# Patient Record
Sex: FEMALE | Race: WHITE | NOT HISPANIC OR LATINO | Employment: OTHER | URBAN - METROPOLITAN AREA
[De-identification: names, ages, dates, MRNs, and addresses within clinical notes are randomized per-mention and may not be internally consistent; named-entity substitution may affect disease eponyms.]

---

## 2017-05-16 ENCOUNTER — ALLSCRIPTS OFFICE VISIT (OUTPATIENT)
Dept: OTHER | Facility: OTHER | Age: 72
End: 2017-05-16

## 2017-05-16 ENCOUNTER — LAB REQUISITION (OUTPATIENT)
Dept: LAB | Facility: HOSPITAL | Age: 72
End: 2017-05-16
Payer: MEDICARE

## 2017-05-16 DIAGNOSIS — M81.0 AGE-RELATED OSTEOPOROSIS WITHOUT CURRENT PATHOLOGICAL FRACTURE: ICD-10-CM

## 2017-05-16 DIAGNOSIS — R30.9 PAINFUL MICTURITION: ICD-10-CM

## 2017-05-16 LAB
BILIRUB UR QL STRIP: NORMAL
CLARITY UR: NORMAL
COLOR UR: YELLOW
GLUCOSE (HISTORICAL): NORMAL
HGB UR QL STRIP.AUTO: NORMAL
KETONES UR STRIP-MCNC: NORMAL MG/DL
LEUKOCYTE ESTERASE UR QL STRIP: NORMAL
NITRITE UR QL STRIP: NORMAL
PH UR STRIP.AUTO: 6.5 [PH]
PROT UR STRIP-MCNC: 30 MG/DL
SP GR UR STRIP.AUTO: 1.02
UROBILINOGEN UR QL STRIP.AUTO: 0.2

## 2017-05-16 PROCEDURE — 87086 URINE CULTURE/COLONY COUNT: CPT | Performed by: PHYSICIAN ASSISTANT

## 2017-05-16 PROCEDURE — 87186 SC STD MICRODIL/AGAR DIL: CPT | Performed by: PHYSICIAN ASSISTANT

## 2017-05-16 PROCEDURE — 87077 CULTURE AEROBIC IDENTIFY: CPT | Performed by: PHYSICIAN ASSISTANT

## 2017-05-19 LAB — BACTERIA UR CULT: NORMAL

## 2017-06-28 ENCOUNTER — GENERIC CONVERSION - ENCOUNTER (OUTPATIENT)
Dept: OTHER | Facility: OTHER | Age: 72
End: 2017-06-28

## 2017-08-28 ENCOUNTER — TRANSCRIBE ORDERS (OUTPATIENT)
Dept: LAB | Facility: CLINIC | Age: 72
End: 2017-08-28

## 2017-08-28 ENCOUNTER — APPOINTMENT (OUTPATIENT)
Dept: LAB | Facility: CLINIC | Age: 72
End: 2017-08-28
Payer: MEDICARE

## 2017-08-28 DIAGNOSIS — I10 ESSENTIAL HYPERTENSION, BENIGN: ICD-10-CM

## 2017-08-28 DIAGNOSIS — M81.0 SENILE OSTEOPOROSIS: ICD-10-CM

## 2017-08-28 DIAGNOSIS — K58.9 IRRITABLE BOWEL SYNDROME, UNSPECIFIED TYPE: ICD-10-CM

## 2017-08-28 DIAGNOSIS — K44.9 HERNIA, HIATAL: ICD-10-CM

## 2017-08-28 DIAGNOSIS — F41.9 ANXIETY HYPERVENTILATION: ICD-10-CM

## 2017-08-28 DIAGNOSIS — F41.9 ANXIETY HYPERVENTILATION: Primary | ICD-10-CM

## 2017-08-28 DIAGNOSIS — F45.8 ANXIETY HYPERVENTILATION: Primary | ICD-10-CM

## 2017-08-28 DIAGNOSIS — F45.8 ANXIETY HYPERVENTILATION: ICD-10-CM

## 2017-08-28 LAB
ALBUMIN SERPL BCP-MCNC: 3.6 G/DL (ref 3.5–5)
ALP SERPL-CCNC: 79 U/L (ref 46–116)
ALT SERPL W P-5'-P-CCNC: 34 U/L (ref 12–78)
ANION GAP SERPL CALCULATED.3IONS-SCNC: 7 MMOL/L (ref 4–13)
AST SERPL W P-5'-P-CCNC: 21 U/L (ref 5–45)
BILIRUB SERPL-MCNC: 0.37 MG/DL (ref 0.2–1)
BUN SERPL-MCNC: 21 MG/DL (ref 5–25)
CALCIUM SERPL-MCNC: 9.1 MG/DL (ref 8.3–10.1)
CHLORIDE SERPL-SCNC: 105 MMOL/L (ref 100–108)
CHOLEST SERPL-MCNC: 199 MG/DL (ref 50–200)
CO2 SERPL-SCNC: 27 MMOL/L (ref 21–32)
CREAT SERPL-MCNC: 0.75 MG/DL (ref 0.6–1.3)
ERYTHROCYTE [DISTWIDTH] IN BLOOD BY AUTOMATED COUNT: 13.9 % (ref 11.6–15.1)
GFR SERPL CREATININE-BSD FRML MDRD: 81 ML/MIN/1.73SQ M
GLUCOSE P FAST SERPL-MCNC: 98 MG/DL (ref 65–99)
HCT VFR BLD AUTO: 41.8 % (ref 34.8–46.1)
HDLC SERPL-MCNC: 65 MG/DL (ref 40–60)
HGB BLD-MCNC: 14.2 G/DL (ref 11.5–15.4)
LDLC SERPL CALC-MCNC: 118 MG/DL (ref 0–100)
MCH RBC QN AUTO: 30 PG (ref 26.8–34.3)
MCHC RBC AUTO-ENTMCNC: 34 G/DL (ref 31.4–37.4)
MCV RBC AUTO: 88 FL (ref 82–98)
PLATELET # BLD AUTO: 278 THOUSANDS/UL (ref 149–390)
PMV BLD AUTO: 9.3 FL (ref 8.9–12.7)
POTASSIUM SERPL-SCNC: 4.5 MMOL/L (ref 3.5–5.3)
PROT SERPL-MCNC: 7.6 G/DL (ref 6.4–8.2)
RBC # BLD AUTO: 4.73 MILLION/UL (ref 3.81–5.12)
SODIUM SERPL-SCNC: 139 MMOL/L (ref 136–145)
TRIGL SERPL-MCNC: 81 MG/DL
TSH SERPL DL<=0.05 MIU/L-ACNC: 2.02 UIU/ML (ref 0.36–3.74)
WBC # BLD AUTO: 6.03 THOUSAND/UL (ref 4.31–10.16)

## 2017-08-28 PROCEDURE — 85027 COMPLETE CBC AUTOMATED: CPT

## 2017-08-28 PROCEDURE — 80061 LIPID PANEL: CPT

## 2017-08-28 PROCEDURE — 36415 COLL VENOUS BLD VENIPUNCTURE: CPT

## 2017-08-28 PROCEDURE — 80053 COMPREHEN METABOLIC PANEL: CPT

## 2017-08-28 PROCEDURE — 84443 ASSAY THYROID STIM HORMONE: CPT

## 2017-09-05 ENCOUNTER — ALLSCRIPTS OFFICE VISIT (OUTPATIENT)
Dept: OTHER | Facility: OTHER | Age: 72
End: 2017-09-05

## 2017-09-05 DIAGNOSIS — M81.0 AGE-RELATED OSTEOPOROSIS WITHOUT CURRENT PATHOLOGICAL FRACTURE: ICD-10-CM

## 2017-10-25 NOTE — PROGRESS NOTES
Assessment  1  Hypertension (401 9) (I10)   2  Osteoporosis (733 00) (M81 0)   3  Anxiety disorder (300 00) (F41 9)   4  GERD without esophagitis (530 81) (K21 9)   5  Family history of osteoporosis (V17 81) (Z82 62) : Sister    Plan  Anxiety disorder, GERD without esophagitis, Hypertension, Osteoporosis    · Follow-up visit in 1 year Evaluation and Treatment  Follow-up  Status: Hold For -  Scheduling  Requested for: 05Sep2017   · (1) CBC/ PLT (NO DIFF); Status:Active; Requested for:17Coi2061;    · (1) COMPREHENSIVE METABOLIC PANEL; Status:Active; Requested for:83Okt9615;    · (1) LIPID PANEL, FASTING; Status:Active; Requested for:05Sep2018;    · (1) TSH; Status:Active; Requested for:05Sep2018;    · (1) VITAMIN D 25-HYDROXY; Status:Active; Requested KCY:[id number]; Health Maintenance    · *VB - Urinary Incontinence Screen (Dx Z13 89 Screen for UI); Status:Complete;   Done:  [id number] 12:11PM  Hypertension    · AmLODIPine Besylate 2 5 MG Oral Tablet; TAKE 1 TABLET DAILY  Malignant melanoma of skin    · ALPRAZolam 0 25 MG Oral Tablet (Xanax); TAKE 1 TABLET DAILY AS NEEDED  Osteoporosis    · * DXA BONE DENSITY SPINE HIP AND PELVIS; Status:Hold For - Scheduling; Requested  HAB:[id number];     Discussion/Summary    #1  Hypertension?stable continue current medication CMP normal  Recheck one year  Osteoporosis? DEXA overdue last 2014  Order given  (Pt  will get DEXA but will never go on meds as both of her two sisters were on meds and had problems with bone wasting needing surgery  )Anxiety?stable  Refills given  Hiatal hernia with GERD? stable  Chief Complaint  Follow up to chronic conditions and review blood work results  Refill of Xanax  History of Present Illness  patient here today to follow-up on chronic conditions including hypertension, osteoporosis anxiety and hiatal hernia with GERD  Blood pressure very control today at 132/68   Blood work shows normal TSH, CBC, CMP and a lipid panel showing an LDL of 118 triglycerides 81  Last DEXA 2014 patient is overdue  She is taking all medications as directed without complaints of side effects  Review of Systems    Constitutional: No fever, no chills, feels well, no tiredness, no recent weight gain or weight loss  Eyes: No complaints of eye pain, no red eyes, no eyesight problems, no discharge, no dry eyes, no itching of eyes  ENT: no complaints of earache, no loss of hearing, no nose bleeds, no nasal discharge, no sore throat, no hoarseness  Cardiovascular: No complaints of slow heart rate, no fast heart rate, no chest pain, no palpitations, no leg claudication, no lower extremity edema  Respiratory: No complaints of shortness of breath, no wheezing, no cough, no SOB on exertion, no orthopnea, no PND  Gastrointestinal: No complaints of abdominal pain, no constipation, no nausea or vomiting, no diarrhea, no bloody stools  Genitourinary: No complaints of dysuria, no incontinence, no pelvic pain, no dysmenorrhea, no vaginal discharge or bleeding  Musculoskeletal: No complaints of arthralgias, no myalgias, no joint swelling or stiffness, no limb pain or swelling  Integumentary: No complaints of skin rash or lesions, no itching, no skin wounds, no breast pain or lump  Neurological: No complaints of headache, no confusion, no convulsions, no numbness, no dizziness or fainting, no tingling, no limb weakness, no difficulty walking  Psychiatric: Not suicidal, no sleep disturbance, no anxiety or depression, no change in personality, no emotional problems  Endocrine: No complaints of proptosis, no hot flashes, no muscle weakness, no deepening of the voice, no feelings of weakness  Hematologic/Lymphatic: No complaints of swollen glands, no swollen glands in the neck, does not bleed easily, does not bruise easily  Preventive Quality 65 and Older: The patient currently has no urinary incontinence symptoms  ROS reviewed  Active Problems  1   Acute gastritis (535 00) (K29 00)   2  Anxiety disorder (300 00) (F41 9)   3  Arthralgia Of Left Shoulder Region (719 41)   4  Asymptomatic postmenopausal status (age-related) (natural) (V49 81) (Z78 0)   5  Cardiac arrhythmia (427 9) (I49 9)   6  Diverticulosis (562 10) (K57 90)   7  GERD without esophagitis (530 81) (K21 9)   8  Hiatal hernia (553 3) (K44 9)   9  Hypercholesterolemia (272 0) (E78 00)   10  Hypertension (401 9) (I10)   11  Irritable bowel syndrome (564 1) (K58 9)   12  Malignant melanoma of skin (172 9) (C43 9)   13  Need for prophylactic vaccination and inoculation against influenza (V04 81) (Z23)   14  Osteoporosis (733 00) (M81 0)   15  Pain in joint of left shoulder (719 41) (M25 512)   16  Painful urination (788 1) (R30 9)   17  Pneumonia (486) (J18 9)   18  Right Deltoid Muscle Strain (840 8)   19  Rotator cuff tendinitis, unspecified laterality (726 10) (M75 80)   20  Rotator Cuff Tendon Tear (726 19)   21  Screening for other and unspecified genitourinary condition (V81 6) (Z13 89)   22  Special screening for other neurological conditions (V80 09) (Z13 89)   23  Splenic artery aneurysm (442 83) (I72 8)   24  Squamous cell carcinoma of skin (173 92) (C44 92)   25  Urinary tract infection (599 0) (N39 0)    Past Medical History  1  History of Encounter for screening colonoscopy (V76 51) (Z12 11)    The active problems and past medical history were reviewed and updated today  Surgical History    The surgical history was reviewed and updated today  Family History  Mother    1  Family history of Cervical Cancer  Father    2  Family history of Prostate Cancer (V16 42)  Sister    3  Family history of osteoporosis (V17 81) (Z82 62)  Family History    4  Family history of Death In The Family Father    The family history was reviewed and updated today         Social History   · Marital History - Currently    · Never a smoker   · Never Drank Alcohol   · No secondhand smoke exposure (V49 89) (Z78 9)   · Retired  The social history was reviewed and updated today  Current Meds   1  ALPRAZolam 0 25 MG Oral Tablet; TAKE 1 TABLET DAILY AS NEEDED; Therapy: 40DGC5761 to (Evaluate:40Exf3546); Last Rx:89Mwd0762 Ordered   2  AmLODIPine Besylate 2 5 MG Oral Tablet; TAKE 1 TABLET DAILY; Therapy: 03EDX6512 to (78 [phone])  Requested for: 06DWG0359; Last   Rx:10Btn3246 Ordered   3  Omeprazole 20 MG Oral Capsule Delayed Release; Therapy: (475 9296) to Recorded   4  Vitamin D 1000 UNIT CAPS; TAKE 2 CAPSULE Daily; Therapy: 80FRG7519 to (Last Rx:62Lux8224) Ordered    The medication list was reviewed and updated today  Allergies  1  Penicillins    Vitals  Vital Signs    Recorded: 05Sep2017 12:05PM   Heart Rate 68   Systolic 080, LUE, Sitting   Diastolic 68, LUE, Sitting   Height 5 ft 4 in   Weight 133 lb 8 oz   BMI Calculated 22 92   BSA Calculated 1 65     Physical Exam    Constitutional   General appearance: No acute distress, well appearing and well nourished  Eyes   Conjunctiva and lids: No swelling, erythema or discharge  Ears, Nose, Mouth, and Throat   External inspection of ears and nose: Normal     Pulmonary   Respiratory effort: No increased work of breathing or signs of respiratory distress  Auscultation of lungs: Clear to auscultation  Cardiovascular   Auscultation of heart: Normal rate and rhythm, normal S1 and S2, without murmurs      Examination of extremities for edema and/or varicosities: Normal     Carotid pulses: Normal     Musculoskeletal   Gait and station: Normal     Psychiatric   Mood and affect: Normal          Results/Data  *VB - Urinary Incontinence Screen (Dx Z13 89 Screen for UI) 29RDA2702 12:11PM Geovanni Mckeon     Test Name Result Flag Reference   Urinary Incontinence Assessment [id number]       Falls Risk Assessment (Dx Z13 89 Screen for Neurologic Disorder) 35GUS8490 12:10PM Abimael s     Test Name Result Flag Reference   Falls Risk      No falls in the past year     PHQ-2 Adult Depression Screening 19Akw3237 12:10PM User, Ahs     Test Name Result Flag Reference   PHQ-2 Adult Depression Score 0     Over the last two weeks, how often have you been bothered by any of the following problems?   Little interest or pleasure in doing things: Not at all - 0  Feeling down, depressed, or hopeless: Not at all - 0   PHQ-2 Adult Depression Screening Negative         Signatures   Electronically signed by : [facility]; Sep  5 2017 12:39PM EST                       (Author)    Electronically signed by : Mingo Gunderson MD; Sep  5 2017 12:43PM EST                       (Author)

## 2018-01-13 VITALS
SYSTOLIC BLOOD PRESSURE: 132 MMHG | HEIGHT: 64 IN | DIASTOLIC BLOOD PRESSURE: 68 MMHG | BODY MASS INDEX: 22.79 KG/M2 | HEART RATE: 68 BPM | WEIGHT: 133.5 LBS

## 2018-01-14 VITALS
BODY MASS INDEX: 22.56 KG/M2 | DIASTOLIC BLOOD PRESSURE: 62 MMHG | HEART RATE: 64 BPM | WEIGHT: 132.13 LBS | SYSTOLIC BLOOD PRESSURE: 134 MMHG | HEIGHT: 64 IN

## 2018-03-29 ENCOUNTER — TELEPHONE (OUTPATIENT)
Dept: FAMILY MEDICINE CLINIC | Facility: CLINIC | Age: 73
End: 2018-03-29

## 2018-03-29 DIAGNOSIS — I10 HYPERTENSION, UNSPECIFIED TYPE: Primary | ICD-10-CM

## 2018-03-29 NOTE — TELEPHONE ENCOUNTER
PATIENT IS ASKING WHY HER AMLODIPINE DOES NOT HAVE ANY REFILLS ON IT - NORMALLY [employer] GIVES HER A 90 DAY SUPPLY WITH 3 REFILLS AND THIS TIME SHE WAS GIVEN ONLY ONE   CAN WE PLEASE REFILL THIS TO [facility] [address] PLEASE  SHE IS NORMALLY SEEN ONCE A YEAR  PLEASE CALL PT ASAP AS SHE ONLY HAS 3 PILLS LEFT  THANK YOU

## 2018-03-30 RX ORDER — AMLODIPINE BESYLATE 2.5 MG/1
1 TABLET ORAL DAILY
COMMUNITY
Start: 2012-11-05 | End: 2018-03-30 | Stop reason: SDUPTHER

## 2018-03-30 RX ORDER — AMLODIPINE BESYLATE 2.5 MG/1
2.5 TABLET ORAL DAILY
Qty: 90 TABLET | Refills: 3 | Status: SHIPPED | OUTPATIENT
Start: 2018-03-30 | End: 2018-09-27 | Stop reason: SDUPTHER

## 2018-09-05 DIAGNOSIS — F41.9 ANXIETY DISORDER: ICD-10-CM

## 2018-09-05 DIAGNOSIS — I10 ESSENTIAL (PRIMARY) HYPERTENSION: ICD-10-CM

## 2018-09-05 DIAGNOSIS — M81.0 AGE-RELATED OSTEOPOROSIS WITHOUT CURRENT PATHOLOGICAL FRACTURE: ICD-10-CM

## 2018-09-05 DIAGNOSIS — K21.9 GASTRO-ESOPHAGEAL REFLUX DISEASE WITHOUT ESOPHAGITIS: ICD-10-CM

## 2018-09-19 ENCOUNTER — APPOINTMENT (OUTPATIENT)
Dept: LAB | Facility: CLINIC | Age: 73
End: 2018-09-19
Payer: MEDICARE

## 2018-09-19 DIAGNOSIS — K21.9 GASTRO-ESOPHAGEAL REFLUX DISEASE WITHOUT ESOPHAGITIS: ICD-10-CM

## 2018-09-19 DIAGNOSIS — M81.0 AGE-RELATED OSTEOPOROSIS WITHOUT CURRENT PATHOLOGICAL FRACTURE: ICD-10-CM

## 2018-09-19 DIAGNOSIS — I10 ESSENTIAL (PRIMARY) HYPERTENSION: ICD-10-CM

## 2018-09-19 DIAGNOSIS — F41.9 ANXIETY DISORDER: ICD-10-CM

## 2018-09-19 LAB
25(OH)D3 SERPL-MCNC: 55.6 NG/ML (ref 30–100)
ALBUMIN SERPL BCP-MCNC: 3.6 G/DL (ref 3.5–5)
ALP SERPL-CCNC: 77 U/L (ref 46–116)
ALT SERPL W P-5'-P-CCNC: 29 U/L (ref 12–78)
ANION GAP SERPL CALCULATED.3IONS-SCNC: 4 MMOL/L (ref 4–13)
AST SERPL W P-5'-P-CCNC: 15 U/L (ref 5–45)
BILIRUB SERPL-MCNC: 0.53 MG/DL (ref 0.2–1)
BUN SERPL-MCNC: 23 MG/DL (ref 5–25)
CALCIUM SERPL-MCNC: 9.2 MG/DL (ref 8.3–10.1)
CHLORIDE SERPL-SCNC: 103 MMOL/L (ref 100–108)
CHOLEST SERPL-MCNC: 181 MG/DL (ref 50–200)
CO2 SERPL-SCNC: 30 MMOL/L (ref 21–32)
CREAT SERPL-MCNC: 0.69 MG/DL (ref 0.6–1.3)
ERYTHROCYTE [DISTWIDTH] IN BLOOD BY AUTOMATED COUNT: 13.4 % (ref 11.6–15.1)
GFR SERPL CREATININE-BSD FRML MDRD: 88 ML/MIN/1.73SQ M
GLUCOSE P FAST SERPL-MCNC: 85 MG/DL (ref 65–99)
HCT VFR BLD AUTO: 42.8 % (ref 34.8–46.1)
HDLC SERPL-MCNC: 57 MG/DL (ref 40–60)
HGB BLD-MCNC: 13.9 G/DL (ref 11.5–15.4)
LDLC SERPL CALC-MCNC: 111 MG/DL (ref 0–100)
MCH RBC QN AUTO: 30.1 PG (ref 26.8–34.3)
MCHC RBC AUTO-ENTMCNC: 32.5 G/DL (ref 31.4–37.4)
MCV RBC AUTO: 93 FL (ref 82–98)
NONHDLC SERPL-MCNC: 124 MG/DL
PLATELET # BLD AUTO: 281 THOUSANDS/UL (ref 149–390)
PMV BLD AUTO: 9.2 FL (ref 8.9–12.7)
POTASSIUM SERPL-SCNC: 4 MMOL/L (ref 3.5–5.3)
PROT SERPL-MCNC: 7.4 G/DL (ref 6.4–8.2)
RBC # BLD AUTO: 4.62 MILLION/UL (ref 3.81–5.12)
SODIUM SERPL-SCNC: 137 MMOL/L (ref 136–145)
TRIGL SERPL-MCNC: 64 MG/DL
TSH SERPL DL<=0.05 MIU/L-ACNC: 3.1 UIU/ML (ref 0.36–3.74)
WBC # BLD AUTO: 6.26 THOUSAND/UL (ref 4.31–10.16)

## 2018-09-19 PROCEDURE — 80061 LIPID PANEL: CPT

## 2018-09-19 PROCEDURE — 80053 COMPREHEN METABOLIC PANEL: CPT

## 2018-09-19 PROCEDURE — 85027 COMPLETE CBC AUTOMATED: CPT

## 2018-09-19 PROCEDURE — 84443 ASSAY THYROID STIM HORMONE: CPT

## 2018-09-19 PROCEDURE — 82306 VITAMIN D 25 HYDROXY: CPT

## 2018-09-19 PROCEDURE — 36415 COLL VENOUS BLD VENIPUNCTURE: CPT

## 2018-09-26 NOTE — ASSESSMENT & PLAN NOTE
Patient is overdue for DEXA, however she does not want to go for it as she is not going to take the medication if needed anyway  Vit D level good at 55

## 2018-09-27 ENCOUNTER — OFFICE VISIT (OUTPATIENT)
Dept: FAMILY MEDICINE CLINIC | Facility: CLINIC | Age: 73
End: 2018-09-27
Payer: MEDICARE

## 2018-09-27 VITALS
DIASTOLIC BLOOD PRESSURE: 72 MMHG | BODY MASS INDEX: 22.8 KG/M2 | HEART RATE: 68 BPM | SYSTOLIC BLOOD PRESSURE: 142 MMHG | WEIGHT: 132.8 LBS

## 2018-09-27 DIAGNOSIS — I10 HYPERTENSION, UNSPECIFIED TYPE: ICD-10-CM

## 2018-09-27 DIAGNOSIS — M81.0 AGE-RELATED OSTEOPOROSIS WITHOUT CURRENT PATHOLOGICAL FRACTURE: ICD-10-CM

## 2018-09-27 DIAGNOSIS — Z23 NEED FOR INFLUENZA VACCINATION: ICD-10-CM

## 2018-09-27 DIAGNOSIS — K21.9 GERD WITHOUT ESOPHAGITIS: ICD-10-CM

## 2018-09-27 DIAGNOSIS — E78.00 HYPERCHOLESTEROLEMIA: Primary | ICD-10-CM

## 2018-09-27 DIAGNOSIS — I10 ESSENTIAL HYPERTENSION: ICD-10-CM

## 2018-09-27 DIAGNOSIS — Z23 NEED FOR PNEUMOCOCCAL VACCINATION: ICD-10-CM

## 2018-09-27 DIAGNOSIS — F41.1 GENERALIZED ANXIETY DISORDER: ICD-10-CM

## 2018-09-27 PROCEDURE — 99214 OFFICE O/P EST MOD 30 MIN: CPT | Performed by: PHYSICIAN ASSISTANT

## 2018-09-27 PROCEDURE — 90670 PCV13 VACCINE IM: CPT

## 2018-09-27 PROCEDURE — G0008 ADMIN INFLUENZA VIRUS VAC: HCPCS

## 2018-09-27 PROCEDURE — G0009 ADMIN PNEUMOCOCCAL VACCINE: HCPCS

## 2018-09-27 PROCEDURE — 90662 IIV NO PRSV INCREASED AG IM: CPT

## 2018-09-27 RX ORDER — AMLODIPINE BESYLATE 2.5 MG/1
2.5 TABLET ORAL DAILY
Qty: 90 TABLET | Refills: 3 | Status: SHIPPED | OUTPATIENT
Start: 2018-09-27 | End: 2019-07-22 | Stop reason: SDUPTHER

## 2018-09-27 RX ORDER — OMEPRAZOLE 20 MG/1
CAPSULE, DELAYED RELEASE ORAL
COMMUNITY
End: 2021-08-03 | Stop reason: ALTCHOICE

## 2018-09-27 RX ORDER — ALPRAZOLAM 0.25 MG/1
1 TABLET ORAL DAILY PRN
COMMUNITY
Start: 2012-11-05 | End: 2022-03-25 | Stop reason: SDUPTHER

## 2018-09-27 RX ORDER — BIOTIN 1 MG
2 TABLET ORAL DAILY
COMMUNITY
Start: 2014-03-12

## 2018-09-27 NOTE — PROGRESS NOTES
Assessment/Plan:    Age-related osteoporosis without current pathological fracture  Patient is overdue for DEXA, however she does not want to go for it as she is not going to take the medication if needed anyway  Vit D level good at 55  Hypercholesterolemia  Stable without medication use with an LDL of 111 triglycerides 64  Essential hypertension  Stable, continue current medications  Diagnoses and all orders for this visit:    Hypercholesterolemia  -     Lipid Panel with Direct LDL reflex; Future  -     Comprehensive metabolic panel; Future  -     CBC and differential; Future    Generalized anxiety disorder    Age-related osteoporosis without current pathological fracture  -     DXA bone density spine hip and pelvis; Future  -     Vitamin D 25 hydroxy; Future    Essential hypertension    GERD without esophagitis    Hypertension, unspecified type  -     amLODIPine (NORVASC) 2 5 mg tablet; Take 1 tablet (2 5 mg total) by mouth daily  -     Comprehensive metabolic panel; Future    Need for influenza vaccination  -     influenza vaccine, 2459-7062, high-dose, PF 0 5 mL, for patients 65 yr+ (FLUZONE HIGH-DOSE)    Other orders  -     Cholecalciferol (VITAMIN D3) 1000 units CAPS; Take 2 capsules by mouth daily  -     omeprazole (PriLOSEC) 20 mg delayed release capsule; Take by mouth  -     ALPRAZolam (XANAX) 0 25 mg tablet; Take 1 tablet by mouth daily as needed          Subjective:   CC: Follow up for chronic conditions and to review blood work  osmel   Patient ID: Jorje Lora is a 72 y o  female  Jorje Lora is here for chronic conditions f/u including the diagnosis of Hypercholesterolemia  (primary encounter diagnosis)  Generalized anxiety disorder  Age-related osteoporosis without current pathological fracture  Essential hypertension  Gerd without esophagitis  Hypertension, unspecified type  Need for influenza vaccination    Pt  states they are taking all medications as directed without complaints or side effects   Pt  had labs done prior to today's visit which included Recent Results (from the past 672 hour(s))  -Vitamin D 25 hydroxy  Collection Time: 09/19/18  7:07 AM       Result                      Value             Ref Range           Vit D, 25-Hydroxy           55 6              30 0 - 100 0*  -Comprehensive metabolic panel  Collection Time: 09/19/18  7:07 AM       Result                      Value             Ref Range           Sodium                      137               136 - 145 mm*       Potassium                   4 0               3 5 - 5 3 mm*       Chloride                    103               100 - 108 mm*       CO2                         30                21 - 32 mmol*       ANION GAP                   4                 4 - 13 mmol/L       BUN                         23                5 - 25 mg/dL        Creatinine                  0 69              0 60 - 1 30 *       Glucose, Fasting            85                65 - 99 mg/dL       Calcium                     9 2               8 3 - 10 1 m*       AST                         15                5 - 45 U/L          ALT                         29                12 - 78 U/L         Alkaline Phosphatase        77                46 - 116 U/L        Total Protein               7 4               6 4 - 8 2 g/*       Albumin                     3 6               3 5 - 5 0 g/*       Total Bilirubin             0 53              0 20 - 1 00 *       eGFR                        88                ml/min/1 73s*  -Lipid panel  Collection Time: 09/19/18  7:07 AM       Result                      Value             Ref Range           Cholesterol                 181               50 - 200 mg/*       Triglycerides               64                <=150 mg/dL         HDL, Direct                 57                40 - 60 mg/dL       LDL Calculated              111 (H)           0 - 100 mg/dL       Non-HDL-Chol (CHOL-HDL)     124               mg/dl -TSH, 3rd generation  Collection Time: 09/19/18  7:07 AM       Result                      Value             Ref Range           TSH 3RD GENERATON           3 100             0 358 - 3 74*  -CBC  Collection Time: 09/19/18  7:07 AM       Result                      Value             Ref Range           WBC                         6 26              4 31 - 10 16*       RBC                         4 62              3 81 - 5 12 *       Hemoglobin                  13 9              11 5 - 15 4 *       Hematocrit                  42 8              34 8 - 46 1 %       MCV                         93                82 - 98 fL          MCH                         30 1              26 8 - 34 3 *       MCHC                        32 5              31 4 - 37 4 *       RDW                         13 4              11 6 - 15 1 %       Platelets                   281               149 - 390 Th*       MPV                         9 2               8 9 - 12 7 fL    Pt has been going to the gym once weekly  Yesterday BP at home was 114/62 in the am  It is always high in the office  The following portions of the patient's history were reviewed and updated as appropriate: allergies, current medications, past family history, past medical history, past social history, past surgical history and problem list     Review of Systems   Constitutional: Negative  HENT: Negative  Eyes: Negative  Respiratory: Negative  Cardiovascular: Negative  Gastrointestinal: Negative  Endocrine: Negative  Genitourinary: Negative  Musculoskeletal: Negative  Skin: Negative  Allergic/Immunologic: Negative  Neurological: Negative  Hematological: Negative  Psychiatric/Behavioral: Negative            Objective:      Vitals:    09/27/18 0900   BP: 142/72   BP Location: Left arm   Patient Position: Sitting   Pulse: 68   Weight: 60 2 kg (132 lb 12 8 oz)            Physical Exam   Constitutional: She is oriented to person, place, and time  She appears well-developed and well-nourished  No distress  HENT:   Head: Normocephalic and atraumatic  Eyes: Conjunctivae are normal  Right eye exhibits no discharge  Left eye exhibits no discharge  Neck: Neck supple  Carotid bruit is not present  Cardiovascular: Normal rate, regular rhythm and normal heart sounds  Exam reveals no gallop and no friction rub  No murmur heard  Pulmonary/Chest: Effort normal and breath sounds normal  No respiratory distress  She has no wheezes  She has no rales  Neurological: She is alert and oriented to person, place, and time  Skin: Skin is warm and dry  She is not diaphoretic  Psychiatric: She has a normal mood and affect  Judgment normal    Nursing note and vitals reviewed

## 2018-09-27 NOTE — PATIENT INSTRUCTIONS
Problem List Items Addressed This Visit        Digestive    GERD without esophagitis    Relevant Medications    omeprazole (PriLOSEC) 20 mg delayed release capsule       Cardiovascular and Mediastinum    Essential hypertension     Stable, continue current medications  Relevant Medications    amLODIPine (NORVASC) 2 5 mg tablet       Musculoskeletal and Integument    Age-related osteoporosis without current pathological fracture     Patient is overdue for DEXA, however she does not want to go for it as she is not going to take the medication if needed anyway  Vit D level good at 55  Relevant Orders    DXA bone density spine hip and pelvis    Vitamin D 25 hydroxy       Other    Generalized anxiety disorder    Relevant Medications    ALPRAZolam (XANAX) 0 25 mg tablet    Hypercholesterolemia - Primary     Stable without medication use with an LDL of 111 triglycerides 64           Relevant Orders    Lipid Panel with Direct LDL reflex    Comprehensive metabolic panel    CBC and differential      Other Visit Diagnoses     Hypertension, unspecified type        Relevant Medications    amLODIPine (NORVASC) 2 5 mg tablet    Other Relevant Orders    Comprehensive metabolic panel    Need for influenza vaccination        Relevant Orders    influenza vaccine, [id number], high-dose, PF 0 5 mL, for patients 65 yr+ (FLUZONE HIGH-DOSE)

## 2018-12-03 ENCOUNTER — TELEPHONE (OUTPATIENT)
Dept: FAMILY MEDICINE CLINIC | Facility: CLINIC | Age: 73
End: 2018-12-03

## 2018-12-03 NOTE — TELEPHONE ENCOUNTER
I do not believe the recall is for norvasc alone but for the meds which are combo with norvasc in it  Pt should call her pharmacy and they should have more information

## 2018-12-03 NOTE — TELEPHONE ENCOUNTER
PT IS QUESTIONING WHAT ELSE SHE CAN TAKE IN PLACE OF AMLODIPINE BESYLATE AS SHE HEARD ON THE NEWS THAT SHE SHOULD STOP TAKING IT  PLEASE ADVISE  THANK YOU

## 2019-03-24 DIAGNOSIS — I10 HYPERTENSION, UNSPECIFIED TYPE: ICD-10-CM

## 2019-03-25 RX ORDER — AMLODIPINE BESYLATE 2.5 MG/1
TABLET ORAL
Qty: 90 TABLET | Refills: 3 | Status: SHIPPED | OUTPATIENT
Start: 2019-03-25 | End: 2019-12-16 | Stop reason: SDUPTHER

## 2019-07-15 ENCOUNTER — TRANSCRIBE ORDERS (OUTPATIENT)
Dept: LAB | Facility: CLINIC | Age: 74
End: 2019-07-15

## 2019-07-15 ENCOUNTER — APPOINTMENT (OUTPATIENT)
Dept: LAB | Facility: CLINIC | Age: 74
End: 2019-07-15
Payer: MEDICARE

## 2019-07-15 DIAGNOSIS — K21.9 GASTROESOPHAGEAL REFLUX DISEASE, ESOPHAGITIS PRESENCE NOT SPECIFIED: Primary | ICD-10-CM

## 2019-07-15 DIAGNOSIS — K58.9 IRRITABLE BOWEL SYNDROME, UNSPECIFIED TYPE: ICD-10-CM

## 2019-07-15 DIAGNOSIS — Z12.11 SPECIAL SCREENING FOR MALIGNANT NEOPLASMS, COLON: ICD-10-CM

## 2019-07-15 LAB — HEMOCCULT STL QL IA: NEGATIVE

## 2019-07-15 PROCEDURE — G0328 FECAL BLOOD SCRN IMMUNOASSAY: HCPCS

## 2019-07-22 ENCOUNTER — OFFICE VISIT (OUTPATIENT)
Dept: FAMILY MEDICINE CLINIC | Facility: CLINIC | Age: 74
End: 2019-07-22
Payer: MEDICARE

## 2019-07-22 VITALS
RESPIRATION RATE: 16 BRPM | HEART RATE: 72 BPM | BODY MASS INDEX: 22.88 KG/M2 | DIASTOLIC BLOOD PRESSURE: 74 MMHG | HEIGHT: 64 IN | WEIGHT: 134 LBS | SYSTOLIC BLOOD PRESSURE: 138 MMHG

## 2019-07-22 DIAGNOSIS — N30.01 ACUTE CYSTITIS WITH HEMATURIA: Primary | ICD-10-CM

## 2019-07-22 DIAGNOSIS — I10 ESSENTIAL HYPERTENSION: ICD-10-CM

## 2019-07-22 DIAGNOSIS — Z78.0 POST-MENOPAUSE: ICD-10-CM

## 2019-07-22 DIAGNOSIS — C43.9 MALIGNANT MELANOMA OF SKIN (HCC): ICD-10-CM

## 2019-07-22 DIAGNOSIS — K21.9 GERD WITHOUT ESOPHAGITIS: ICD-10-CM

## 2019-07-22 DIAGNOSIS — E78.00 HYPERCHOLESTEROLEMIA: ICD-10-CM

## 2019-07-22 DIAGNOSIS — Z00.00 HEALTHCARE MAINTENANCE: ICD-10-CM

## 2019-07-22 DIAGNOSIS — I72.8 SPLENIC ARTERY ANEURYSM (HCC): ICD-10-CM

## 2019-07-22 DIAGNOSIS — R31.9 HEMATURIA, UNSPECIFIED TYPE: ICD-10-CM

## 2019-07-22 LAB
SL AMB  POCT GLUCOSE, UA: ABNORMAL
SL AMB LEUKOCYTE ESTERASE,UA: ABNORMAL
SL AMB POCT BILIRUBIN,UA: ABNORMAL
SL AMB POCT BLOOD,UA: ABNORMAL
SL AMB POCT CLARITY,UA: ABNORMAL
SL AMB POCT COLOR,UA: YELLOW
SL AMB POCT KETONES,UA: ABNORMAL
SL AMB POCT NITRITE,UA: ABNORMAL
SL AMB POCT PH,UA: 7
SL AMB POCT SPECIFIC GRAVITY,UA: 1.01
SL AMB POCT URINE PROTEIN: ABNORMAL
SL AMB POCT UROBILINOGEN: 0.2

## 2019-07-22 PROCEDURE — 87077 CULTURE AEROBIC IDENTIFY: CPT | Performed by: FAMILY MEDICINE

## 2019-07-22 PROCEDURE — 87086 URINE CULTURE/COLONY COUNT: CPT | Performed by: FAMILY MEDICINE

## 2019-07-22 PROCEDURE — 87186 SC STD MICRODIL/AGAR DIL: CPT | Performed by: FAMILY MEDICINE

## 2019-07-22 PROCEDURE — G0439 PPPS, SUBSEQ VISIT: HCPCS | Performed by: FAMILY MEDICINE

## 2019-07-22 PROCEDURE — 99214 OFFICE O/P EST MOD 30 MIN: CPT | Performed by: FAMILY MEDICINE

## 2019-07-22 PROCEDURE — 81002 URINALYSIS NONAUTO W/O SCOPE: CPT | Performed by: FAMILY MEDICINE

## 2019-07-22 RX ORDER — NITROFURANTOIN 25; 75 MG/1; MG/1
100 CAPSULE ORAL 2 TIMES DAILY
Qty: 14 CAPSULE | Refills: 0 | Status: SHIPPED | OUTPATIENT
Start: 2019-07-22 | End: 2019-07-29

## 2019-07-22 NOTE — PROGRESS NOTES
Assessment and Plan:     Problem List Items Addressed This Visit     None      Visit Diagnoses     Urine frequency    -  Primary    Relevant Orders    POCT urine dip (Completed)    Urine culture         History of Present Illness:     Patient presents for Medicare Annual Wellness visit    Patient Care Team:  Marc Cohen PA-C as PCP - General (Family Medicine)  CLEMENT Herzog PA-C Terrance Rowan, PA-C     Problem List:     Patient Active Problem List   Diagnosis    Generalized anxiety disorder    Cardiac arrhythmia    Diverticulosis    GERD without esophagitis    Hiatal hernia    Hypercholesterolemia    Essential hypertension    Irritable bowel syndrome    Malignant melanoma of skin ([address] )    Age-related osteoporosis without current pathological fracture    Squamous cell carcinoma of skin    Splenic artery aneurysm [facility])      Past Medical and Surgical History:     History reviewed  No pertinent past medical history  History reviewed  No pertinent surgical history     Family History:     Family History   Problem Relation Age of Onset    Cervical cancer Mother     Prostate cancer Father     Osteoporosis Sister       Social History:     Social History     Tobacco Use   Smoking Status Never Smoker   Smokeless Tobacco Never Used   Tobacco Comment    No secondhand smoke exposure      Social History     Substance and Sexual Activity   Alcohol Use No     Social History     Substance and Sexual Activity   Drug Use Not on file      Medications and Allergies:     Current Outpatient Medications   Medication Sig Dispense Refill    ALPRAZolam (XANAX) 0 25 mg tablet Take 1 tablet by mouth daily as needed      amLODIPine (NORVASC) 2 5 mg tablet Take 1 tablet (2 5 mg total) by mouth daily 90 tablet 3    amLODIPine (NORVASC) 2 5 mg tablet TAKE ONE TABLET BY MOUTH ONCE DAILY 90 tablet 3    Cholecalciferol (VITAMIN D3) 1000 units CAPS Take 2 capsules by mouth daily      omeprazole (PriLOSEC) 20 mg delayed release capsule Take by mouth       No current facility-administered medications for this visit  Allergies   Allergen Reactions    Penicillins       Immunizations:     Immunization History   Administered Date(s) Administered    Influenza Split High Dose Preservative Free IM 10/06/2014, 10/01/2015, 10/05/2016, 10/12/2017    Influenza TIV (IM) 1945, 11/05/2012, 10/31/2013    Influenza, high dose seasonal 0 5 mL 09/27/2018    Pneumococcal Conjugate 13-Valent 09/27/2018    Zoster 11/23/2014      Medicare Screening Tests and Risk Assessments:     Walt Gama is here for her Subsequent Wellness visit  Health Risk Assessment:  Patient rates overall health as very good  Patient feels that their physical health rating is Same  Eyesight was rated as Same  Hearing was rated as Same  Patient feels that their emotional and mental health rating is Same  Pain experienced by patient in the last 7 days has been None  Patient's pain rating has been 1/10  Patient states that she has experienced no weight loss or gain in last 6 months  Emotional/Mental Health:    PHQ-9 Depression Screening:    Frequency of the following problems over the past two weeks:      1  Little interest or pleasure in doing things: 0 - not at all      2  Feeling down, depressed, or hopeless: 0 - not at all  PHQ-2 Score: 0          Broken Bones/Falls: Fall Risk Assessment:    In the past year, patient has experienced: No history of falling in past year          Bladder/Bowel:  Patient has not leaked urine accidently in the last six months  Patient reports no loss of bowel control  Immunizations:  Patient has had a flu vaccination within the last year  Patient has received a pneumonia shot  Patient has not received a shingles shot  Patient has received tetanus/diphtheria shot  Home Safety:  Patient does not have trouble with stairs inside or outside of their home     Patient currently reports that there are safety hazards present in home , working smoke alarms, working carbon monoxide detectors  Preventative Screenings:   Breast cancer screening performed, colon cancer screen completed, cholesterol screen completed, glaucoma eye exam completed,     Nutrition:  Current diet: Regular, Limited junk food and Low Carb with servings of the following:    Medications:  Patient is currently taking over-the-counter supplements  Patient is able to manage medications  Lifestyle Choices:  Patient reports no tobacco use  Patient has not smoked or used tobacco in the past   Patient reports no alcohol use  Patient drives a vehicle  Patient wears seat belt  Activities of Daily Living:  Can get out of bed by his or her self, able to dress self, able to make own meals, able to do own shopping, able to bathe self, can do own laundry/housekeeping, can manage own money, pay bills and track expenses    Previous Hospitalizations:  No hospitalization or ED visit in past 12 months        Advanced Directives:  Patient has completed advanced directive          Preventative Screening/Counseling:      Cardiovascular:      General: Screening Not Indicated          Diabetes:      General: Screening Current and Risks and Benefits Discussed          Colorectal Cancer:      General: Screening Current and Risks and Benefits Discussed          Breast Cancer:      General: Risks and Benefits Discussed and Screening Current          Cervical Cancer:      General: Risks and Benefits Discussed and Screening Current      Comments: Hysterectomy        Osteoporosis:      General: Risks and Benefits Discussed and Patient Declines      Counseling: Calcium and Vitamin D Intake and Regular Weightbearing Exercise          AAA:      General: Screening Not Indicated          Glaucoma:      General: Risks and Benefits Discussed and Screening Current      Comments: Sees eye care professional         HIV:      General: Patient Declines Hepatitis C:      General: Patient Declines        Advanced Directives:   Patient has living will for healthcare, has durable POA for healthcare, patient has an advanced directive  Information on ACP and/or AD provided  No 5 wishes given

## 2019-07-22 NOTE — PATIENT INSTRUCTIONS
Problem List Items Addressed This Visit     Essential hypertension     Blood pressure today reasonable  No changes  Continue current treatment, Norvasc  GERD without esophagitis     Stable with PPI  Follow with GI as needed  Hypercholesterolemia     Patient is currently off medications for cholesterol  No changes  Check in 6 months or as scheduled  Malignant melanoma of skin (HCC)     Stable at the moment  Following with Dermatology  Splenic artery aneurysm [facility])     Patient did have a CT scan at [address] [place] [address] [address] in 2013 which showed a 9 mm splenic artery aneurysm with calcifications  She reports she was told that she did not have to do anything about it back then, and has not had further follow-up  Reviewed that she could go to vascular, or have a repeat study  The reason behind this would be to see if this has gotten any worse, and see if any treatment would be appropriate  Patient understands this, and chooses to not have further evaluation at this time  She will follow in the future if needed  Other Visit Diagnoses     Acute cystitis with hematuria    -  Primary    Appears to be UTI  I would recommend Macrobid  Follow in 2 weeks if needed  If increased symptoms, queston stone  Check US then  Relevant Medications    nitrofurantoin (MACROBID) 100 mg capsule    Other Relevant Orders    POCT urine dip (Completed)    Urine culture    Hematuria, unspecified type        Hematuria noted  If antibiotics resolve symptoms, no further evaluation    If not, consider repeat urinalysis and kidney and bladder ultrasound to rule out ston    Healthcare maintenance        Post-menopause        Relevant Orders    DXA bone density spine hip and pelvis

## 2019-07-22 NOTE — ASSESSMENT & PLAN NOTE
Patient did have a CT scan at [facility] in 2013 which showed a 9 mm splenic artery aneurysm with calcifications  She reports she was told that she did not have to do anything about it back then, and has not had further follow-up  Reviewed that she could go to vascular, or have a repeat study  The reason behind this would be to see if this has gotten any worse, and see if any treatment would be appropriate  Patient understands this, and chooses to not have further evaluation at this time  She will follow in the future if needed

## 2019-07-22 NOTE — PROGRESS NOTES
Assessment and Plan:    Problem List Items Addressed This Visit     Essential hypertension     Blood pressure today reasonable  No changes  Continue current treatment, Norvasc  GERD without esophagitis     Stable with PPI  Follow with GI as needed  Hypercholesterolemia     Patient is currently off medications for cholesterol  No changes  Check in 6 months or as scheduled  Malignant melanoma of skin (HCC)     Stable at the moment  Following with Dermatology  Splenic artery aneurysm [facility])     Patient did have a CT scan at [address] [place] [address] [address] in 2013 which showed a 9 mm splenic artery aneurysm with calcifications  She reports she was told that she did not have to do anything about it back then, and has not had further follow-up  Reviewed that she could go to vascular, or have a repeat study  The reason behind this would be to see if this has gotten any worse, and see if any treatment would be appropriate  Patient understands this, and chooses to not have further evaluation at this time  She will follow in the future if needed  Other Visit Diagnoses     Acute cystitis with hematuria    -  Primary    Appears to be UTI  I would recommend Macrobid  Follow in 2 weeks if needed  If increased symptoms, queston stone  Check US then  Relevant Medications    nitrofurantoin (MACROBID) 100 mg capsule    Other Relevant Orders    POCT urine dip (Completed)    Urine culture    Hematuria, unspecified type        Hematuria noted  If antibiotics resolve symptoms, no further evaluation  If not, consider repeat urinalysis and kidney and bladder ultrasound to rule out ston    Healthcare maintenance        Post-menopause        Relevant Orders    DXA bone density spine hip and pelvis                 Diagnoses and all orders for this visit:    Acute cystitis with hematuria  Comments:  Appears to be UTI  I would recommend Macrobid  Follow in 2 weeks if needed  If increased symptoms, queston stone  Check US then  Orders:  -     POCT urine dip  -     Urine culture; Future  -     Urine culture  -     nitrofurantoin (MACROBID) 100 mg capsule; Take 1 capsule (100 mg total) by mouth 2 (two) times a day for 7 days    Hematuria, unspecified type  Comments:  Hematuria noted  If antibiotics resolve symptoms, no further evaluation  If not, consider repeat urinalysis and kidney and bladder ultrasound to rule out ston    Essential hypertension    GERD without esophagitis    Hypercholesterolemia    Healthcare maintenance    Post-menopause  -     DXA bone density spine hip and pelvis; Future    Splenic artery aneurysm (HCC)    Malignant melanoma of skin (HCC)              Subjective:      Patient ID: Lorna Schaeffer is a 69 y o  female  CC:    Chief Complaint   Patient presents with    Urinary Frequency     pt here with urine frequency since yesterday    Diarrhea     since last night  LENORA Cason       HPI:    Patient is here because she feels she may have a UTI  Patient was having frequency, as well as some urgency  Every time she would start urinating, would burn and hurt  This is continued  Last night was quite significant  Start on Sunday morning with some frequency, but then progressed as the day went on  She was also having some loose stools at the same time overnight  Does not notice any blood in the urine  Urine did have an odd smell  She had chills yesterday  She did check her temperature, but noted that she did not have a fever  The she did take Aleve yesterday, and that helped a little bit  Today, and over night, and did not make any difference  She also did try Tylenol this morning  Patient does have a history of hypertension  She has been on medications  Has no particular concerns with that at the moment  Patient does occasionally have orthostasis, but rare  Cholesterol: She is not on meds  Stable  Has seen this office for this before  GERD: Has seen GI   Has PPI, and using as needed  I did review DEXA scan with her, as well as the diagnosis of osteoporosis  She is hesitant to have the DEXA scan done as her sisters have both had medications for osteoporosis and both have had side effects  The following portions of the patient's history were reviewed and updated as appropriate: allergies, current medications, past family history, past medical history, past social history, past surgical history and problem list       Review of Systems   Constitutional: Negative  HENT: Negative  Respiratory: Negative  Cardiovascular: Negative  Gastrointestinal:        Loose stools   Genitourinary:        Per HPI  All other systems reviewed and are negative  Data to review:       Objective:    Vitals:    07/22/19 1038   BP: 138/74   BP Location: Left arm   Patient Position: Sitting   Cuff Size: Standard   Pulse: 72   Resp: 16   Weight: 60 8 kg (134 lb)   Height: 5' 4" (1 626 m)        Physical Exam   Constitutional: She appears well-developed and well-nourished  HENT:   Head: Normocephalic and atraumatic  Cardiovascular: Normal rate, regular rhythm and normal heart sounds  Pulses:       Carotid pulses are 2+ on the right side, and 2+ on the left side  Pulmonary/Chest: Effort normal and breath sounds normal  She has no wheezes  She has no rales  She exhibits no tenderness  Abdominal: Soft  Bowel sounds are normal  She exhibits no distension and no mass  There is no tenderness  There is no rebound and no guarding  Nursing note and vitals reviewed

## 2019-07-22 NOTE — ASSESSMENT & PLAN NOTE
Patient is currently off medications for cholesterol  No changes  Check in 6 months or as scheduled

## 2019-07-24 LAB — BACTERIA UR CULT: ABNORMAL

## 2019-09-23 ENCOUNTER — APPOINTMENT (OUTPATIENT)
Dept: RADIOLOGY | Facility: CLINIC | Age: 74
End: 2019-09-23
Payer: MEDICARE

## 2019-09-23 ENCOUNTER — OFFICE VISIT (OUTPATIENT)
Dept: URGENT CARE | Facility: CLINIC | Age: 74
End: 2019-09-23
Payer: MEDICARE

## 2019-09-23 VITALS
RESPIRATION RATE: 16 BRPM | WEIGHT: 133 LBS | OXYGEN SATURATION: 99 % | TEMPERATURE: 99.4 F | HEIGHT: 64 IN | BODY MASS INDEX: 22.71 KG/M2 | SYSTOLIC BLOOD PRESSURE: 172 MMHG | DIASTOLIC BLOOD PRESSURE: 76 MMHG

## 2019-09-23 DIAGNOSIS — M25.572 ACUTE LEFT ANKLE PAIN: ICD-10-CM

## 2019-09-23 DIAGNOSIS — M25.572 ACUTE LEFT ANKLE PAIN: Primary | ICD-10-CM

## 2019-09-23 PROCEDURE — 99213 OFFICE O/P EST LOW 20 MIN: CPT | Performed by: NURSE PRACTITIONER

## 2019-09-23 PROCEDURE — G0463 HOSPITAL OUTPT CLINIC VISIT: HCPCS | Performed by: NURSE PRACTITIONER

## 2019-09-23 PROCEDURE — 73610 X-RAY EXAM OF ANKLE: CPT

## 2019-09-23 NOTE — PROGRESS NOTES
[address]        NAME: Lyly June is a 69 y o  female  : 1945    MRN: [id number]  DATE: 2019  TIME: 3:46 PM    Assessment and Plan   Acute left ankle pain [M25 572]  1  Acute left ankle pain  XR ankle 3+ vw left     Xray done in office - results negative for fracture  Large amount of swelling present  Diagnosis of left ankle sprain  Air cast applied  Exercises given  If no improvement rec f/u with pcp and pt   Pt in agreement with plan     Patient Instructions     Follow up with PCP in 3-5 days  Proceed to  ER if symptoms worsen  Chief Complaint     Chief Complaint   Patient presents with   [employer] Fall     pt states missed the last stair on steps and landed on left ankle         History of Present Illness   Lyly June presents to the clinic c/o    Pt presents to the office after a fall injury occurring approx 1 hour ago in her home  Was carrying her vacuum  down the steps  Thought she was at the bottom but had another step to go  Went down and rolled her left ankle  Pain started immediately  Did put ice on it  Hurts to walk on it or to go up or down the steps  Review of Systems   Review of Systems   All other systems reviewed and are negative          Current Medications     Long-Term Medications   Medication Sig Dispense Refill    ALPRAZolam (XANAX) 0 25 mg tablet Take 1 tablet by mouth daily as needed      amLODIPine (NORVASC) 2 5 mg tablet TAKE ONE TABLET BY MOUTH ONCE DAILY 90 tablet 3    Cholecalciferol (VITAMIN D3) 1000 units CAPS Take 2 capsules by mouth daily      omeprazole (PriLOSEC) 20 mg delayed release capsule Take by mouth         Current Allergies     Allergies as of 2019 - Reviewed 2019   Allergen Reaction Noted    Penicillins  10/03/2012            The following portions of the patient's history were reviewed and updated as appropriate: allergies, current medications, past family history, past medical history, past social history, past surgical history and problem list     Objective   BP (!) 172/76   Temp 99 4 °F (37 4 °C) (Tympanic)   Resp 16   Ht 5' 4" (1 626 m)   Wt 60 3 kg (133 lb)   SpO2 99%   BMI 22 83 kg/m²        Physical Exam     Physical Exam   Constitutional: She is oriented to person, place, and time  Vital signs are normal  She appears well-developed and well-nourished  She is active and cooperative  HENT:   Head: Normocephalic and atraumatic  Eyes: Conjunctivae and lids are normal    Cardiovascular: Normal rate, regular rhythm, S1 normal, S2 normal and normal heart sounds  Pulmonary/Chest: Effort normal and breath sounds normal    Musculoskeletal:        Left ankle: She exhibits decreased range of motion and swelling  She exhibits no ecchymosis, no deformity, no laceration and normal pulse  Tenderness  Lateral malleolus and AITFL tenderness found  No medial malleolus, no CF ligament, no posterior TFL, no head of 5th metatarsal and no proximal fibula tenderness found  Achilles tendon exhibits no pain, no defect and normal Johnson's test results  Neurological: She is alert and oriented to person, place, and time  Skin: Skin is warm and dry  Psychiatric: She has a normal mood and affect  Her speech is normal and behavior is normal  Judgment and thought content normal  Cognition and memory are normal    Nursing note and vitals reviewed              Splint application  Date/Time: 9/23/2019 7:18 PM  Performed by: JOSE EDUARDO Sanders  Authorized by: JOSE EDUARDO Sanders     Consent:     Consent obtained:  Verbal    Consent given by:  Patient    Risks discussed:  Discoloration, numbness, pain and swelling    Alternatives discussed:  No treatment, delayed treatment, alternative treatment, observation and referral  Pre-procedure details:     Sensation:  Normal  Procedure details:     Laterality:  Left    Location:  Ankle    Ankle:  L ankle    Strapping: no      Splint type: air cast         Air cast applied by myself

## 2019-09-23 NOTE — PATIENT INSTRUCTIONS
Ankle Exercises   AMBULATORY CARE:   What you need to know about ankle exercises: Ankle exercises help strengthen your ankle and improve its function after injury  These are beginning exercises  Ask your healthcare provider if you need to see a physical therapist for more advanced exercises  · Do these exercises 3 to 5 days a week , or as directed by your healthcare provider  Ask if you should perform the exercises on each ankle  · Do the exercises in the order that your healthcare provider recommends  This will help prevent swelling, chronic pain, and reinjury  Start with range of motion exercises  Then progress to strengthening exercises, and finally to balancing exercises  · Warm up before you do ankle exercises  Walk or ride a stationary bike for 5 to 10 minutes to prepare your ankle for movement  · Stop if you feel pain  It is normal to feel some discomfort at first  Regular exercise will help decrease your discomfort over time  How to perform range of motion exercises safely:  Begin with range of motion exercises to improve flexibility  Ask your healthcare provider when you can progress to strengthening exercises  · Ankle alphabet:  Sit on a chair so that your feet do not touch the floor  Use your big toe to write each letter of the alphabet  Use only your foot and ankle, and keep your movements small  Do 2 sets  · Calf stretches:      ¨ Sitting calf stretches with a towel:  Sit on the floor with both legs out straight in front of you  Loop a towel around the ball of your injured foot  Grasp the ends of the towel and pull it toward you  Keep your leg and back straight  Do not lean forward as you pull the towel  Hold for 30 seconds  Then relax for 30 seconds  Do 2 sets of 10  ¨ Standing calf stretches:  Stand facing a wall with the foot that is not injured forward and your knee slightly bent   Keep the leg with the injured foot straight and behind you with your toes pointed in slightly  With both heels flat on the floor, press your hips forward  Do not arch your back  Hold for 30 seconds, and then relax for 30 seconds  Do 2 sets of 10  Repeat with your leg bent  Do 2 sets of 10  How to perform strengthening exercises safely:  After you can perform range of motion exercises without pain, you may begin strengthening exercises  Ask your healthcare provider when you can progress to balancing exercises  · Ankle movement in 4 directions:  Sit on the floor with your legs straight in front of you  Keep your heels on the floor for support  ¨ Dorsiflexion:  Begin with your toes pointing straight up  Pull your toes toward your body  Slowly return to the starting position  Do 3 sets of 5      ¨ Plantar flexion:  Begin with your toes pointing straight up  Push your toes away from your body  Slowly return to the starting position  Do 3 sets of 5            ¨ Inversion:  Begin with your toes pointing straight up  Push your toes inward, toward each other  Slowly return to the starting position  Do 3 sets of 5      ¨ Eversion:  Begin with your toes pointing straight up  Push your toes outward, away from each other  Slowly return to the starting position  Do 3 sets of 5          · Toe curls with a towel:  Sit on a chair so that both of your feet are flat on the floor  Place a small towel on the floor in front of your injured foot  Grab the center of the towel with your toes and curl the towel toward you  Relax and repeat  Do 1 set of 5            · Desha pick-ups:  Sit on a chair so that both of your feet are flat on the floor  Place 20 marbles on the floor in front of your injured foot  Use your toes to  one marble at a time and place it into a bowl  Repeat until you have picked up all the marbles  Do 1 set  · Heel raises:      ¨ Single leg heel raises:  Stand with your weight evenly on both feet  Hold on to a chair or a wall for balance   Lift the foot that is not injured off the floor so all your weight is placed on your injured foot  Raise the heel of your injured foot as high as you can  Slowly lower your heel to the floor  Do 1 set of 10  ¨ Double leg heel raises:  Stand with your weight evenly on both feet  Hold on to a chair or a wall for balance  Raise both of your heels as high as you can  Slowly lower your heels to the floor  Do 1 set of 10  · Heel and toe walks:      ¨ Heel walks:  Begin in a standing position  Lift your toes off the floor and walk on your heels  Keep your toes lifted as high as possible  Do 2 sets of 10  ¨ Toe walks:  Begin in a standing position  Lift your heels off the floor and walk on the balls and toes of your feet  Keep your heels lifted as high as possible  Do 2 sets of 10  How to perform a balance exercise safely:  After you can perform strengthening exercises without pain, you may do this beginning balancing exercise  Ask your healthcare provider for more advanced balance exercises  · Single leg stance:  Stand with your weight evenly on both feet, or hold on to a chair or a wall  Do not lean to the side  Lift the foot that is not injured off the floor so all your weight is placed on your injured foot  Balance on your injured foot  Ask your healthcare provider how long to hold this position  Contact your healthcare provider if:   · Your pain becomes worse  · You have new pain  · You have questions or concerns about your condition, care, or exercise program   © 2017 [address] Information is for End User's use only and may not be sold, redistributed or otherwise used for commercial purposes  All illustrations and images included in CareNotes® are the copyrighted property of [employer]  or Melvin Martinez  The above information is an  only  It is not intended as medical advice for individual conditions or treatments   Talk to your doctor, nurse or pharmacist before following any medical regimen to see if it is safe and effective for you  Ankle Sprain   WHAT YOU NEED TO KNOW:   An ankle sprain happens when 1 or more ligaments in your ankle joint stretch or tear  Ligaments are tough tissues that connect bones  Ligaments support your joints and keep your bones in place  DISCHARGE INSTRUCTIONS:   Return to the emergency department if:   · You have severe pain in your ankle  · Your foot or toes are cold or numb  · Your ankle becomes more weak or unstable (wobbly)  · You are unable to put any weight on your ankle or foot  · Your swelling has increased or returned  Contact your healthcare provider if:   · Your pain does not go away, even after treatment  · You have questions or concerns about your condition or care  Medicines: You may need any of the following:  · NSAIDs , such as ibuprofen, help decrease swelling, pain, and fever  This medicine is available with or without a doctor's order  NSAIDs can cause stomach bleeding or kidney problems in certain people  If you take blood thinner medicine, always ask your healthcare provider if NSAIDs are safe for you  Always read the medicine label and follow directions  · Acetaminophen  decreases pain  It is available without a doctor's order  Ask how much to take and how often to take it  Follow directions  Acetaminophen can cause liver damage if not taken correctly  · Prescription pain medicine  may be given  Ask how to take this medicine safely  · Take your medicine as directed  Contact your healthcare provider if you think your medicine is not helping or if you have side effects  Tell him or her if you are allergic to any medicine  Keep a list of the medicines, vitamins, and herbs you take  Include the amounts, and when and why you take them  Bring the list or the pill bottles to follow-up visits  Carry your medicine list with you in case of an emergency    Self care:   · Use support devices,  such as a brace, cast, or splint, may be needed to limit your movement and protect your joint  You may need to use crutches to decrease your pain as you move around  · Go to physical therapy as directed  A physical therapist teaches you exercises to help improve movement and strength, and to decrease pain  · Rest  your ankle so that it can heal  Return to normal activities as directed  · Apply ice on your ankle for 15 to 20 minutes every hour or as directed  Use an ice pack, or put crushed ice in a plastic bag  Cover it with a towel  Ice helps prevent tissue damage and decreases swelling and pain  · Compress  your ankle  Ask if you should wrap an elastic bandage around your injured ligament  An elastic bandage provides support and helps decrease swelling and movement so your joint can heal  Wear as long as directed  · Elevate  your ankle above the level of your heart as often as you can  This will help decrease swelling and pain  Prop your ankle on pillows or blankets to keep it elevated comfortably  Prevent another ankle sprain:   · Let your ankle heal   Find out how long your ligament needs to heal  Do not do any physical activity until your healthcare provider says it is okay  If you start activity too soon, you may develop a more serious injury  · Always warm up and stretch  before you exercise or play sports  · Use the right equipment  Always wear shoes that fit well and are made for the activity that you are doing  You may also need ankle supports, elbow and knee pads, or braces  Follow up with your healthcare provider as directed:  Write down your questions so you remember to ask them during your visits  © 2017 [address] Information is for End User's use only and may not be sold, redistributed or otherwise used for commercial purposes  All illustrations and images included in CareNotes® are the copyrighted property of [employer]  or Melvin Martinez    The above information is an  only  It is not intended as medical advice for individual conditions or treatments  Talk to your doctor, nurse or pharmacist before following any medical regimen to see if it is safe and effective for you

## 2019-09-26 ENCOUNTER — TELEPHONE (OUTPATIENT)
Dept: URGENT CARE | Facility: CLINIC | Age: 74
End: 2019-09-26

## 2019-09-26 ENCOUNTER — OFFICE VISIT (OUTPATIENT)
Dept: OBGYN CLINIC | Facility: MEDICAL CENTER | Age: 74
End: 2019-09-26
Payer: MEDICARE

## 2019-09-26 VITALS
WEIGHT: 133 LBS | DIASTOLIC BLOOD PRESSURE: 70 MMHG | BODY MASS INDEX: 22.71 KG/M2 | HEIGHT: 64 IN | SYSTOLIC BLOOD PRESSURE: 139 MMHG

## 2019-09-26 DIAGNOSIS — S92.155A CLOSED NONDISPLACED AVULSION FRACTURE OF LEFT TALUS, INITIAL ENCOUNTER: Primary | ICD-10-CM

## 2019-09-26 PROCEDURE — 99203 OFFICE O/P NEW LOW 30 MIN: CPT | Performed by: ORTHOPAEDIC SURGERY

## 2019-09-26 NOTE — TELEPHONE ENCOUNTER
Initial radiology report read as nl  Today an additional read came through as an avulsion fracture of the dorsal talus  Called pt and informed of new results  Referral entered for Orthopedic   Pt to be scheduled  Will call her with appt place, date, and time    Pt in agreement with plan

## 2019-09-26 NOTE — PROGRESS NOTES
Ortho Sports Medicine New Patient Ankle Visit    Assesment:  69year old Female avulsion fracture of dorsal talus    Plan:    1  WBAT to the LLE in the CAM boot, CAM boot placed in the office today, may remove the boot for sleeping and showering   2  PT was written  3  Ice, Analgesics, and elevation    Follow up: Return in about 4 weeks (around 10/24/2019) for with new xrays  No chief complaint on file  History of Present Illness: The patient is a 69 y o  female whose occupation is retired referred to me by urgent care, seen in clinic for consultation of left ankle pain  Pain is located anterior  and lateral   The patient rates the pain as a 7/10  The pain has been present for 3 days  The patient sustained an injury on 9/23/19  The mechanism of injury was a fall  She was coming down the stairs with the vacuum and missed the last stair and fell  Following the fall, she went to the urgent care and obtained an xray that demonstrated a fracture  She was placed in a stir up splint  The pain is characterized as sharp, stabbing, dull, achy  The pain is present at all times  Pain is improved by rest   Pain is aggravated by weight bearing  Symptoms include swelling  The patient has tried rest, ice, NSAIDS and bracing  Ankle Surgical History:  None      Past Medical, Social and Family History:  History reviewed  No pertinent past medical history  History reviewed  No pertinent surgical history    Allergies   Allergen Reactions    Penicillins      Childhood     Current Outpatient Medications on File Prior to Visit   Medication Sig Dispense Refill    ALPRAZolam (XANAX) 0 25 mg tablet Take 1 tablet by mouth daily as needed      amLODIPine (NORVASC) 2 5 mg tablet TAKE ONE TABLET BY MOUTH ONCE DAILY 90 tablet 3    Cholecalciferol (VITAMIN D3) 1000 units CAPS Take 2 capsules by mouth daily      omeprazole (PriLOSEC) 20 mg delayed release capsule Take by mouth       No current facility-administered medications on file prior to visit  Social History     Socioeconomic History    Marital status: /Civil Union     Spouse name: Not on file    Number of children: Not on file    Years of education: Not on file    Highest education level: Not on file   Occupational History    Occupation: retired    Social Needs    Financial resource strain: Not on file    Food insecurity:     Worry: Not on file     Inability: Not on file   Noom needs:     Medical: Not on file     Non-medical: Not on file   Tobacco Use    Smoking status: Never Smoker    Smokeless tobacco: Never Used    Tobacco comment: No secondhand smoke exposure    Substance and Sexual Activity    Alcohol use: No    Drug use: Not on file    Sexual activity: Not on file   Lifestyle    Physical activity:     Days per week: Not on file     Minutes per session: Not on file    Stress: Not on file   Relationships    Social connections:     Talks on phone: Not on file     Gets together: Not on file     Attends Church service: Not on file     Active member of club or organization: Not on file     Attends meetings of clubs or organizations: Not on file     Relationship status: Not on file    Intimate partner violence:     Fear of current or ex partner: Not on file     Emotionally abused: Not on file     Physically abused: Not on file     Forced sexual activity: Not on file   Other Topics Concern    Not on file   Social History Narrative    Not on file         I have reviewed the past medical, surgical, social and family history, medications and allergies as documented in the EMR  Review of systems: ROS is negative other than that noted in the HPI  Constitutional: Negative for fatigue and fever  HENT: Negative for sore throat  Respiratory: Negative for shortness of breath  Cardiovascular: Negative for chest pain  Gastrointestinal: Negative for abdominal pain     Endocrine: Negative for cold intolerance and heat intolerance  Genitourinary: Negative for flank pain  Musculoskeletal: Negative for back pain  Skin: Negative for rash  Allergic/Immunologic: Negative for immunocompromised state  Neurological: Negative for dizziness  Psychiatric/Behavioral: Negative for agitation  Physical Exam:    Blood pressure 139/70, height 5' 4" (1 626 m), weight 60 3 kg (133 lb)  General/Constitutional: NAD, well developed, well nourished  HENT: Normocephalic, atraumatic  CV: Intact distal pulses, regular rate  Resp: No respiratory distress or labored breathing  Lymphatic: No lymphadenopathy palpated  Neuro: Alert and Oriented x 3, no focal deficits  Psych: Normal mood, normal affect, normal judgement, normal behavior  Skin: Warm, dry, no rashes, no erythema       Ankle Examination (focused): RIGHT LEFT   ROM:  Full Full   Palpation:  Non-ttp Talus, lateral ligaments    Anterior Drawer negative negative   Calcaneal inversion negative negative   Squeeze Test negative negative       No subluxation of the peroneal tendons or tenderness to palpation along the peroneal tendons     No pain with palpation or range of motion of midfoot and forefoot bilaterally    No limp upon gait exam    No calf tenderness to palpation bilaterally    LE NV Exam: +2 DP/PT pulses bilaterally  Sensation intact to light touch L2-S1 bilaterally        Ankle Imaging:    X-rays of the left foot/ankle were reviewed, which demonstrates avulsion fracture dorsal talus  I have reviewed the radiology report and agree with their impression

## 2019-09-30 ENCOUNTER — TELEPHONE (OUTPATIENT)
Dept: OBGYN CLINIC | Facility: HOSPITAL | Age: 74
End: 2019-09-30

## 2019-09-30 NOTE — TELEPHONE ENCOUNTER
Patient left voicemail to please call her  She has questions regarding what she is doing and about her brace     [phone]

## 2019-10-01 NOTE — TELEPHONE ENCOUNTER
Patient is calling upset that she has not heard from anyone at this time  Patient is stating that she was not able to sleep last night  She doesn't know if she has the brace on correctly & she has a lot of black & blue going on around her ankle    cb [phone]

## 2019-10-01 NOTE — TELEPHONE ENCOUNTER
Spoke to patient  She stated that she is experiencing increased spreading of the bruise and swelling  Advised her that this is normal as swelling and bruising go with gravity  She stated she is taking aleve but no tylenol  Advised tylenol 1000 mg Q8h no more than 3000 mg a day  Patient verbalized understanding  She stated the brace feels loose, advised her to adjust straps to where the boot feels snug but not causing increased pain  Patient verbalized understanding  Advised icing and elevating as often as possible, getting up hourly to prevent blood clots  Patient verbalized understanding and encouraged to call back with further questions or concerns

## 2019-10-31 ENCOUNTER — OFFICE VISIT (OUTPATIENT)
Dept: OBGYN CLINIC | Facility: MEDICAL CENTER | Age: 74
End: 2019-10-31
Payer: MEDICARE

## 2019-10-31 ENCOUNTER — APPOINTMENT (OUTPATIENT)
Dept: RADIOLOGY | Facility: MEDICAL CENTER | Age: 74
End: 2019-10-31
Payer: MEDICARE

## 2019-10-31 VITALS
BODY MASS INDEX: 22.71 KG/M2 | WEIGHT: 133 LBS | HEART RATE: 78 BPM | HEIGHT: 64 IN | DIASTOLIC BLOOD PRESSURE: 78 MMHG | SYSTOLIC BLOOD PRESSURE: 139 MMHG

## 2019-10-31 DIAGNOSIS — S92.155A CLOSED NONDISPLACED AVULSION FRACTURE OF LEFT TALUS, INITIAL ENCOUNTER: ICD-10-CM

## 2019-10-31 DIAGNOSIS — S92.155A CLOSED NONDISPLACED AVULSION FRACTURE OF LEFT TALUS, INITIAL ENCOUNTER: Primary | ICD-10-CM

## 2019-10-31 PROCEDURE — 73610 X-RAY EXAM OF ANKLE: CPT

## 2019-10-31 PROCEDURE — 99213 OFFICE O/P EST LOW 20 MIN: CPT | Performed by: ORTHOPAEDIC SURGERY

## 2019-10-31 NOTE — PROGRESS NOTES
Ortho Sports Medicine Follow-Up Ankle Visit    Assesment:  69year old Female avulsion fracture of dorsal talus doing well at this time    Plan:    1  PT ordered   2  Can transition out of boot with help of PT to hard sole shoe  3  WBAT to LLE      Follow up: No follow-ups on file  Chief Complaint   Patient presents with    Left Ankle - Fracture, Follow-up     History of Present Illness: The patient is returns for follow up of left ankle  Since the prior visit, She reports someimprovement  At Today's visit, the pain is located anterior  and lateral  The pain is characterized as sharp, stabbing, burning  The pain is present at all times  Pain is improved by rest and bracing  Pain is aggravated by weight bearing and walking  Symptoms include swelling  The patient has tried rest, ice and bracing  The patient has weakness  The patient denies numbness and tingling  I have reviewed the past medical, surgical, social and family history, medications and allergies as documented in the EMR  Ankle Surgical History:  None    Past Medical, Social and Family History:  History reviewed  No pertinent past medical history  History reviewed  No pertinent surgical history  Allergies   Allergen Reactions    Penicillins      Childhood     Current Outpatient Medications on File Prior to Visit   Medication Sig Dispense Refill    ALPRAZolam (XANAX) 0 25 mg tablet Take 1 tablet by mouth daily as needed      amLODIPine (NORVASC) 2 5 mg tablet TAKE ONE TABLET BY MOUTH ONCE DAILY 90 tablet 3    Cholecalciferol (VITAMIN D3) 1000 units CAPS Take 2 capsules by mouth daily      omeprazole (PriLOSEC) 20 mg delayed release capsule Take by mouth       No current facility-administered medications on file prior to visit        Social History     Socioeconomic History    Marital status: /Civil Union     Spouse name: Not on file    Number of children: Not on file    Years of education: Not on file    Highest education level: Not on file   Occupational History    Occupation: retired    Social Needs    Financial resource strain: Not on file    Food insecurity:     Worry: Not on file     Inability: Not on file   Breeze needs:     Medical: Not on file     Non-medical: Not on file   Tobacco Use    Smoking status: Never Smoker    Smokeless tobacco: Never Used    Tobacco comment: No secondhand smoke exposure    Substance and Sexual Activity    Alcohol use: No    Drug use: Not on file    Sexual activity: Not on file   Lifestyle    Physical activity:     Days per week: Not on file     Minutes per session: Not on file    Stress: Not on file   Relationships    Social connections:     Talks on phone: Not on file     Gets together: Not on file     Attends Baptism service: Not on file     Active member of club or organization: Not on file     Attends meetings of clubs or organizations: Not on file     Relationship status: Not on file    Intimate partner violence:     Fear of current or ex partner: Not on file     Emotionally abused: Not on file     Physically abused: Not on file     Forced sexual activity: Not on file   Other Topics Concern    Not on file   Social History Narrative    Not on file     I have reviewed the past medical, surgical, social and family history, medications and allergies as documented in the EMR  Review of systems: ROS is negative other than that noted in the HPI  Constitutional: Negative for fatigue and fever  HENT: Negative for sore throat  Respiratory: Negative for shortness of breath  Cardiovascular: Negative for chest pain  Gastrointestinal: Negative for abdominal pain  Endocrine: Negative for cold intolerance and heat intolerance  Genitourinary: Negative for flank pain  Musculoskeletal: Negative for back pain  Skin: Negative for rash  Allergic/Immunologic: Negative for immunocompromised state  Neurological: Negative for dizziness  Psychiatric/Behavioral: Negative for agitation  Physical Exam:    Blood pressure 139/78, pulse 78, height 5' 4" (1 626 m), weight 60 3 kg (133 lb)  General/Constitutional: NAD, well developed, well nourished  HENT: Normocephalic, atraumatic  CV: Intact distal pulses, regular rate  Resp: No respiratory distress or labored breathing  Lymphatic: No lymphadenopathy palpated  Neuro: Alert and Oriented x 3, no focal deficits  Psych: Normal mood, normal affect, normal judgement, normal behavior  Skin: Warm, dry, no rashes, no erythema     Ankle Examination (focused): RIGHT LEFT   ROM:  Full Dorsiflexion 10 deg plantarflexion 20 deg   Palpation:  No ttp Talus, lateral ligaments   Anterior Drawer negative negative   Calcaneal inversion negative negative   Squeeze Test negative negative     No subluxation of the peroneal tendons or tenderness to palpation along the peroneal tendons  No limp upon gait exam     No calf tenderness to palpation bilaterally    LE NV Exam: +2 DP/PT pulses bilaterally  Sensation intact to light touch L2-S1 bilaterally    Ankle Imaging:    X-rays of the left foot/ankle were reviewed, which demonstrate appropriate but minimal healing of an avulsion fracture on dorsal talus  I have reviewed the radiology report and do not currently have a radiology reading from [facility], but will check the result once the reading is performed

## 2019-11-04 ENCOUNTER — TELEPHONE (OUTPATIENT)
Dept: OBGYN CLINIC | Facility: HOSPITAL | Age: 74
End: 2019-11-04

## 2019-11-04 NOTE — TELEPHONE ENCOUNTER
Spoke with patient  She stated she is having a lot of concerns  She stated she is continuing to have pain with activity  Advised that this is normal to have pain with increased and swelling as activity continues  Advised icing and elevating as often as possible when she feels the pain  She would like a call from Dr Adrienne Simeon himself for reassurance, as she has high anxiety about this  Please advise

## 2019-11-04 NOTE — TELEPHONE ENCOUNTER
I spoke to the patient  She is having an expected amount of pain    She will see me at her expected next visit

## 2019-11-04 NOTE — TELEPHONE ENCOUNTER
Patient is calling with questions    [phone]    Patient is stating that she is very anxious because when she was there she was reassured by Dr Mare Felix that her lt ankle would heal in 6 weeks but when she went back that was not what he told her  Patient is not sure if it is healed or not  Patient is afraid to do physical therapy because she's afraid that it is going to break again  Patient is stating that she has anxiety & is she states that she is sitting there shaking because she doesn't know  Patient has other questions about wearing socks to bed & pain from the boot  She is also asking what level of pain she should be at at this time

## 2019-12-12 ENCOUNTER — APPOINTMENT (OUTPATIENT)
Dept: RADIOLOGY | Facility: MEDICAL CENTER | Age: 74
End: 2019-12-12
Payer: MEDICARE

## 2019-12-12 ENCOUNTER — OFFICE VISIT (OUTPATIENT)
Dept: OBGYN CLINIC | Facility: MEDICAL CENTER | Age: 74
End: 2019-12-12
Payer: MEDICARE

## 2019-12-12 VITALS
SYSTOLIC BLOOD PRESSURE: 140 MMHG | HEIGHT: 64 IN | WEIGHT: 133 LBS | DIASTOLIC BLOOD PRESSURE: 79 MMHG | HEART RATE: 78 BPM | BODY MASS INDEX: 22.71 KG/M2

## 2019-12-12 DIAGNOSIS — S92.155A CLOSED NONDISPLACED AVULSION FRACTURE OF LEFT TALUS, INITIAL ENCOUNTER: ICD-10-CM

## 2019-12-12 DIAGNOSIS — S92.155A CLOSED NONDISPLACED AVULSION FRACTURE OF LEFT TALUS, INITIAL ENCOUNTER: Primary | ICD-10-CM

## 2019-12-12 PROCEDURE — 99213 OFFICE O/P EST LOW 20 MIN: CPT | Performed by: ORTHOPAEDIC SURGERY

## 2019-12-12 PROCEDURE — 73610 X-RAY EXAM OF ANKLE: CPT

## 2019-12-12 NOTE — PROGRESS NOTES
Ortho Sports Medicine Follow-Up Ankle Visit    Assesment:  69 y o  female left ankle healed avulsion fracture of talar dome     Plan:    1  May transition out of the hard sole shoe nto any shoe wear she would like  2  Continue with PT under the guidance of the therapist   3  Ice and analgesics as needed    Follow up: Return if symptoms worsen or fail to improve  Chief Complaint   Patient presents with    Left Ankle - Follow-up, Fracture       History of Present Illness: The patient is returns for follow up of Left ankle fracture  Since the prior visit, She reports significant improvement  She has transitioned out of the Cam boot into a hard-soled shoe  She has continued with physical therapy  She states that her pain has greatly improved and she has noticed a reduce her swelling of her ankle  She states she will experience pain after physical therapy that dissipates later that day  Pain is improved by rest, ice, NSAIDS, physical therapy and bracing  Pain is aggravated by weight bearing  Symptoms include swelling  The patient has tried rest, ice, NSAIDS, physical therapy and bracing  The patient denies weakness  The patient denies numbness and tingling  I have reviewed the past medical, surgical, social and family history, medications and allergies as documented in the EMR  Past Medical, Social and Family History:  History reviewed  No pertinent past medical history  History reviewed  No pertinent surgical history    Allergies   Allergen Reactions    Penicillins      Childhood     Current Outpatient Medications on File Prior to Visit   Medication Sig Dispense Refill    ALPRAZolam (XANAX) 0 25 mg tablet Take 1 tablet by mouth daily as needed      amLODIPine (NORVASC) 2 5 mg tablet TAKE ONE TABLET BY MOUTH ONCE DAILY 90 tablet 3    Cholecalciferol (VITAMIN D3) 1000 units CAPS Take 2 capsules by mouth daily      omeprazole (PriLOSEC) 20 mg delayed release capsule Take by mouth       No current facility-administered medications on file prior to visit  Social History     Socioeconomic History    Marital status: /Civil Union     Spouse name: Not on file    Number of children: Not on file    Years of education: Not on file    Highest education level: Not on file   Occupational History    Occupation: retired    Social Needs    Financial resource strain: Not on file    Food insecurity:     Worry: Not on file     Inability: Not on file   Acucela needs:     Medical: Not on file     Non-medical: Not on file   Tobacco Use    Smoking status: Never Smoker    Smokeless tobacco: Never Used    Tobacco comment: No secondhand smoke exposure    Substance and Sexual Activity    Alcohol use: No    Drug use: Not on file    Sexual activity: Not on file   Lifestyle    Physical activity:     Days per week: Not on file     Minutes per session: Not on file    Stress: Not on file   Relationships    Social connections:     Talks on phone: Not on file     Gets together: Not on file     Attends Muslim service: Not on file     Active member of club or organization: Not on file     Attends meetings of clubs or organizations: Not on file     Relationship status: Not on file    Intimate partner violence:     Fear of current or ex partner: Not on file     Emotionally abused: Not on file     Physically abused: Not on file     Forced sexual activity: Not on file   Other Topics Concern    Not on file   Social History Narrative    Not on file         I have reviewed the past medical, surgical, social and family history, medications and allergies as documented in the EMR  Review of systems: ROS is negative other than that noted in the HPI  Constitutional: Negative for fatigue and fever  HENT: Negative for sore throat  Respiratory: Negative for shortness of breath  Cardiovascular: Negative for chest pain  Gastrointestinal: Negative for abdominal pain     Endocrine: Negative for cold intolerance and heat intolerance  Genitourinary: Negative for flank pain  Musculoskeletal: Negative for back pain  Skin: Negative for rash  Allergic/Immunologic: Negative for immunocompromised state  Neurological: Negative for dizziness  Psychiatric/Behavioral: Negative for agitation  Physical Exam:    Blood pressure 140/79, pulse 78, height 5' 4" (1 626 m), weight 60 3 kg (133 lb)  General/Constitutional: NAD, well developed, well nourished  HENT: Normocephalic, atraumatic  CV: Intact distal pulses, regular rate  Resp: No respiratory distress or labored breathing  Lymphatic: No lymphadenopathy palpated  Neuro: Alert and Oriented x 3, no focal deficits  Psych: Normal mood, normal affect, normal judgement, normal behavior  Skin: Warm, dry, no rashes, no erythema       Ankle Examination (focused): RIGHT LEFT   ROM:  Full Full   Palpation:  Non-ttp Non-ttp   Anterior Drawer negative negative   Calcaneal inversion negative negative   Squeeze Test negative negative       No subluxation of the peroneal tendons or tenderness to palpation along the peroneal tendons     No pain with palpation or range of motion of midfoot and forefoot bilaterally    No limp upon gait exam    No calf tenderness to palpation bilaterally    LE NV Exam: +2 DP/PT pulses bilaterally  Sensation intact to light touch L2-S1 bilaterally        Ankle Imaging:    X-rays of the left foot/ankle were reviewed, which demonstrate healed talar dome fracture with bony callus  I have reviewed the radiology report and do not currently have a radiology reading from [facility], but will check the result once the reading is performed

## 2019-12-16 DIAGNOSIS — I10 HYPERTENSION, UNSPECIFIED TYPE: ICD-10-CM

## 2019-12-17 RX ORDER — AMLODIPINE BESYLATE 2.5 MG/1
TABLET ORAL
Qty: 90 TABLET | Refills: 0 | Status: SHIPPED | OUTPATIENT
Start: 2019-12-17 | End: 2020-03-04 | Stop reason: SDUPTHER

## 2019-12-17 NOTE — TELEPHONE ENCOUNTER
Please call pt and have her make an OV with me for chronic conditions as it has been over one year  No more refills  Thank you

## 2019-12-18 NOTE — TELEPHONE ENCOUNTER
Pt said she fell and broke her leg was out of commission  She is on Tylenol and Aleve for physical therapy and does not want it to affect her blood work   She has about another month of PT

## 2020-03-03 ENCOUNTER — APPOINTMENT (OUTPATIENT)
Dept: LAB | Facility: CLINIC | Age: 75
End: 2020-03-03
Payer: MEDICARE

## 2020-03-03 DIAGNOSIS — E78.00 HYPERCHOLESTEROLEMIA: ICD-10-CM

## 2020-03-03 DIAGNOSIS — I10 HYPERTENSION, UNSPECIFIED TYPE: ICD-10-CM

## 2020-03-03 DIAGNOSIS — M81.0 AGE-RELATED OSTEOPOROSIS WITHOUT CURRENT PATHOLOGICAL FRACTURE: ICD-10-CM

## 2020-03-03 LAB
25(OH)D3 SERPL-MCNC: 63.5 NG/ML (ref 30–100)
ALBUMIN SERPL BCP-MCNC: 3.7 G/DL (ref 3.5–5)
ALP SERPL-CCNC: 73 U/L (ref 46–116)
ALT SERPL W P-5'-P-CCNC: 24 U/L (ref 12–78)
ANION GAP SERPL CALCULATED.3IONS-SCNC: 4 MMOL/L (ref 4–13)
AST SERPL W P-5'-P-CCNC: 13 U/L (ref 5–45)
BASOPHILS # BLD AUTO: 0.04 THOUSANDS/ΜL (ref 0–0.1)
BASOPHILS NFR BLD AUTO: 1 % (ref 0–1)
BILIRUB SERPL-MCNC: 0.43 MG/DL (ref 0.2–1)
BUN SERPL-MCNC: 26 MG/DL (ref 5–25)
CALCIUM SERPL-MCNC: 9.2 MG/DL (ref 8.3–10.1)
CHLORIDE SERPL-SCNC: 108 MMOL/L (ref 100–108)
CHOLEST SERPL-MCNC: 177 MG/DL (ref 50–200)
CO2 SERPL-SCNC: 28 MMOL/L (ref 21–32)
CREAT SERPL-MCNC: 0.76 MG/DL (ref 0.6–1.3)
EOSINOPHIL # BLD AUTO: 0.14 THOUSAND/ΜL (ref 0–0.61)
EOSINOPHIL NFR BLD AUTO: 2 % (ref 0–6)
ERYTHROCYTE [DISTWIDTH] IN BLOOD BY AUTOMATED COUNT: 13.2 % (ref 11.6–15.1)
GFR SERPL CREATININE-BSD FRML MDRD: 78 ML/MIN/1.73SQ M
GLUCOSE P FAST SERPL-MCNC: 90 MG/DL (ref 65–99)
HCT VFR BLD AUTO: 42 % (ref 34.8–46.1)
HDLC SERPL-MCNC: 55 MG/DL
HGB BLD-MCNC: 13.5 G/DL (ref 11.5–15.4)
IMM GRANULOCYTES # BLD AUTO: 0.01 THOUSAND/UL (ref 0–0.2)
IMM GRANULOCYTES NFR BLD AUTO: 0 % (ref 0–2)
LDLC SERPL CALC-MCNC: 109 MG/DL (ref 0–100)
LYMPHOCYTES # BLD AUTO: 2.41 THOUSANDS/ΜL (ref 0.6–4.47)
LYMPHOCYTES NFR BLD AUTO: 40 % (ref 14–44)
MCH RBC QN AUTO: 29.9 PG (ref 26.8–34.3)
MCHC RBC AUTO-ENTMCNC: 32.1 G/DL (ref 31.4–37.4)
MCV RBC AUTO: 93 FL (ref 82–98)
MONOCYTES # BLD AUTO: 0.48 THOUSAND/ΜL (ref 0.17–1.22)
MONOCYTES NFR BLD AUTO: 8 % (ref 4–12)
NEUTROPHILS # BLD AUTO: 2.94 THOUSANDS/ΜL (ref 1.85–7.62)
NEUTS SEG NFR BLD AUTO: 49 % (ref 43–75)
NRBC BLD AUTO-RTO: 0 /100 WBCS
PLATELET # BLD AUTO: 256 THOUSANDS/UL (ref 149–390)
PMV BLD AUTO: 9.9 FL (ref 8.9–12.7)
POTASSIUM SERPL-SCNC: 4.3 MMOL/L (ref 3.5–5.3)
PROT SERPL-MCNC: 7.3 G/DL (ref 6.4–8.2)
RBC # BLD AUTO: 4.51 MILLION/UL (ref 3.81–5.12)
SODIUM SERPL-SCNC: 140 MMOL/L (ref 136–145)
TRIGL SERPL-MCNC: 64 MG/DL
WBC # BLD AUTO: 6.02 THOUSAND/UL (ref 4.31–10.16)

## 2020-03-03 PROCEDURE — 85025 COMPLETE CBC W/AUTO DIFF WBC: CPT

## 2020-03-03 PROCEDURE — 36415 COLL VENOUS BLD VENIPUNCTURE: CPT

## 2020-03-03 PROCEDURE — 80053 COMPREHEN METABOLIC PANEL: CPT

## 2020-03-03 PROCEDURE — 80061 LIPID PANEL: CPT

## 2020-03-03 PROCEDURE — 82306 VITAMIN D 25 HYDROXY: CPT

## 2020-03-03 NOTE — ASSESSMENT & PLAN NOTE
Patient interested in DEXA  Order placed  She will continue weight-bearing exercises calcium twice daily vitamin-D and avoidance of excessive alcohol or caffeine

## 2020-03-04 ENCOUNTER — OFFICE VISIT (OUTPATIENT)
Dept: FAMILY MEDICINE CLINIC | Facility: CLINIC | Age: 75
End: 2020-03-04
Payer: MEDICARE

## 2020-03-04 VITALS
WEIGHT: 133 LBS | HEIGHT: 64 IN | HEART RATE: 88 BPM | DIASTOLIC BLOOD PRESSURE: 60 MMHG | RESPIRATION RATE: 16 BRPM | TEMPERATURE: 98.4 F | BODY MASS INDEX: 22.71 KG/M2 | SYSTOLIC BLOOD PRESSURE: 140 MMHG

## 2020-03-04 DIAGNOSIS — C43.9 MALIGNANT MELANOMA OF SKIN (HCC): ICD-10-CM

## 2020-03-04 DIAGNOSIS — F41.1 GENERALIZED ANXIETY DISORDER: ICD-10-CM

## 2020-03-04 DIAGNOSIS — I10 HYPERTENSION, UNSPECIFIED TYPE: ICD-10-CM

## 2020-03-04 DIAGNOSIS — Z23 ENCOUNTER FOR IMMUNIZATION: ICD-10-CM

## 2020-03-04 DIAGNOSIS — Z00.00 HEALTHCARE MAINTENANCE: ICD-10-CM

## 2020-03-04 DIAGNOSIS — K21.9 GERD WITHOUT ESOPHAGITIS: ICD-10-CM

## 2020-03-04 DIAGNOSIS — M81.0 AGE-RELATED OSTEOPOROSIS WITHOUT CURRENT PATHOLOGICAL FRACTURE: ICD-10-CM

## 2020-03-04 DIAGNOSIS — I72.8 SPLENIC ARTERY ANEURYSM (HCC): ICD-10-CM

## 2020-03-04 DIAGNOSIS — I10 ESSENTIAL HYPERTENSION: Primary | ICD-10-CM

## 2020-03-04 DIAGNOSIS — E78.00 HYPERCHOLESTEROLEMIA: ICD-10-CM

## 2020-03-04 PROBLEM — M75.80 OTHER SHOULDER LESIONS, UNSPECIFIED SHOULDER: Status: ACTIVE | Noted: 2020-03-04

## 2020-03-04 PROBLEM — IMO0002 OTHER SPECIFIED DISORDERS OF ROTATOR CUFF SYNDROME OF SHOULDER AND ALLIED DISORDERS: Status: ACTIVE | Noted: 2020-03-04

## 2020-03-04 PROCEDURE — 99214 OFFICE O/P EST MOD 30 MIN: CPT | Performed by: PHYSICIAN ASSISTANT

## 2020-03-04 PROCEDURE — G0009 ADMIN PNEUMOCOCCAL VACCINE: HCPCS

## 2020-03-04 PROCEDURE — 90732 PPSV23 VACC 2 YRS+ SUBQ/IM: CPT

## 2020-03-04 PROCEDURE — 3008F BODY MASS INDEX DOCD: CPT | Performed by: PHYSICIAN ASSISTANT

## 2020-03-04 PROCEDURE — 3077F SYST BP >= 140 MM HG: CPT | Performed by: PHYSICIAN ASSISTANT

## 2020-03-04 PROCEDURE — 3078F DIAST BP <80 MM HG: CPT | Performed by: PHYSICIAN ASSISTANT

## 2020-03-04 PROCEDURE — 1160F RVW MEDS BY RX/DR IN RCRD: CPT | Performed by: PHYSICIAN ASSISTANT

## 2020-03-04 PROCEDURE — 4040F PNEUMOC VAC/ADMIN/RCVD: CPT | Performed by: PHYSICIAN ASSISTANT

## 2020-03-04 PROCEDURE — 1036F TOBACCO NON-USER: CPT | Performed by: PHYSICIAN ASSISTANT

## 2020-03-04 RX ORDER — AMLODIPINE BESYLATE 2.5 MG/1
2.5 TABLET ORAL DAILY
Qty: 90 TABLET | Refills: 2 | Status: SHIPPED | OUTPATIENT
Start: 2020-03-04 | End: 2020-12-04 | Stop reason: SDUPTHER

## 2020-03-04 NOTE — PATIENT INSTRUCTIONS
Problem List Items Addressed This Visit        Digestive    GERD without esophagitis       Cardiovascular and Mediastinum    Essential hypertension - Primary    Relevant Orders    Comprehensive metabolic panel    CBC and differential    Splenic artery aneurysm (HCC)     Pt  does not want to obtain a CT  Musculoskeletal and Integument    Malignant melanoma of skin [facility])     Continue dermatology  Age-related osteoporosis without current pathological fracture     Patient interested in DEXA  Order placed  She will continue weight-bearing exercises calcium twice daily vitamin-D and avoidance of excessive alcohol or caffeine  Relevant Orders    DXA bone density spine hip and pelvis       Other    Generalized anxiety disorder    Hypercholesterolemia     LDL was stable w/o meds with LDL of 109  Relevant Orders    Lipid panel    Healthcare maintenance     Pneumonia 23 given today  Discussed new shingles vaccine to get at pharmacy

## 2020-03-04 NOTE — PROGRESS NOTES
Assessment and Plan:    Problem List Items Addressed This Visit        Digestive    GERD without esophagitis       Cardiovascular and Mediastinum    Essential hypertension - Primary    Relevant Orders    Comprehensive metabolic panel    CBC and differential    Splenic artery aneurysm (HCC)     Pt  does not want to obtain a CT  Musculoskeletal and Integument    Malignant melanoma of skin [facility])     Continue dermatology  Age-related osteoporosis without current pathological fracture     Patient interested in DEXA  Order placed  She will continue weight-bearing exercises calcium twice daily vitamin-D and avoidance of excessive alcohol or caffeine  Relevant Orders    DXA bone density spine hip and pelvis       Other    Generalized anxiety disorder    Hypercholesterolemia     LDL was stable w/o meds with LDL of 109  Relevant Orders    Lipid panel    Healthcare maintenance     Pneumonia 23 given today  Discussed new shingles vaccine to get at pharmacy  Diagnoses and all orders for this visit:    Essential hypertension  -     Comprehensive metabolic panel; Future  -     CBC and differential; Future    GERD without esophagitis    Age-related osteoporosis without current pathological fracture  -     DXA bone density spine hip and pelvis; Future    Malignant melanoma of skin (HCC)    Generalized anxiety disorder    Healthcare maintenance    Splenic artery aneurysm (HCC)    Hypercholesterolemia  -     Lipid panel; Future              Subjective:      Patient ID: Jeannie David is a 70 y o  female  CC:    Chief Complaint   Patient presents with    Follow-up     Patient present today for a follow up on her medications  HPI:      Jeannie David is here for chronic conditions f/u including the diagnosis of Essential hypertension  (primary encounter diagnosis)  Gerd without esophagitis  Age-related osteoporosis without current pathological fracture    Pt  states they are taking all medications as directed without complaints or side effects   Pt  had labs done prior to today's visit which included Recent Results (from the past 672 hour(s))  -Lipid Panel with Direct LDL reflex  Collection Time: 03/03/20  7:36 AM       Result                      Value             Ref Range           Cholesterol                 177               50 - 200 mg/*       Triglycerides               64                <=150 mg/dL         HDL, Direct                 55                >=40 mg/dL          LDL Calculated              109 (H)           0 - 100 mg/dL  -Comprehensive metabolic panel  Collection Time: 03/03/20  7:36 AM       Result                      Value             Ref Range           Sodium                      140               136 - 145 mm*       Potassium                   4 3               3 5 - 5 3 mm*       Chloride                    108               100 - 108 mm*       CO2                         28                21 - 32 mmol*       ANION GAP                   4                 4 - 13 mmol/L       BUN                         26 (H)            5 - 25 mg/dL        Creatinine                  0 76              0 60 - 1 30 *       Glucose, Fasting            90                65 - 99 mg/dL       Calcium                     9 2               8 3 - 10 1 m*       AST                         13                5 - 45 U/L          ALT                         24                12 - 78 U/L         Alkaline Phosphatase        73                46 - 116 U/L        Total Protein               7 3               6 4 - 8 2 g/*       Albumin                     3 7               3 5 - 5 0 g/*       Total Bilirubin             0 43              0 20 - 1 00 *       eGFR                        78                ml/min/1 73s*  -CBC and differential  Collection Time: 03/03/20  7:36 AM       Result                      Value             Ref Range           WBC                         6 02 4 31 - 10 16*       RBC                         4 51              3 81 - 5 12 *       Hemoglobin                  13 5              11 5 - 15 4 *       Hematocrit                  42 0              34 8 - 46 1 %       MCV                         93                82 - 98 fL          MCH                         29 9              26 8 - 34 3 *       MCHC                        32 1              31 4 - 37 4 *       RDW                         13 2              11 6 - 15 1 %       MPV                         9 9               8 9 - 12 7 fL       Platelets                   256               149 - 390 Th*       nRBC                        0                 /100 WBCs           Neutrophils Relative        49                43 - 75 %           Immat GRANS %               0                 0 - 2 %             Lymphocytes Relative        40                14 - 44 %           Monocytes Relative          8                 4 - 12 %            Eosinophils Relative        2                 0 - 6 %             Basophils Relative          1                 0 - 1 %             Neutrophils Absolute        2 94              1 85 - 7 62 *       Immature Grans Absolute     0 01              0 00 - 0 20 *       Lymphocytes Absolute        2 41              0 60 - 4 47 *       Monocytes Absolute          0 48              0 17 - 1 22 *       Eosinophils Absolute        0 14              0 00 - 0 61 *       Basophils Absolute          0 04              0 00 - 0 10 *  -Vitamin D 25 hydroxy  Collection Time: 03/03/20  7:36 AM       Result                      Value             Ref Range           Vit D, 25-Hydroxy           63 5              30 0 - 100 0*      Pt  did not come into office at her last apt as she injured her foot and had a fracture         The following portions of the patient's history were reviewed and updated as appropriate: allergies, current medications, past family history, past medical history, past social history, past surgical history and problem list       Review of Systems   Constitutional: Negative  HENT: Negative  Eyes: Negative  Respiratory: Negative  Cardiovascular: Negative  Gastrointestinal: Negative  Endocrine: Negative  Genitourinary: Negative  Musculoskeletal: Negative  Skin: Negative  Allergic/Immunologic: Negative  Neurological: Negative  Hematological: Negative  Psychiatric/Behavioral: Negative  Data to review:       Objective:    Vitals:    03/04/20 1059 03/04/20 1137   BP: 158/70 140/60   BP Location: Left arm    Patient Position: Sitting    Cuff Size: Standard    Pulse: 88    Resp: 16    Temp: 98 4 °F (36 9 °C)    TempSrc: Temporal    Weight: 60 3 kg (133 lb)    Height: 5' 4" (1 626 m)         Physical Exam   Constitutional: She is oriented to person, place, and time  She appears well-developed and well-nourished  No distress  HENT:   Head: Normocephalic and atraumatic  Eyes: Conjunctivae are normal  Right eye exhibits no discharge  Left eye exhibits no discharge  Neck: Neck supple  Carotid bruit is not present  Cardiovascular: Normal rate, regular rhythm and normal heart sounds  Exam reveals no gallop and no friction rub  No murmur heard  Pulmonary/Chest: Effort normal and breath sounds normal  No respiratory distress  She has no wheezes  She has no rales  Neurological: She is alert and oriented to person, place, and time  Skin: Skin is warm and dry  She is not diaphoretic  Psychiatric: She has a normal mood and affect  Judgment normal    Nursing note and vitals reviewed

## 2020-10-08 ENCOUNTER — IMMUNIZATIONS (OUTPATIENT)
Dept: FAMILY MEDICINE CLINIC | Facility: CLINIC | Age: 75
End: 2020-10-08
Payer: MEDICARE

## 2020-10-08 DIAGNOSIS — Z23 ENCOUNTER FOR IMMUNIZATION: ICD-10-CM

## 2020-10-08 PROCEDURE — G0008 ADMIN INFLUENZA VIRUS VAC: HCPCS

## 2020-10-08 PROCEDURE — 90662 IIV NO PRSV INCREASED AG IM: CPT

## 2020-12-04 DIAGNOSIS — I10 HYPERTENSION, UNSPECIFIED TYPE: ICD-10-CM

## 2020-12-04 RX ORDER — AMLODIPINE BESYLATE 2.5 MG/1
2.5 TABLET ORAL DAILY
Qty: 90 TABLET | Refills: 0 | Status: SHIPPED | OUTPATIENT
Start: 2020-12-04 | End: 2021-03-03

## 2021-01-09 ENCOUNTER — OFFICE VISIT (OUTPATIENT)
Dept: FAMILY MEDICINE CLINIC | Facility: CLINIC | Age: 76
End: 2021-01-09
Payer: MEDICARE

## 2021-01-09 VITALS
RESPIRATION RATE: 16 BRPM | TEMPERATURE: 97.6 F | HEART RATE: 88 BPM | DIASTOLIC BLOOD PRESSURE: 64 MMHG | WEIGHT: 132 LBS | BODY MASS INDEX: 22.53 KG/M2 | HEIGHT: 64 IN | SYSTOLIC BLOOD PRESSURE: 148 MMHG

## 2021-01-09 DIAGNOSIS — I10 ESSENTIAL HYPERTENSION: ICD-10-CM

## 2021-01-09 DIAGNOSIS — R01.1 MURMUR, HEART: ICD-10-CM

## 2021-01-09 DIAGNOSIS — N30.00 ACUTE CYSTITIS WITHOUT HEMATURIA: Primary | ICD-10-CM

## 2021-01-09 PROBLEM — R30.0 DYSURIA: Status: ACTIVE | Noted: 2021-01-09

## 2021-01-09 LAB
SL AMB  POCT GLUCOSE, UA: ABNORMAL
SL AMB LEUKOCYTE ESTERASE,UA: ABNORMAL
SL AMB POCT BILIRUBIN,UA: ABNORMAL
SL AMB POCT BLOOD,UA: ABNORMAL
SL AMB POCT CLARITY,UA: ABNORMAL
SL AMB POCT COLOR,UA: ABNORMAL
SL AMB POCT KETONES,UA: ABNORMAL
SL AMB POCT NITRITE,UA: ABNORMAL
SL AMB POCT PH,UA: 6
SL AMB POCT SPECIFIC GRAVITY,UA: >=1.03
SL AMB POCT URINE PROTEIN: ABNORMAL
SL AMB POCT UROBILINOGEN: ABNORMAL

## 2021-01-09 PROCEDURE — 87086 URINE CULTURE/COLONY COUNT: CPT | Performed by: NURSE PRACTITIONER

## 2021-01-09 PROCEDURE — 99214 OFFICE O/P EST MOD 30 MIN: CPT | Performed by: NURSE PRACTITIONER

## 2021-01-09 PROCEDURE — 87077 CULTURE AEROBIC IDENTIFY: CPT | Performed by: NURSE PRACTITIONER

## 2021-01-09 PROCEDURE — 87186 SC STD MICRODIL/AGAR DIL: CPT | Performed by: NURSE PRACTITIONER

## 2021-01-09 PROCEDURE — 81002 URINALYSIS NONAUTO W/O SCOPE: CPT | Performed by: NURSE PRACTITIONER

## 2021-01-09 RX ORDER — VITAMIN B COMPLEX
1 CAPSULE ORAL DAILY
COMMUNITY

## 2021-01-09 RX ORDER — NITROFURANTOIN 25; 75 MG/1; MG/1
100 CAPSULE ORAL 2 TIMES DAILY
Qty: 14 CAPSULE | Refills: 0 | Status: SHIPPED | OUTPATIENT
Start: 2021-01-09 | End: 2021-01-16

## 2021-01-09 NOTE — ASSESSMENT & PLAN NOTE
Most likely physiologic murmur  BP slightly elevated possible this is cause  Follow up if still present consider echo

## 2021-01-09 NOTE — PROGRESS NOTES
Assessment and Plan:    Problem List Items Addressed This Visit        Cardiovascular and Mediastinum    Essential hypertension     Patient is on norvasc 2  5 mg her bp is slightly elevated today  Other    Murmur, heart     Most likely physiologic murmur  BP slightly elevated possible this is cause  Follow up if still present consider echo  Dysuria - Primary    Relevant Medications    nitrofurantoin (MACROBID) 100 mg capsule                 Diagnoses and all orders for this visit:    Acute cystitis without hematuria  -     POCT urine dip  -     Urine culture  -     nitrofurantoin (MACROBID) 100 mg capsule; Take 1 capsule (100 mg total) by mouth 2 (two) times a day for 7 days    Essential hypertension    Murmur, heart    Other orders  -     b complex vitamins capsule; Take 1 capsule by mouth daily              Subjective:      Patient ID: Yudy Webber is a 70 y o  female  CC:    Chief Complaint   Patient presents with    Possible UTI     urine frequency, painful urination, burning        HPI:    Urinary Tract Infection   This is a new problem  Episode onset: 3 day  The problem occurs every urination  The problem has been gradually worsening  The quality of the pain is described as burning  There has been no fever  Associated symptoms include frequency  Pertinent negatives include no chills, discharge, flank pain, hematuria, hesitancy, nausea, possible pregnancy, sweats, urgency or vomiting  She has tried increased fluids for the symptoms  Her past medical history is significant for recurrent UTIs  The following portions of the patient's history were reviewed and updated as appropriate: allergies, current medications, past family history, past medical history, past social history, past surgical history and problem list       Review of Systems   Constitutional: Negative for activity change and chills  Respiratory: Negative  Negative for chest tightness and shortness of breath  Cardiovascular: Negative  Negative for chest pain, palpitations and leg swelling  Gastrointestinal: Positive for diarrhea  Negative for nausea and vomiting  Genitourinary: Positive for dysuria and frequency  Negative for flank pain, hematuria, hesitancy and urgency  Musculoskeletal: Positive for back pain  Neurological: Negative for dizziness, light-headedness and headaches  Data to review:       Objective:    Vitals:    01/09/21 1032   BP: 148/64   BP Location: Left arm   Patient Position: Sitting   Cuff Size: Adult   Pulse: 88   Resp: 16   Temp: 97 6 °F (36 4 °C)   TempSrc: Tympanic   Weight: 59 9 kg (132 lb)   Height: 5' 4" (1 626 m)        Physical Exam  Vitals signs and nursing note reviewed  Constitutional:       General: She is not in acute distress  Appearance: Normal appearance  She is not ill-appearing or diaphoretic  HENT:      Head: Normocephalic and atraumatic  Right Ear: External ear normal       Left Ear: External ear normal    Eyes:      Extraocular Movements: Extraocular movements intact  Conjunctiva/sclera: Conjunctivae normal    Cardiovascular:      Rate and Rhythm: Normal rate and regular rhythm  Heart sounds: S1 normal and S2 normal  Murmur present  Pulmonary:      Effort: Pulmonary effort is normal       Breath sounds: Normal breath sounds  Abdominal:      General: Abdomen is flat  Bowel sounds are normal       Tenderness: There is no abdominal tenderness  Musculoskeletal:      Right lower leg: No edema  Left lower leg: No edema  Skin:     Coloration: Skin is not pale  Neurological:      Mental Status: She is alert and oriented to person, place, and time  Psychiatric:         Mood and Affect: Mood normal          Behavior: Behavior normal          Thought Content:  Thought content normal          Judgment: Judgment normal

## 2021-01-11 LAB — BACTERIA UR CULT: ABNORMAL

## 2021-03-03 DIAGNOSIS — I10 HYPERTENSION, UNSPECIFIED TYPE: ICD-10-CM

## 2021-03-03 RX ORDER — AMLODIPINE BESYLATE 2.5 MG/1
TABLET ORAL
Qty: 90 TABLET | Refills: 0 | Status: SHIPPED | OUTPATIENT
Start: 2021-03-03 | End: 2021-03-17 | Stop reason: SDUPTHER

## 2021-03-10 ENCOUNTER — LAB (OUTPATIENT)
Dept: LAB | Facility: CLINIC | Age: 76
End: 2021-03-10
Payer: MEDICARE

## 2021-03-10 DIAGNOSIS — I10 ESSENTIAL HYPERTENSION: ICD-10-CM

## 2021-03-10 DIAGNOSIS — E78.00 HYPERCHOLESTEROLEMIA: ICD-10-CM

## 2021-03-10 LAB
ALBUMIN SERPL BCP-MCNC: 3.7 G/DL (ref 3.5–5)
ALP SERPL-CCNC: 87 U/L (ref 46–116)
ALT SERPL W P-5'-P-CCNC: 35 U/L (ref 12–78)
ANION GAP SERPL CALCULATED.3IONS-SCNC: 4 MMOL/L (ref 4–13)
AST SERPL W P-5'-P-CCNC: 14 U/L (ref 5–45)
BASOPHILS # BLD AUTO: 0.05 THOUSANDS/ΜL (ref 0–0.1)
BASOPHILS NFR BLD AUTO: 1 % (ref 0–1)
BILIRUB SERPL-MCNC: 0.51 MG/DL (ref 0.2–1)
BUN SERPL-MCNC: 21 MG/DL (ref 5–25)
CALCIUM SERPL-MCNC: 9.4 MG/DL (ref 8.3–10.1)
CHLORIDE SERPL-SCNC: 107 MMOL/L (ref 100–108)
CHOLEST SERPL-MCNC: 198 MG/DL (ref 50–200)
CO2 SERPL-SCNC: 28 MMOL/L (ref 21–32)
CREAT SERPL-MCNC: 0.79 MG/DL (ref 0.6–1.3)
EOSINOPHIL # BLD AUTO: 0.12 THOUSAND/ΜL (ref 0–0.61)
EOSINOPHIL NFR BLD AUTO: 2 % (ref 0–6)
ERYTHROCYTE [DISTWIDTH] IN BLOOD BY AUTOMATED COUNT: 13.5 % (ref 11.6–15.1)
GFR SERPL CREATININE-BSD FRML MDRD: 74 ML/MIN/1.73SQ M
GLUCOSE P FAST SERPL-MCNC: 88 MG/DL (ref 65–99)
HCT VFR BLD AUTO: 43.2 % (ref 34.8–46.1)
HDLC SERPL-MCNC: 51 MG/DL
HGB BLD-MCNC: 14.1 G/DL (ref 11.5–15.4)
IMM GRANULOCYTES # BLD AUTO: 0.01 THOUSAND/UL (ref 0–0.2)
IMM GRANULOCYTES NFR BLD AUTO: 0 % (ref 0–2)
LDLC SERPL CALC-MCNC: 134 MG/DL (ref 0–100)
LYMPHOCYTES # BLD AUTO: 2.88 THOUSANDS/ΜL (ref 0.6–4.47)
LYMPHOCYTES NFR BLD AUTO: 45 % (ref 14–44)
MCH RBC QN AUTO: 30.1 PG (ref 26.8–34.3)
MCHC RBC AUTO-ENTMCNC: 32.6 G/DL (ref 31.4–37.4)
MCV RBC AUTO: 92 FL (ref 82–98)
MONOCYTES # BLD AUTO: 0.48 THOUSAND/ΜL (ref 0.17–1.22)
MONOCYTES NFR BLD AUTO: 8 % (ref 4–12)
NEUTROPHILS # BLD AUTO: 2.9 THOUSANDS/ΜL (ref 1.85–7.62)
NEUTS SEG NFR BLD AUTO: 44 % (ref 43–75)
NONHDLC SERPL-MCNC: 147 MG/DL
NRBC BLD AUTO-RTO: 0 /100 WBCS
PLATELET # BLD AUTO: 267 THOUSANDS/UL (ref 149–390)
PMV BLD AUTO: 9.6 FL (ref 8.9–12.7)
POTASSIUM SERPL-SCNC: 4.3 MMOL/L (ref 3.5–5.3)
PROT SERPL-MCNC: 7.4 G/DL (ref 6.4–8.2)
RBC # BLD AUTO: 4.68 MILLION/UL (ref 3.81–5.12)
SODIUM SERPL-SCNC: 139 MMOL/L (ref 136–145)
TRIGL SERPL-MCNC: 65 MG/DL
WBC # BLD AUTO: 6.44 THOUSAND/UL (ref 4.31–10.16)

## 2021-03-10 PROCEDURE — 80061 LIPID PANEL: CPT

## 2021-03-10 PROCEDURE — 36415 COLL VENOUS BLD VENIPUNCTURE: CPT

## 2021-03-10 PROCEDURE — 80053 COMPREHEN METABOLIC PANEL: CPT

## 2021-03-10 PROCEDURE — 85025 COMPLETE CBC W/AUTO DIFF WBC: CPT

## 2021-03-17 ENCOUNTER — APPOINTMENT (OUTPATIENT)
Dept: LAB | Facility: CLINIC | Age: 76
End: 2021-03-17
Payer: MEDICARE

## 2021-03-17 ENCOUNTER — OFFICE VISIT (OUTPATIENT)
Dept: FAMILY MEDICINE CLINIC | Facility: CLINIC | Age: 76
End: 2021-03-17
Payer: MEDICARE

## 2021-03-17 VITALS
SYSTOLIC BLOOD PRESSURE: 126 MMHG | DIASTOLIC BLOOD PRESSURE: 68 MMHG | BODY MASS INDEX: 22.71 KG/M2 | HEART RATE: 76 BPM | RESPIRATION RATE: 16 BRPM | HEIGHT: 64 IN | TEMPERATURE: 98 F | WEIGHT: 133 LBS

## 2021-03-17 DIAGNOSIS — M81.0 AGE-RELATED OSTEOPOROSIS WITHOUT CURRENT PATHOLOGICAL FRACTURE: ICD-10-CM

## 2021-03-17 DIAGNOSIS — Z11.59 NEED FOR HEPATITIS C SCREENING TEST: ICD-10-CM

## 2021-03-17 DIAGNOSIS — R01.1 MURMUR, HEART: ICD-10-CM

## 2021-03-17 DIAGNOSIS — M79.10 MYALGIA: ICD-10-CM

## 2021-03-17 DIAGNOSIS — L65.9 HAIR LOSS: ICD-10-CM

## 2021-03-17 DIAGNOSIS — F41.1 GENERALIZED ANXIETY DISORDER: ICD-10-CM

## 2021-03-17 DIAGNOSIS — E78.00 HYPERCHOLESTEROLEMIA: ICD-10-CM

## 2021-03-17 DIAGNOSIS — I10 HYPERTENSION, UNSPECIFIED TYPE: ICD-10-CM

## 2021-03-17 DIAGNOSIS — I10 ESSENTIAL HYPERTENSION: ICD-10-CM

## 2021-03-17 DIAGNOSIS — M79.18 MYALGIA, OTHER SITE: ICD-10-CM

## 2021-03-17 DIAGNOSIS — K21.9 GERD WITHOUT ESOPHAGITIS: ICD-10-CM

## 2021-03-17 DIAGNOSIS — Z00.00 MEDICARE ANNUAL WELLNESS VISIT, SUBSEQUENT: Primary | ICD-10-CM

## 2021-03-17 DIAGNOSIS — M81.0 OSTEOPOROSIS WITHOUT CURRENT PATHOLOGICAL FRACTURE, UNSPECIFIED OSTEOPOROSIS TYPE: ICD-10-CM

## 2021-03-17 DIAGNOSIS — R23.2 HOT FLASHES: ICD-10-CM

## 2021-03-17 DIAGNOSIS — H61.23 BILATERAL IMPACTED CERUMEN: ICD-10-CM

## 2021-03-17 DIAGNOSIS — C43.9 MALIGNANT MELANOMA OF SKIN (HCC): ICD-10-CM

## 2021-03-17 LAB
PTH-INTACT SERPL-MCNC: 46.4 PG/ML (ref 18.4–80.1)
TSH SERPL DL<=0.05 MIU/L-ACNC: 1.55 UIU/ML (ref 0.36–3.74)

## 2021-03-17 PROCEDURE — 86800 THYROGLOBULIN ANTIBODY: CPT | Performed by: PHYSICIAN ASSISTANT

## 2021-03-17 PROCEDURE — 99214 OFFICE O/P EST MOD 30 MIN: CPT | Performed by: PHYSICIAN ASSISTANT

## 2021-03-17 PROCEDURE — G0439 PPPS, SUBSEQ VISIT: HCPCS | Performed by: PHYSICIAN ASSISTANT

## 2021-03-17 PROCEDURE — 84443 ASSAY THYROID STIM HORMONE: CPT

## 2021-03-17 PROCEDURE — 36415 COLL VENOUS BLD VENIPUNCTURE: CPT | Performed by: PHYSICIAN ASSISTANT

## 2021-03-17 PROCEDURE — 83970 ASSAY OF PARATHORMONE: CPT | Performed by: PHYSICIAN ASSISTANT

## 2021-03-17 PROCEDURE — 86376 MICROSOMAL ANTIBODY EACH: CPT | Performed by: PHYSICIAN ASSISTANT

## 2021-03-17 PROCEDURE — 1123F ACP DISCUSS/DSCN MKR DOCD: CPT | Performed by: PHYSICIAN ASSISTANT

## 2021-03-17 RX ORDER — AMLODIPINE BESYLATE 2.5 MG/1
2.5 TABLET ORAL DAILY
Qty: 90 TABLET | Refills: 3 | Status: SHIPPED | OUTPATIENT
Start: 2021-03-17 | End: 2021-05-31

## 2021-03-17 NOTE — PROGRESS NOTES
Assessment and Plan:    Problem List Items Addressed This Visit        Digestive    GERD without esophagitis       Stable with daily PPI since use of antibiotic for UTI  Continue probiotic  Cardiovascular and Mediastinum    Essential hypertension      Stable continue current medication  Relevant Medications    amLODIPine (NORVASC) 2 5 mg tablet    Other Relevant Orders    CBC and differential       Nervous and Auditory    Bilateral impacted cerumen     Us debrox and then flush ears  RTO if unable to do herself  Musculoskeletal and Integument    Malignant melanoma of skin ([facility] [address] )       Continue with Dermatology follow-up which is now yearly  Age-related osteoporosis without current pathological fracture    RESOLVED: Osteoporosis       Other    Generalized anxiety disorder    Hypercholesterolemia       Patient is on no medication for her cholesterol and her LDL is 134 at this time  This is unusual for pt due to covid and not being able to go to the gym  Recheck in the future  Relevant Orders    Lipid panel    Comprehensive metabolic panel    Murmur, heart     New to me today  Check ECHO  Relevant Orders    Echo complete with contrast if indicated    Medicare annual wellness visit, subsequent - Primary       Patient up-to-date with blood work and 1st good vaccination  DEXA scan is due as well as the new shingles vaccination which she could get the pharmacy at least 2 weeks after her last go vaccination  Blue Packet 5 wishes given  Hair loss     Pt also having hot flashes  Check TSH            Relevant Orders    TSH, 3rd generation with Free T4 reflex    Thyroid Antibodies Panel    PTH, intact      Other Visit Diagnoses     Need for hepatitis C screening test        Relevant Orders    Hepatitis C Antibody ([facility])    Myalgia        Relevant Orders    Vitamin D 25 hydroxy    TSH, 3rd generation with Free T4 reflex    Thyroid Antibodies Panel PTH, intact    Myalgia, other site         Relevant Orders    Vitamin D 25 hydroxy    Hot flashes        Relevant Orders    TSH, 3rd generation with Free T4 reflex    Thyroid Antibodies Panel    PTH, intact    Hypertension, unspecified type        Relevant Medications    amLODIPine (NORVASC) 2 5 mg tablet                 Diagnoses and all orders for this visit:    Medicare annual wellness visit, subsequent    Hypercholesterolemia  -     Lipid panel; Future  -     Comprehensive metabolic panel; Future    Generalized anxiety disorder    Osteoporosis without current pathological fracture, unspecified osteoporosis type    Age-related osteoporosis without current pathological fracture    Malignant melanoma of skin (HCC)    Essential hypertension  -     CBC and differential; Future    GERD without esophagitis    Need for hepatitis C screening test  -     Hepatitis C Antibody ([facility]); Future    Myalgia  -     Vitamin D 25 hydroxy; Future  -     TSH, 3rd generation with Free T4 reflex; Future  -     Thyroid Antibodies Panel  -     PTH, intact    Hair loss  -     TSH, 3rd generation with Free T4 reflex; Future  -     Thyroid Antibodies Panel  -     PTH, intact    Myalgia, other site   -     Vitamin D 25 hydroxy; Future    Hot flashes  -     TSH, 3rd generation with Free T4 reflex; Future  -     Thyroid Antibodies Panel  -     PTH, intact    Murmur, heart  -     Echo complete with contrast if indicated; Future    Bilateral impacted cerumen    Hypertension, unspecified type  -     amLODIPine (NORVASC) 2 5 mg tablet; Take 1 tablet (2 5 mg total) by mouth daily              Subjective:      Patient ID: Yudy Webber is a 78 y o  female  CC:    Chief Complaint   Patient presents with   [facility] Visit     Patient present today for her Medicare Wellness visit         HPI:      Yudy Webber is here for chronic conditions f/u including the diagnosis of Medicare annual wellness visit, subsequent (primary encounter diagnosis)  Hypercholesterolemia  Generalized anxiety disorder  Osteoporosis without current pathological fracture, unspecified osteoporosis type  Age-related osteoporosis without current pathological fracture  Malignant melanoma of skin (hcc)  Essential hypertension  Gerd without esophagitis  Need for hepatitis c screening test   Pt  states they are taking all medications as directed without complaints or side effects   Pt  had labs done prior to today's visit which included Recent Results (from the past 672 hour(s))  -Comprehensive metabolic panel  Collection Time: 03/10/21  7:25 AM       Result                      Value             Ref Range           Sodium                      139               136 - 145 mm*       Potassium                   4 3               3 5 - 5 3 mm*       Chloride                    107               100 - 108 mm*       CO2                         28                21 - 32 mmol*       ANION GAP                   4                 4 - 13 mmol/L       BUN                         21                5 - 25 mg/dL        Creatinine                  0 79              0 60 - 1 30 *       Glucose, Fasting            88                65 - 99 mg/dL       Calcium                     9 4               8 3 - 10 1 m*       AST                         14                5 - 45 U/L          ALT                         35                12 - 78 U/L         Alkaline Phosphatase        87                46 - 116 U/L        Total Protein               7 4               6 4 - 8 2 g/*       Albumin                     3 7               3 5 - 5 0 g/*       Total Bilirubin             0 51              0 20 - 1 00 *       eGFR                        74                ml/min/1 73s*  -Lipid panel  Collection Time: 03/10/21  7:25 AM       Result                      Value             Ref Range           Cholesterol                 198               50 - 200 mg/*       Triglycerides 65                <=150 mg/dL         HDL, Direct                 51                >=40 mg/dL          LDL Calculated              134 (H)           0 - 100 mg/dL       Non-HDL-Chol (CHOL-HDL)     147               mg/dl          -CBC and differential  Collection Time: 03/10/21  7:25 AM       Result                      Value             Ref Range           WBC                         6 44              4 31 - 10 16*       RBC                         4 68              3 81 - 5 12 *       Hemoglobin                  14 1              11 5 - 15 4 *       Hematocrit                  43 2              34 8 - 46 1 %       MCV                         92                82 - 98 fL          MCH                         30 1              26 8 - 34 3 *       MCHC                        32 6              31 4 - 37 4 *       RDW                         13 5              11 6 - 15 1 %       MPV                         9 6               8 9 - 12 7 fL       Platelets                   267               149 - 390 Th*       nRBC                        0                 /100 WBCs           Neutrophils Relative        44                43 - 75 %           Immat GRANS %               0                 0 - 2 %             Lymphocytes Relative        45 (H)            14 - 44 %           Monocytes Relative          8                 4 - 12 %            Eosinophils Relative        2                 0 - 6 %             Basophils Relative          1                 0 - 1 %             Neutrophils Absolute        2 90              1 85 - 7 62 *       Immature Grans Absolute     0 01              0 00 - 0 20 *       Lymphocytes Absolute        2 88              0 60 - 4 47 *       Monocytes Absolute          0 48              0 17 - 1 22 *       Eosinophils Absolute        0 12              0 00 - 0 61 *       Basophils Absolute          0 05              0 00 - 0 10 *        The following portions of the patient's history were reviewed and updated as appropriate: allergies, current medications, past family history, past medical history, past social history, past surgical history and problem list       Review of Systems   Constitutional: Negative  HENT: Negative  Eyes: Negative  Respiratory: Negative  Cardiovascular: Negative  Gastrointestinal: Negative  Endocrine: Negative  Genitourinary: Negative  Musculoskeletal: Negative  Skin: Negative  Allergic/Immunologic: Negative  Neurological: Negative  Hematological: Negative  Psychiatric/Behavioral: Negative  Data to review:       Objective:    Vitals:    03/17/21 0937   BP: 126/68   BP Location: Right arm   Patient Position: Sitting   Cuff Size: Large   Pulse: 76   Resp: 16   Temp: 98 °F (36 7 °C)   TempSrc: Tympanic   Weight: 60 3 kg (133 lb)   Height: 5' 4" (1 626 m)        Physical Exam  Vitals signs and nursing note reviewed  Constitutional:       Appearance: Normal appearance  She is well-developed  HENT:      Head: Normocephalic and atraumatic  Eyes:      General: Lids are normal       Conjunctiva/sclera: Conjunctivae normal       Pupils: Pupils are equal, round, and reactive to light  Cardiovascular:      Rate and Rhythm: Normal rate and regular rhythm  Heart sounds: No murmur  Pulmonary:      Effort: Pulmonary effort is normal       Breath sounds: Normal breath sounds  Skin:     General: Skin is warm and dry  Neurological:      General: No focal deficit present  Mental Status: She is alert  Coordination: Coordination is intact  Psychiatric:         Mood and Affect: Mood normal          Behavior: Behavior normal  Behavior is cooperative  Thought Content:  Thought content normal          Judgment: Judgment normal

## 2021-03-17 NOTE — ASSESSMENT & PLAN NOTE
Patient up-to-date with blood work and 1st good vaccination  DEXA scan is due as well as the new shingles vaccination which she could get the pharmacy at least 2 weeks after her last go vaccination  Blue Packet 5 wishes given

## 2021-03-17 NOTE — PATIENT INSTRUCTIONS
Problem List Items Addressed This Visit        Digestive    GERD without esophagitis       Stable with daily PPI since use of antibiotic for UTI  Continue probiotic  Cardiovascular and Mediastinum    Essential hypertension      Stable continue current medication  Relevant Medications    amLODIPine (NORVASC) 2 5 mg tablet    Other Relevant Orders    CBC and differential       Nervous and Auditory    Bilateral impacted cerumen     Us debrox and then flush ears  RTO if unable to do herself  Musculoskeletal and Integument    Malignant melanoma of skin ([address] )       Continue with Dermatology follow-up which is now yearly  Age-related osteoporosis without current pathological fracture    RESOLVED: Osteoporosis       Other    Generalized anxiety disorder    Hypercholesterolemia       Patient is on no medication for her cholesterol and her LDL is 134 at this time  This is unusual for pt due to covid and not being able to go to the gym  Recheck in the future  Relevant Orders    Lipid panel    Comprehensive metabolic panel    Murmur, heart     New to me today  Check ECHO  Relevant Orders    Echo complete with contrast if indicated    Medicare annual wellness visit, subsequent - Primary       Patient up-to-date with blood work and 1st good vaccination  DEXA scan is due as well as the new shingles vaccination which she could get the pharmacy at least 2 weeks after her last go vaccination  Blue Packet 5 wishes given  Hair loss     Pt also having hot flashes  Check TSH            Relevant Orders    TSH, 3rd generation with Free T4 reflex    Thyroid Antibodies Panel    PTH, intact      Other Visit Diagnoses     Need for hepatitis C screening test        Relevant Orders    Hepatitis C Antibody ([facility])    Myalgia        Relevant Orders    Vitamin D 25 hydroxy    TSH, 3rd generation with Free T4 reflex    Thyroid Antibodies Panel    PTH, intact    Myalgia, other site         Relevant Orders    Vitamin D 25 hydroxy    Hot flashes        Relevant Orders    TSH, 3rd generation with Free T4 reflex    Thyroid Antibodies Panel    PTH, intact    Hypertension, unspecified type        Relevant Medications    amLODIPine (NORVASC) 2 5 mg tablet

## 2021-03-17 NOTE — ASSESSMENT & PLAN NOTE
Patient is on no medication for her cholesterol and her LDL is 134 at this time  This is unusual for pt due to covid and not being able to go to the gym  Recheck in the future

## 2021-03-17 NOTE — PROGRESS NOTES
Assessment and Plan:     Problem List Items Addressed This Visit        Digestive    GERD without esophagitis       Stable with daily PPI  Cardiovascular and Mediastinum    Essential hypertension      Stable continue current medication  Musculoskeletal and Integument    Malignant melanoma of skin ([facility] [address] )       Continue with Dermatology follow-up  Age-related osteoporosis without current pathological fracture    RESOLVED: Osteoporosis       Other    Generalized anxiety disorder    Hypercholesterolemia       Patient is on no medication for her cholesterol and her LDL is 134 at this time  Patient continues to decline medication therapy  Check yearly  Medicare annual wellness visit, subsequent - Primary       Patient up-to-date with blood work and 1st good vaccination  DEXA scan is due as well as the new shingles vaccination which she could get the pharmacy at least 2 weeks after her last go vaccination  Blue Packet 5 wishes given  Other Visit Diagnoses     Need for hepatitis C screening test               Preventive health issues were discussed with patient, and age appropriate screening tests were ordered as noted in patient's After Visit Summary  Personalized health advice and appropriate referrals for health education or preventive services given if needed, as noted in patient's After Visit Summary       History of Present Illness:     Patient presents for Medicare Annual Wellness visit    Patient Care Team:  Carlie Rausch PA-C as PCP - General (Family Medicine)  CLEMENT Ramirez PA-C Marene Meuse, PA-C     Problem List:     Patient Active Problem List   Diagnosis    Generalized anxiety disorder    Cardiac arrhythmia    Diverticulosis    GERD without esophagitis    Hiatal hernia    Hypercholesterolemia    Essential hypertension    Irritable bowel syndrome    Malignant melanoma of skin ([facility] [address] )    Age-related osteoporosis without current pathological fracture    Squamous cell carcinoma of skin    Splenic artery aneurysm (HCC)    Other shoulder lesions, unspecified shoulder    Other specified disorders of rotator cuff syndrome of shoulder and allied disorders    Healthcare maintenance    Murmur, heart    Dysuria    Medicare annual wellness visit, subsequent      Past Medical and Surgical History:     History reviewed  No pertinent past medical history  History reviewed  No pertinent surgical history     Family History:     Family History   Problem Relation Age of Onset    Cervical cancer Mother     Prostate cancer Father     Osteoporosis Sister       Social History:        Social History     Socioeconomic History    Marital status: /Civil Union     Spouse name: None    Number of children: None    Years of education: None    Highest education level: None   Occupational History    Occupation: retired    Social Needs    Financial resource strain: None    Food insecurity     Worry: None     Inability: None    Transportation needs     Medical: None     Non-medical: None   Tobacco Use    Smoking status: Never Smoker    Smokeless tobacco: Never Used    Tobacco comment: No secondhand smoke exposure    Substance and Sexual Activity    Alcohol use: No    Drug use: None    Sexual activity: None   Lifestyle    Physical activity     Days per week: None     Minutes per session: None    Stress: None   Relationships    Social connections     Talks on phone: None     Gets together: None     Attends [employer] service: None     Active member of club or organization: None     Attends meetings of clubs or organizations: None     Relationship status: None    Intimate partner violence     Fear of current or ex partner: None     Emotionally abused: None     Physically abused: None     Forced sexual activity: None   Other Topics Concern    None   Social History Narrative    None      Medications and Allergies:     Current Outpatient Medications   Medication Sig Dispense Refill    amLODIPine (NORVASC) 2 5 mg tablet TAKE 1 TABLET BY MOUTH DAILY 90 tablet 0    b complex vitamins capsule Take 1 capsule by mouth daily      Cholecalciferol (VITAMIN D3) 1000 units CAPS Take 2 capsules by mouth daily      omeprazole (PriLOSEC) 20 mg delayed release capsule Take by mouth      ALPRAZolam (XANAX) 0 25 mg tablet Take 1 tablet by mouth daily as needed       No current facility-administered medications for this visit  Allergies   Allergen Reactions    Penicillins      Childhood      Immunizations:     Immunization History   Administered Date(s) Administered    Influenza Split High Dose Preservative Free IM 10/06/2014, 10/01/2015, 10/05/2016, 10/12/2017, 10/18/2019    Influenza, high dose seasonal 0 7 mL 09/27/2018, 10/08/2020    Influenza, seasonal, injectable 1945, 11/05/2012, 10/31/2013    Pneumococcal Conjugate 13-Valent 09/27/2018    Pneumococcal Polysaccharide PPV23 03/04/2020    SARS-CoV-2 / COVID-19 mRNA IM (Pfizer-BioNTech) 03/11/2021    Zoster 11/23/2014    Zoster Vaccine Recombinant 11/23/2014      Health Maintenance:         Topic Date Due    Hepatitis C Screening  1945    MAMMOGRAM  07/20/2021    Colorectal Cancer Screening  05/14/2024         Topic Date Due    DTaP,Tdap,and Td Vaccines (1 - Tdap) 02/07/1968      Medicare Health Risk Assessment:     /68 (BP Location: Right arm, Patient Position: Sitting, Cuff Size: Large)   Pulse 76   Temp 98 °F (36 7 °C) (Tympanic)   Resp 16   Ht 5' 4" (1 626 m)   Wt 60 3 kg (133 lb)   BMI 22 83 kg/m²      Samra Russ is here for her Subsequent Wellness visit  Health Risk Assessment:   Patient rates overall health as very good  Patient feels that their physical health rating is same  Patient is very satisfied with their life  Eyesight was rated as slightly worse  Hearing was rated as slightly worse   Patient feels that their emotional and mental health rating is same  Patients states they are sometimes angry  Patient states they are never, rarely unusually tired/fatigued  Pain experienced in the last 7 days has been none  Patient states that she has experienced no weight loss or gain in last 6 months  Fall Risk Screening: In the past year, patient has experienced: no history of falling in past year      Urinary Incontinence Screening:   Patient has not leaked urine accidently in the last six months  Home Safety:  Patient does not have trouble with stairs inside or outside of their home  Patient has no working smoke alarms and has no working carbon monoxide detector  Home safety hazards include: none  Nutrition:   Current diet is Low Carb and Low Saturated Fat  Medications:   Patient is currently taking over-the-counter supplements  OTC medications include: see medication list  Patient is able to manage medications  Activities of Daily Living (ADLs)/Instrumental Activities of Daily Living (IADLs):   Walk and transfer into and out of bed and chair?: Yes  Dress and groom yourself?: Yes    Bathe or shower yourself?: Yes    Feed yourself?  Yes  Do your laundry/housekeeping?: Yes  Manage your money, pay your bills and track your expenses?: Yes  Make your own meals?: Yes    Do your own shopping?: Yes    Previous Hospitalizations:   Any hospitalizations or ED visits within the last 12 months?: No      Advance Care Planning:     Five wishes given: Yes      Cognitive Screening:   Provider or family/friend/caregiver concerned regarding cognition?: No    PREVENTIVE SCREENINGS      Cardiovascular Screening:    General: Screening Not Indicated and History Lipid Disorder      Diabetes Screening:     General: Screening Current      Colorectal Cancer Screening:     General: Screening Current      Breast Cancer Screening:     General: Screening Current      Cervical Cancer Screening:    General: Screening Not Indicated      Osteoporosis Screening:    General: Screening Not Indicated and History Osteoporosis      Abdominal Aortic Aneurysm (AAA) Screening:        General: Screening Not Indicated      Lung Cancer Screening:     General: Screening Not Indicated      Hepatitis C Screening:      Hep C Screening Accepted: Yes      Screening, Brief Intervention, and Referral to Treatment (SBIRT)    Screening    Typical number of drinks in a week: 0    Single Item Drug Screening:  How often have you used an illegal drug (including marijuana) or a prescription medication for non-medical reasons in the past year? never    Single Item Drug Screen Score: 0  Interpretation: Negative screen for possible drug use disorder      Juancarlos Her PA-C

## 2021-03-18 LAB
THYROGLOB AB SERPL-ACNC: <1 IU/ML (ref 0–0.9)
THYROPEROXIDASE AB SERPL-ACNC: 25 IU/ML (ref 0–34)

## 2021-03-30 ENCOUNTER — APPOINTMENT (OUTPATIENT)
Dept: RADIOLOGY | Facility: MEDICAL CENTER | Age: 76
End: 2021-03-30
Payer: MEDICARE

## 2021-03-30 ENCOUNTER — TELEMEDICINE (OUTPATIENT)
Dept: FAMILY MEDICINE CLINIC | Facility: CLINIC | Age: 76
End: 2021-03-30
Payer: MEDICARE

## 2021-03-30 VITALS
HEART RATE: 85 BPM | BODY MASS INDEX: 22.13 KG/M2 | HEIGHT: 64 IN | SYSTOLIC BLOOD PRESSURE: 156 MMHG | DIASTOLIC BLOOD PRESSURE: 77 MMHG | WEIGHT: 129.6 LBS | TEMPERATURE: 98.5 F

## 2021-03-30 DIAGNOSIS — R06.02 SOBOE (SHORTNESS OF BREATH ON EXERTION): Primary | ICD-10-CM

## 2021-03-30 DIAGNOSIS — R06.02 SOBOE (SHORTNESS OF BREATH ON EXERTION): ICD-10-CM

## 2021-03-30 PROCEDURE — 71046 X-RAY EXAM CHEST 2 VIEWS: CPT

## 2021-03-30 PROCEDURE — 99442 PR PHYS/QHP TELEPHONE EVALUATION 11-20 MIN: CPT | Performed by: PHYSICIAN ASSISTANT

## 2021-03-30 NOTE — ASSESSMENT & PLAN NOTE
Patient has been noticing this for the past 4-5 days  It is accompanied by a discomfort in the upper back when she has her bra on and resolves with her bra off and improves when she is standing versus sitting  Patient restart her proton pump inhibitor on a daily basis instead of every other day  This is a virtual visit today and I am sending her for a chest x-ray and I am seeing her in the office to review chest x-ray results perform EKG and do physical exam   Patient is to go to the emergency room if symptoms change in the meantime

## 2021-03-30 NOTE — PROGRESS NOTES
Virtual Brief Visit    Assessment/Plan:    Problem List Items Addressed This Visit        Other    SOBOE (shortness of breath on exertion) - Primary     Patient has been noticing this for the past 4-5 days  It is accompanied by a discomfort in the upper back when she has her bra on and resolves with her bra off and improves when she is standing versus sitting  Patient restart her proton pump inhibitor on a daily basis instead of every other day  This is a virtual visit today and I am sending her for a chest x-ray and I am seeing her in the office to review chest x-ray results perform EKG and do physical exam   Patient is to go to the emergency room if symptoms change in the meantime  Relevant Orders    XR chest pa & lateral                Reason for visit is   Chief Complaint   Patient presents with    Shortness of Breath     SOB with exertion  Pain across top of back  Symptoms started Friday  Pt notes symptoms resolve after removing her bra in the evening   -  [facility]    Virtual Brief Visit        Encounter provider Prieto Spence PA-C    Provider located at [address] [place] [phone]    Recent Visits  No visits were found meeting these conditions  Showing recent visits within past 7 days and meeting all other requirements     Today's Visits  Date Type Provider Dept   03/30/21 113 Lee Ann Payne PA-C [facility]   Showing today's visits and meeting all other requirements     Future Appointments  No visits were found meeting these conditions  Showing future appointments within next 150 days and meeting all other requirements        After connecting through telephone, the patient was identified by name and date of birth  Aletha Nicole was informed that this is a telemedicine visit and that the visit is being conducted through telephone  My office door was closed  No one else was in the room    She acknowledged consent and understanding of privacy and security of the platform  The patient has agreed to participate and understands she can discontinue the visit at any time  Patient is aware this is a billable service  Subjective    Yudy Webber is a 78 y o  female pt  Patient has a telephone call today because she was flagged for symptoms of hyper related to [place]  She states that Friday she started feeling winded when she was exerting herself an example is that she has a bilevel home and when she would go upper downstairs to do laundry she would feel a little bit short of breath not that this inhibited her or stop her from doing her chores such as laundry but she states that it is unusual for her  She states that she usually does a lot of her exercises and has not been doing them because she just does not feel right  She has this discomfort across her upper back that resolves when she takes her brought off at night she sleeps okay no shortness of breath she can get up in the middle the night go to the bathroom come back to bed and fall back asleep and she has no short of breath at that time when her brought is removed  She states that after eating breakfast after putting her bra back on she again has that tightness across her back where the bra goes and she feels a little winded  She thinks that it is very odd and she for the past 7 days has been taking her proton pump inhibitor daily as she was only taking every other day  She has no cough fever she states that she has the symptoms more so when sitting as opposed to standing  She had her 1st vaccine for COVID at [facility] [address] and has her 2nd 01/01/2030 on Thursday  History reviewed  No pertinent past medical history  History reviewed  No pertinent surgical history      Current Outpatient Medications   Medication Sig Dispense Refill    BIOTIN PO Take by mouth      ALPRAZolam (XANAX) 0 25 mg tablet Take 1 tablet by mouth daily as needed      amLODIPine (NORVASC) 2 5 mg tablet Take 1 tablet (2 5 mg total) by mouth daily 90 tablet 3    b complex vitamins capsule Take 1 capsule by mouth daily      Cholecalciferol (VITAMIN D3) 1000 units CAPS Take 2 capsules by mouth daily      omeprazole (PriLOSEC) 20 mg delayed release capsule Take by mouth       No current facility-administered medications for this visit  Allergies   Allergen Reactions    Macrobid [Nitrofurantoin] GI Intolerance    Penicillins      Childhood       Review of Systems   Constitutional: Negative  HENT: Negative  Eyes: Negative  Respiratory: Positive for shortness of breath  Cardiovascular: Negative  Gastrointestinal: Negative  Endocrine: Negative  Genitourinary: Negative  Musculoskeletal: Positive for back pain  Skin: Negative  Allergic/Immunologic: Negative  Neurological: Negative  Hematological: Negative  Psychiatric/Behavioral: Negative  Vitals:    03/30/21 1427   BP: 156/77   BP Location: Left arm   Pulse: 85   Temp: 98 5 °F (36 9 °C)   TempSrc: Oral   Weight: 58 8 kg (129 lb 9 6 oz)   Height: 5' 4" (1 626 m)         I spent 15 minutes directly with the patient during this visit    VIRTUAL VISIT DISCLAIMER    Nick Reyna acknowledges that she has consented to an online visit or consultation  She understands that the online visit is based solely on information provided by her, and that, in the absence of a face-to-face physical evaluation by the physician, the diagnosis she receives is both limited and provisional in terms of accuracy and completeness  This is not intended to replace a full medical face-to-face evaluation by the physician  Nick Reyna understands and accepts these terms

## 2021-03-30 NOTE — PATIENT INSTRUCTIONS
Problem List Items Addressed This Visit        Other    SOBOE (shortness of breath on exertion) - Primary     Patient has been noticing this for the past 4-5 days  It is accompanied by a discomfort in the upper back when she has her bra on and resolves with her bra off and improves when she is standing versus sitting  Patient restart her proton pump inhibitor on a daily basis instead of every other day  This is a virtual visit today and I am sending her for a chest x-ray and I am seeing her in the office to review chest x-ray results perform EKG and do physical exam   Patient is to go to the emergency room if symptoms change in the meantime           Relevant Orders    XR chest pa & lateral

## 2021-04-01 ENCOUNTER — OFFICE VISIT (OUTPATIENT)
Dept: FAMILY MEDICINE CLINIC | Facility: CLINIC | Age: 76
End: 2021-04-01
Payer: MEDICARE

## 2021-04-01 VITALS
BODY MASS INDEX: 22.36 KG/M2 | WEIGHT: 131 LBS | TEMPERATURE: 98.8 F | SYSTOLIC BLOOD PRESSURE: 160 MMHG | DIASTOLIC BLOOD PRESSURE: 68 MMHG | HEIGHT: 64 IN | OXYGEN SATURATION: 99 % | HEART RATE: 76 BPM

## 2021-04-01 DIAGNOSIS — R01.1 MURMUR, HEART: ICD-10-CM

## 2021-04-01 DIAGNOSIS — I10 ESSENTIAL HYPERTENSION: Primary | ICD-10-CM

## 2021-04-01 DIAGNOSIS — I72.8 SPLENIC ARTERY ANEURYSM (HCC): ICD-10-CM

## 2021-04-01 DIAGNOSIS — R06.02 SOBOE (SHORTNESS OF BREATH ON EXERTION): ICD-10-CM

## 2021-04-01 PROCEDURE — 99214 OFFICE O/P EST MOD 30 MIN: CPT | Performed by: PHYSICIAN ASSISTANT

## 2021-04-01 PROCEDURE — 93000 ELECTROCARDIOGRAM COMPLETE: CPT | Performed by: PHYSICIAN ASSISTANT

## 2021-04-01 RX ORDER — PHENOL 1.4 %
600 AEROSOL, SPRAY (ML) MUCOUS MEMBRANE 2 TIMES DAILY WITH MEALS
COMMUNITY

## 2021-04-01 NOTE — ASSESSMENT & PLAN NOTE
Resolving however further workup needed  EKG in office WNL  Need to check stress ECHO  CXR still pending

## 2021-04-01 NOTE — PROGRESS NOTES
Assessment and Plan:    Problem List Items Addressed This Visit        Cardiovascular and Mediastinum    Essential hypertension - Primary     Pt  is very upset and nervous today  BP is up likely due to this  Pt is being sent for a stress ECHO  Relevant Orders    POCT ECG (Completed)    Splenic artery aneurysm ([address] )     Pt again declining CT follow up  Other    Murmur, heart     Cancel echo scheduled as will due stress echo  SOBOE (shortness of breath on exertion)       Resolving however further workup needed  EKG in office WNL  Need to check stress ECHO  CXR still pending  Relevant Orders    POCT ECG (Completed)    Echo stress test w contrast if indicated                 Diagnoses and all orders for this visit:    Essential hypertension  -     POCT ECG    SOBOE (shortness of breath on exertion)  -     POCT ECG  -     Echo stress test w contrast if indicated; Future    Splenic artery aneurysm (HCC)    Murmur, heart    Other orders  -     calcium carbonate (Calcium 600) 600 MG tablet; Take 600 mg by mouth 2 (two) times a day with meals              Subjective:      Patient ID: Ham Julio is a 78 y o  female  CC:    Chief Complaint   Patient presents with    Shortness of Breath     SOB with exertion  Symptoms started last Friday  -  [facility]       HPI:     Patient here today to follow-up on telemedicine appointment where she was complaining of back pain burping and shortness of breath on exertion  She states that the back pain has pretty much gone away she still has a little bit of more burping than usual   Still very mild more winded with exertion than usual for her but overall better in general   No specific chest pain leg swelling nausea vomiting sweats    She did have schedule an echocardiogram from the last visit due to a new murmur this is set for September however I do believe we need to do a stress echo for further evaluation even know her EKG is normal today       The following portions of the patient's history were reviewed and updated as appropriate: allergies, current medications, past family history, past medical history, past social history, past surgical history and problem list       Review of Systems   Constitutional: Negative  HENT: Negative  Eyes: Negative  Respiratory: Negative  Cardiovascular: Negative  Gastrointestinal: Negative  Endocrine: Negative  Genitourinary: Negative  Musculoskeletal: Negative  Skin: Negative  Allergic/Immunologic: Negative  Neurological: Negative  Hematological: Negative  Psychiatric/Behavioral: Negative  Data to review:       Objective:    Vitals:    04/01/21 1134 04/01/21 1203   BP: 160/68    BP Location: Left arm    Patient Position: Sitting    Cuff Size: Standard    Pulse: 76    Temp: 98 8 °F (37 1 °C)    TempSrc: Oral    SpO2:  99%   Weight: 59 4 kg (131 lb)    Height: 5' 4" (1 626 m)         Physical Exam  Vitals signs and nursing note reviewed  Constitutional:       Appearance: Normal appearance  She is well-developed  HENT:      Head: Normocephalic and atraumatic  Eyes:      General: Lids are normal       Conjunctiva/sclera: Conjunctivae normal       Pupils: Pupils are equal, round, and reactive to light  Cardiovascular:      Rate and Rhythm: Normal rate and regular rhythm  Heart sounds: No murmur  Pulmonary:      Effort: Pulmonary effort is normal       Breath sounds: Normal breath sounds  Skin:     General: Skin is warm and dry  Neurological:      General: No focal deficit present  Mental Status: She is alert  Coordination: Coordination is intact  Psychiatric:         Mood and Affect: Mood normal          Behavior: Behavior normal  Behavior is cooperative  Thought Content:  Thought content normal          Judgment: Judgment normal

## 2021-04-01 NOTE — PATIENT INSTRUCTIONS
Problem List Items Addressed This Visit        Cardiovascular and Mediastinum    Essential hypertension - Primary    Relevant Orders    POCT ECG    Splenic artery aneurysm (HCC)       Other    SOBOE (shortness of breath on exertion)     EKG in office WNL  Need to check stress ECHO  CXR still pending           Relevant Orders    POCT ECG    Echo stress test w contrast if indicated

## 2021-04-02 NOTE — ASSESSMENT & PLAN NOTE
Pt  is very upset and nervous today  BP is up likely due to this  Pt is being sent for a stress ECHO

## 2021-04-03 DIAGNOSIS — R91.1 LUNG NODULE: Primary | ICD-10-CM

## 2021-04-03 NOTE — RESULT ENCOUNTER NOTE
Please let pt know her CT showed a 5 mm R upper lung finding that needs further specific imaging with CT  Order was placed

## 2021-04-09 ENCOUNTER — HOSPITAL ENCOUNTER (OUTPATIENT)
Dept: CT IMAGING | Facility: HOSPITAL | Age: 76
Discharge: HOME | End: 2021-04-09
Payer: MEDICARE

## 2021-04-09 DIAGNOSIS — R91.1 LUNG NODULE: ICD-10-CM

## 2021-04-09 PROCEDURE — G1004 CDSM NDSC: HCPCS

## 2021-04-09 PROCEDURE — 71250 CT THORAX DX C-: CPT

## 2021-04-15 NOTE — RESULT ENCOUNTER NOTE
Please let pt know that the CT did not find any abnormalities  What was visualized on the CXR in her lung is not present on CT imaging so no further work up is needed  Thank you

## 2021-04-23 ENCOUNTER — OFFICE VISIT (OUTPATIENT)
Dept: FAMILY MEDICINE CLINIC | Facility: CLINIC | Age: 76
End: 2021-04-23
Payer: MEDICARE

## 2021-04-23 VITALS
HEIGHT: 64 IN | SYSTOLIC BLOOD PRESSURE: 148 MMHG | BODY MASS INDEX: 22.2 KG/M2 | TEMPERATURE: 98.6 F | HEART RATE: 70 BPM | WEIGHT: 130 LBS | DIASTOLIC BLOOD PRESSURE: 76 MMHG

## 2021-04-23 DIAGNOSIS — H61.23 BILATERAL IMPACTED CERUMEN: ICD-10-CM

## 2021-04-23 DIAGNOSIS — H61.23 BILATERAL HEARING LOSS DUE TO CERUMEN IMPACTION: Primary | ICD-10-CM

## 2021-04-23 PROCEDURE — 69210 REMOVE IMPACTED EAR WAX UNI: CPT | Performed by: PHYSICIAN ASSISTANT

## 2021-04-23 NOTE — PROGRESS NOTES
Assessment and Plan:    Problem List Items Addressed This Visit        Nervous and Auditory    Bilateral impacted cerumen      Patient had been using Debrox and still with hearing loss secondary to impacted cerumen  Status post flushed today bilateral canals clear of cerumen  Right TM mildly erythematous but no need for antibiotic drops at this time  Return to office as needed  RESOLVED: Bilateral hearing loss due to cerumen impaction - Primary                 Diagnoses and all orders for this visit:    Bilateral hearing loss due to cerumen impaction    Bilateral impacted cerumen    Other orders  -     Vitamin Mixture (Glenna-C) 500-60 MG TABS; Take by mouth  -     Ear cerumen removal              Subjective:      Patient ID: Ann-Marie Ortega is a 78 y o  female  CC:    Chief Complaint   Patient presents with    Cerumen Impaction     Decreased hearing left ear  Pt states she has tried Debrox without relief   Shortness of Breath     Pt notes symptoms have not resolved since last OV  - lsh       HPI:      Patient has been having trouble hearing more so after using Debrox in each ear for impacted cerumen  She tried using bulb syringe for flushing at home and hearing seemed to get worse  She is having no ear pain at this time  She continues to have some mild shortness of breath with exertion and so far chest x-ray CT within normal limits and patient has stress test pending for next week  Ear cerumen removal    Date/Time: 4/23/2021 9:35 AM  Performed by: Janessa Wiley PA-C  Authorized by: Janessa Wiley PA-C   Universal Protocol:  Consent: Verbal consent obtained    Consent given by: patient  Patient understanding: patient states understanding of the procedure being performed  Patient identity confirmed: verbally with patient      Patient location:  Clinic  Procedure details:     Local anesthetic:  None    Location:  L ear and R ear    Procedure type: irrigation with instrumentation Approach:  Natural orifice  Post-procedure details:     Complication:  None    Hearing quality:  Improved    Patient tolerance of procedure: Tolerated well, no immediate complications        The following portions of the patient's history were reviewed and updated as appropriate: allergies, current medications, past family history, past medical history, past social history, past surgical history and problem list       Review of Systems   Constitutional: Negative  HENT: Positive for hearing loss  Eyes: Negative  Respiratory: Positive for shortness of breath  Cardiovascular: Negative  Gastrointestinal: Negative  Endocrine: Negative  Genitourinary: Negative  Musculoskeletal: Negative  Skin: Negative  Allergic/Immunologic: Negative  Neurological: Negative  Hematological: Negative  Psychiatric/Behavioral: Negative  Data to review:       Objective:    Vitals:    04/23/21 0903   BP: 148/76   BP Location: Left arm   Patient Position: Sitting   Cuff Size: Standard   Pulse: 70   Temp: 98 6 °F (37 °C)   TempSrc: Oral   Weight: 59 kg (130 lb)   Height: 5' 4" (1 626 m)        Physical Exam  Vitals signs and nursing note reviewed  Constitutional:       Appearance: Normal appearance  She is well-developed  HENT:      Head: Normocephalic and atraumatic  Right Ear: Decreased hearing noted  There is impacted cerumen  Tympanic membrane is erythematous  Left Ear: Decreased hearing noted  There is impacted cerumen  Ears:      Comments:   Status post flush both canals 100% open and free of wax  Left TM slightly erythematous  See procedure note  Eyes:      General: Lids are normal       Conjunctiva/sclera: Conjunctivae normal       Pupils: Pupils are equal, round, and reactive to light  Cardiovascular:      Rate and Rhythm: Normal rate and regular rhythm  Heart sounds: No murmur     Pulmonary:      Effort: Pulmonary effort is normal       Breath sounds: Normal breath sounds  Skin:     General: Skin is warm and dry  Neurological:      General: No focal deficit present  Mental Status: She is alert  Coordination: Coordination is intact  Psychiatric:         Mood and Affect: Mood normal          Behavior: Behavior normal  Behavior is cooperative  Thought Content:  Thought content normal          Judgment: Judgment normal

## 2021-04-23 NOTE — PATIENT INSTRUCTIONS
Problem List Items Addressed This Visit        Nervous and Auditory    Bilateral impacted cerumen      Patient had been using Debrox and still with hearing loss secondary to impacted cerumen  Status post flushed today bilateral canals clear of cerumen  Right TM mildly erythematous but no need for antibiotic drops at this time  Return to office as needed           Relevant Orders    Ear cerumen removal    RESOLVED: Bilateral hearing loss due to cerumen impaction - Primary    Relevant Orders    Ear cerumen removal

## 2021-04-23 NOTE — ASSESSMENT & PLAN NOTE
Patient had been using Debrox and still with hearing loss secondary to impacted cerumen  Status post flushed today bilateral canals clear of cerumen  Right TM mildly erythematous but no need for antibiotic drops at this time  Return to office as needed

## 2021-04-28 ENCOUNTER — HOSPITAL ENCOUNTER (OUTPATIENT)
Dept: NON INVASIVE DIAGNOSTICS | Facility: HOSPITAL | Age: 76
Discharge: HOME | End: 2021-04-28
Payer: MEDICARE

## 2021-04-28 VITALS — SYSTOLIC BLOOD PRESSURE: 160 MMHG | DIASTOLIC BLOOD PRESSURE: 70 MMHG

## 2021-04-28 DIAGNOSIS — R06.02 SOBOE (SHORTNESS OF BREATH ON EXERTION): ICD-10-CM

## 2021-04-28 PROCEDURE — 93351 STRESS TTE COMPLETE: CPT | Performed by: INTERNAL MEDICINE

## 2021-04-28 PROCEDURE — 93350 STRESS TTE ONLY: CPT

## 2021-04-29 DIAGNOSIS — R06.02 SOBOE (SHORTNESS OF BREATH ON EXERTION): ICD-10-CM

## 2021-04-29 DIAGNOSIS — R01.1 MURMUR, HEART: ICD-10-CM

## 2021-04-29 DIAGNOSIS — R94.39 POSITIVE CARDIAC STRESS TEST: Primary | ICD-10-CM

## 2021-04-29 NOTE — RESULT ENCOUNTER NOTE
Please let pt know that her stress test was partially positive  She did have EKG changes in response to exercise but her images were normal  I need to refer her to cardio for further evaluation  Order is placed  Thank you

## 2021-05-27 ENCOUNTER — CONSULT (OUTPATIENT)
Dept: CARDIOLOGY CLINIC | Facility: CLINIC | Age: 76
End: 2021-05-27
Payer: MEDICARE

## 2021-05-27 VITALS
WEIGHT: 130 LBS | DIASTOLIC BLOOD PRESSURE: 50 MMHG | HEART RATE: 76 BPM | SYSTOLIC BLOOD PRESSURE: 160 MMHG | BODY MASS INDEX: 22.31 KG/M2

## 2021-05-27 DIAGNOSIS — I10 ESSENTIAL HYPERTENSION: ICD-10-CM

## 2021-05-27 DIAGNOSIS — E78.00 HYPERCHOLESTEROLEMIA: ICD-10-CM

## 2021-05-27 DIAGNOSIS — I34.0 NONRHEUMATIC MITRAL VALVE REGURGITATION: ICD-10-CM

## 2021-05-27 DIAGNOSIS — I20.9 ANGINA PECTORIS (HCC): Primary | ICD-10-CM

## 2021-05-27 DIAGNOSIS — R94.39 POSITIVE CARDIAC STRESS TEST: ICD-10-CM

## 2021-05-27 DIAGNOSIS — R01.1 MURMUR, HEART: ICD-10-CM

## 2021-05-27 DIAGNOSIS — R06.02 SOBOE (SHORTNESS OF BREATH ON EXERTION): ICD-10-CM

## 2021-05-27 PROCEDURE — 99204 OFFICE O/P NEW MOD 45 MIN: CPT | Performed by: INTERNAL MEDICINE

## 2021-05-27 PROCEDURE — 93000 ELECTROCARDIOGRAM COMPLETE: CPT | Performed by: INTERNAL MEDICINE

## 2021-05-27 NOTE — PROGRESS NOTES
Cardiology Consultation     Collins Nguyen  [id number]  1945  [address] [facility] CARDIOLOGY [place]  [address] [facility] [address]      1  Angina pectoris ([address] )     2  Nonrheumatic mitral valve regurgitation     3  Positive cardiac stress test  Ambulatory referral to Cardiology    POCT ECG   4  Hypercholesterolemia     5  Essential hypertension     6  SOBOE (shortness of breath on exertion)  Ambulatory referral to Cardiology    POCT ECG    Echo complete with contrast if indicated   7  Murmur, heart  Ambulatory referral to Cardiology    Echo complete with contrast if indicated       03/10/2021 lipid profile: Cholesterol 198, triglycerides 65, HDL 51, LDL calculated 134, non HDL cholesterol 147  HPI:  Patient is a is a 21-year-old female who developed symptoms of chest tightness radiating into her neck and jaw with shortness of breath on physical exertion such as going up a flight of stairs  The discomfort would melba with rest   Her primary care physician ordered a stress echocardiogram which was nondiagnostic and equivocal for myocardial ischemia  Mild mitral regurgitation was noted on the echocardiogram   Patient states that since the stress test, her exertional chest tightness and shortness of breath has been much less severe  Patient has no family history of coronary artery disease  A sister has a murmur  She is a lifetime nonsmoker  On examination, patient has a very loud holosystolic murmur at the apex radiating to the axilla which is strongly suggestive of more than mild mitral regurgitation  Discussion/Summary:   1  Transthoracic echocardiogram at either [facility] [place] or [facility] with attention to the mitral regurgitation calculations of PISA, ERO, ERV, and Vena Contracta     2  Return within 1 week of echocardiogram    Patient Active Problem List   Diagnosis    Generalized anxiety disorder    Diverticulosis    GERD without esophagitis    Hiatal hernia    Hypercholesterolemia    Essential hypertension    Irritable bowel syndrome    Malignant melanoma of skin (HCC)    Age-related osteoporosis without current pathological fracture    Squamous cell carcinoma of skin    Splenic artery aneurysm (HCC)    Other shoulder lesions, unspecified shoulder    Other specified disorders of rotator cuff syndrome of shoulder and allied disorders    Healthcare maintenance    Murmur, heart    Dysuria    Medicare annual wellness visit, subsequent    Hair loss    Bilateral impacted cerumen    SOBOE (shortness of breath on exertion)    Nonrheumatic mitral valve regurgitation    Angina pectoris ([address] )     History reviewed  No pertinent past medical history    Social History     Socioeconomic History    Marital status: /Civil Union     Spouse name: Not on file    Number of children: Not on file    Years of education: Not on file    Highest education level: Not on file   Occupational History    Occupation: retired    Social Needs    Financial resource strain: Not on file    Food insecurity     Worry: Not on file     Inability: Not on file   [employer] needs     Medical: Not on file     Non-medical: Not on file   Tobacco Use    Smoking status: Never Smoker    Smokeless tobacco: Never Used    Tobacco comment: No secondhand smoke exposure    Substance and Sexual Activity    Alcohol use: No    Drug use: Not on file    Sexual activity: Not on file   Lifestyle    Physical activity     Days per week: Not on file     Minutes per session: Not on file    Stress: Not on file   Relationships    Social connections     Talks on phone: Not on file     Gets together: Not on file     Attends Zoroastrianism service: Not on file     Active member of club or organization: Not on file     Attends meetings of clubs or organizations: Not on file     Relationship status: Not on file    Intimate partner violence     Fear of current or ex partner: Not on file     Emotionally abused: Not on file     Physically abused: Not on file     Forced sexual activity: Not on file   Other Topics Concern    Not on file   Social History Narrative    Not on file      Family History   Problem Relation Age of Onset    Cervical cancer Mother     Prostate cancer Father     Osteoporosis Sister      History reviewed  No pertinent surgical history  Current Outpatient Medications:     ALPRAZolam (XANAX) 0 25 mg tablet, Take 1 tablet by mouth daily as needed, Disp: , Rfl:     amLODIPine (NORVASC) 2 5 mg tablet, Take 1 tablet (2 5 mg total) by mouth daily, Disp: 90 tablet, Rfl: 3    b complex vitamins capsule, Take 1 capsule by mouth daily, Disp: , Rfl:     BIOTIN PO, Take by mouth, Disp: , Rfl:     calcium carbonate (Calcium 600) 600 MG tablet, Take 600 mg by mouth 2 (two) times a day with meals, Disp: , Rfl:     Cholecalciferol (VITAMIN D3) 1000 units CAPS, Take 2 capsules by mouth daily, Disp: , Rfl:     omeprazole (PriLOSEC) 20 mg delayed release capsule, Take by mouth, Disp: , Rfl:     Vitamin Mixture (Glenna-C) 500-60 MG TABS, Take by mouth, Disp: , Rfl:   Allergies   Allergen Reactions    Macrobid [Nitrofurantoin] GI Intolerance    Penicillins      Childhood       Results for orders placed or performed in visit on 05/27/21   POCT ECG    Narrative      Normal sinus rhythm at a rate of 77 beats per minute  Normal  EKG  Review of Systems:  Review of Systems   Constitutional: Negative  HENT: Negative  Respiratory: Positive for shortness of breath  Negative for chest tightness  Cardiovascular: Positive for chest pain  Negative for palpitations and leg swelling  Gastrointestinal: Negative  Endocrine: Negative  Genitourinary: Negative  Musculoskeletal: Negative  Skin: Negative  Allergic/Immunologic: Negative  Neurological: Negative  Hematological: Negative  Psychiatric/Behavioral: Negative          Physical Exam:  /50 (BP Location: Right arm, Patient Position: Sitting, Cuff Size: Adult)   Pulse 76   Wt 59 kg (130 lb)   BMI 22 31 kg/m²   Physical Exam  Constitutional:       Appearance: She is well-developed  HENT:      Head: Normocephalic and atraumatic  Neck:      Musculoskeletal: Normal range of motion and neck supple  Thyroid: No thyromegaly  Vascular: No JVD  Trachea: No tracheal deviation  Cardiovascular:      Rate and Rhythm: Normal rate and regular rhythm  Heart sounds: Murmur present  No friction rub  No gallop  Comments:  Loud grade 3/6 holosystolic murmur at the apex radiating to the axilla  Pulmonary:      Effort: Pulmonary effort is normal  No respiratory distress  Breath sounds: Normal breath sounds  No wheezing, rhonchi or rales  Abdominal:      General: Bowel sounds are normal       Palpations: Abdomen is soft  Musculoskeletal: Normal range of motion  Right lower leg: No edema  Left lower leg: No edema  Skin:     General: Skin is warm and dry  Neurological:      General: No focal deficit present  Mental Status: She is alert and oriented to person, place, and time  Psychiatric:         Mood and Affect: Mood normal          Behavior: Behavior normal          Thought Content:  Thought content normal          Judgment: Judgment normal          ======================================================  Lab Results   Component Value Date    WBC 6 44 03/10/2021    HGB 14 1 03/10/2021    HCT 43 2 03/10/2021    MCV 92 03/10/2021     03/10/2021      Lab Results   Component Value Date    SODIUM 139 03/10/2021    K 4 3 03/10/2021     03/10/2021    CO2 28 03/10/2021    BUN 21 03/10/2021    CREATININE 0 79 03/10/2021    GLUC 85 06/20/2016    CALCIUM 9 4 03/10/2021      No results found for: HGBA1C   Lab Results   Component Value Date    CHOL 178 05/07/2014     Lab Results   Component Value Date    HDL 51 03/10/2021    HDL 55 03/03/2020 HDL 57 09/19/2018     Lab Results   Component Value Date    LDLCALC 134 (H) 03/10/2021    LDLCALC 109 (H) 03/03/2020    LDLCALC 111 (H) 09/19/2018     Lab Results   Component Value Date    TRIG 65 03/10/2021    TRIG 64 03/03/2020    TRIG 64 09/19/2018

## 2021-05-27 NOTE — LETTER
May 27, 2021     Vinny Fournier [address]    Patient: Una Cruz   YOB: 1945   Date of Visit: 5/27/2021       Dear Dr Rupinder Dudley: Thank you for referring Una Cruz to me for evaluation  Below are my notes for this consultation  If you have questions, please do not hesitate to call me  I look forward to following your patient along with you  Sincerely,        Mayank Avalos MD        CC: No Recipients  Mayank Avalos MD  5/27/2021 12:37 PM  Sign when Signing Visit                                            Cardiology Consultation     Una Cruz  [id number]  1945  [address]  [address] [facility] [id number]      1  Angina pectoris ([address] )     2  Nonrheumatic mitral valve regurgitation     3  Positive cardiac stress test  Ambulatory referral to Cardiology    POCT ECG   4  Hypercholesterolemia     5  Essential hypertension     6  SOBOE (shortness of breath on exertion)  Ambulatory referral to Cardiology    POCT ECG    Echo complete with contrast if indicated   7  Murmur, heart  Ambulatory referral to Cardiology    Echo complete with contrast if indicated       03/10/2021 lipid profile: Cholesterol 198, triglycerides 65, HDL 51, LDL calculated 134, non HDL cholesterol 147  HPI:  Patient is a is a 81-year-old female who developed symptoms of chest tightness radiating into her neck and jaw with shortness of breath on physical exertion such as going up a flight of stairs  The discomfort would melba with rest   Her primary care physician ordered a stress echocardiogram which was nondiagnostic and equivocal for myocardial ischemia  Mild mitral regurgitation was noted on the echocardiogram   Patient states that since the stress test, her exertional chest tightness and shortness of breath has been much less severe  Patient has no family history of coronary artery disease  A sister has a murmur  She is a lifetime nonsmoker  On examination, patient has a very loud holosystolic murmur at the apex radiating to the axilla which is strongly suggestive of more than mild mitral regurgitation  Discussion/Summary:   1  [facility]ansthoracic echocardiogram at either [facility] or [facility] with attention to the mitral regurgitation calculations of PISA, ERO, ERV, and Vena Contracta  2  Return within 1 week of echocardiogram    Patient Active Problem List   Diagnosis    Generalized anxiety disorder    Diverticulosis    GERD without esophagitis    Hiatal hernia    Hypercholesterolemia    Essential hypertension    Irritable bowel syndrome    [facility]    Age-related osteoporosis without current pathological fracture    Squamous cell carcinoma of skin    Splenic artery aneurysm (HCC)    Other shoulder lesions, unspecified shoulder    Other specified disorders of rotator cuff syndrome of shoulder and allied disorders    Healthcare maintenance    Murmur, heart    Dysuria    Medicare annual wellness visit, subsequent    Hair loss    Bilateral impacted cerumen    SOBOE (shortness of breath on exertion)    Nonrheumatic mitral valve regurgitation    Angina pectoris ([facility] )     History reviewed  No pertinent past medical history    Social History     Socioeconomic History    Marital status: /Civil Union     Spouse name: Not on file    Number of children: Not on file    Years of education: Not on file    Highest education level: Not on file   Occupational History    Occupation: retired    Social Needs    Financial resource strain: Not on file    Food insecurity     Worry: Not on file     Inability: Not on file   [employer] needs     Medical: Not on file     Non-medical: Not on file   Tobacco Use    Smoking status: Never Smoker    Smokeless tobacco: Never Used    Tobacco comment: No secondhand smoke exposure    Substance and Sexual Activity    Alcohol use: No    Drug use: Not on file    Sexual activity: Not on file   Lifestyle    Physical activity     Days per week: Not on file     Minutes per session: Not on file    Stress: Not on file   Relationships    Social connections     Talks on phone: Not on file     Gets together: Not on file     Attends Rastafari service: Not on file     Active member of club or organization: Not on file     Attends meetings of clubs or organizations: Not on file     Relationship status: Not on file    Intimate partner violence     Fear of current or ex partner: Not on file     Emotionally abused: Not on file     Physically abused: Not on file     Forced sexual activity: Not on file   Other Topics Concern    Not on file   Social History Narrative    Not on file      Family History   Problem Relation Age of Onset    Cervical cancer Mother     Prostate cancer Father     Osteoporosis Sister      History reviewed  No pertinent surgical history  Current Outpatient Medications:     ALPRAZolam (XANAX) 0 25 mg tablet, Take 1 tablet by mouth daily as needed, Disp: , Rfl:     amLODIPine (NORVASC) 2 5 mg tablet, Take 1 tablet (2 5 mg total) by mouth daily, Disp: 90 tablet, Rfl: 3    b complex vitamins capsule, Take 1 capsule by mouth daily, Disp: , Rfl:     BIOTIN PO, Take by mouth, Disp: , Rfl:     calcium carbonate (Calcium 600) 600 MG tablet, Take 600 mg by mouth 2 (two) times a day with meals, Disp: , Rfl:     Cholecalciferol (VITAMIN D3) 1000 units CAPS, Take 2 capsules by mouth daily, Disp: , Rfl:     omeprazole (PriLOSEC) 20 mg delayed release capsule, Take by mouth, Disp: , Rfl:     Vitamin Mixture (Glenna-C) 500-60 MG TABS, Take by mouth, Disp: , Rfl:   Allergies   Allergen Reactions    Macrobid [Nitrofurantoin] GI Intolerance    Penicillins      Childhood       Results for orders placed or performed in visit on 05/27/21   POCT ECG    Narrative      Normal sinus rhythm at a rate of 77 beats per minute  Normal  EKG  Review of Systems:  Review of Systems   Constitutional: Negative  HENT: Negative  Respiratory: Positive for shortness of breath  Negative for chest tightness  Cardiovascular: Positive for chest pain  Negative for palpitations and leg swelling  Gastrointestinal: Negative  Endocrine: Negative  Genitourinary: Negative  Musculoskeletal: Negative  Skin: Negative  Allergic/Immunologic: Negative  Neurological: Negative  Hematological: Negative  Psychiatric/Behavioral: Negative  Physical Exam:  /50 (BP Location: Right arm, Patient Position: Sitting, Cuff Size: Adult)   Pulse 76   Wt 59 kg (130 lb)   BMI 22 31 kg/m²   Physical Exam  Constitutional:       Appearance: She is well-developed  HENT:      Head: Normocephalic and atraumatic  Neck:      Musculoskeletal: Normal range of motion and neck supple  Thyroid: No thyromegaly  Vascular: No JVD  Trachea: No tracheal deviation  Cardiovascular:      Rate and Rhythm: Normal rate and regular rhythm  Heart sounds: Murmur present  No friction rub  No gallop  Comments:  Loud grade 3/6 holosystolic murmur at the apex radiating to the axilla  Pulmonary:      Effort: Pulmonary effort is normal  No respiratory distress  Breath sounds: Normal breath sounds  No wheezing, rhonchi or rales  Abdominal:      General: Bowel sounds are normal       Palpations: Abdomen is soft  Musculoskeletal: Normal range of motion  Right lower leg: No edema  Left lower leg: No edema  Skin:     General: Skin is warm and dry  Neurological:      General: No focal deficit present  Mental Status: She is alert and oriented to person, place, and time  Psychiatric:         Mood and Affect: Mood normal          Behavior: Behavior normal          Thought Content:  Thought content normal          Judgment: Judgment normal  ======================================================  Lab Results   Component Value Date    WBC 6 44 03/10/2021    HGB 14 1 03/10/2021    HCT 43 2 03/10/2021    MCV 92 03/10/2021     03/10/2021      Lab Results   Component Value Date    SODIUM 139 03/10/2021    K 4 3 03/10/2021     03/10/2021    CO2 28 03/10/2021    BUN 21 03/10/2021    CREATININE 0 79 03/10/2021    GLUC 85 06/20/2016    CALCIUM 9 4 03/10/2021      No results found for: HGBA1C   Lab Results   Component Value Date    CHOL 178 05/07/2014     Lab Results   Component Value Date    HDL 51 03/10/2021    HDL 55 03/03/2020    HDL 57 09/19/2018     Lab Results   Component Value Date    LDLCALC 134 (H) 03/10/2021    LDLCALC 109 (H) 03/03/2020    LDLCALC 111 (H) 09/19/2018     Lab Results   Component Value Date    TRIG 65 03/10/2021    TRIG 64 03/03/2020    TRIG 64 09/19/2018

## 2021-05-30 DIAGNOSIS — I10 HYPERTENSION, UNSPECIFIED TYPE: ICD-10-CM

## 2021-05-31 RX ORDER — AMLODIPINE BESYLATE 2.5 MG/1
TABLET ORAL
Qty: 90 TABLET | Refills: 3 | Status: SHIPPED | OUTPATIENT
Start: 2021-05-31 | End: 2022-05-23

## 2021-06-01 ENCOUNTER — HOSPITAL ENCOUNTER (OUTPATIENT)
Dept: NON INVASIVE DIAGNOSTICS | Facility: HOSPITAL | Age: 76
Discharge: HOME | End: 2021-06-01
Payer: MEDICARE

## 2021-06-01 DIAGNOSIS — R06.02 SOBOE (SHORTNESS OF BREATH ON EXERTION): ICD-10-CM

## 2021-06-01 DIAGNOSIS — R01.1 MURMUR, HEART: ICD-10-CM

## 2021-06-01 PROCEDURE — 93306 TTE W/DOPPLER COMPLETE: CPT | Performed by: INTERNAL MEDICINE

## 2021-06-01 PROCEDURE — 93306 TTE W/DOPPLER COMPLETE: CPT

## 2021-06-11 ENCOUNTER — OFFICE VISIT (OUTPATIENT)
Dept: CARDIOLOGY CLINIC | Facility: CLINIC | Age: 76
End: 2021-06-11
Payer: MEDICARE

## 2021-06-11 VITALS
HEART RATE: 80 BPM | BODY MASS INDEX: 22.62 KG/M2 | DIASTOLIC BLOOD PRESSURE: 60 MMHG | WEIGHT: 131.8 LBS | SYSTOLIC BLOOD PRESSURE: 150 MMHG

## 2021-06-11 DIAGNOSIS — I10 ESSENTIAL HYPERTENSION: ICD-10-CM

## 2021-06-11 DIAGNOSIS — I34.0 NONRHEUMATIC MITRAL VALVE REGURGITATION: ICD-10-CM

## 2021-06-11 DIAGNOSIS — E78.00 HYPERCHOLESTEROLEMIA: ICD-10-CM

## 2021-06-11 DIAGNOSIS — I20.9 ANGINA PECTORIS (HCC): Primary | ICD-10-CM

## 2021-06-11 PROCEDURE — 99214 OFFICE O/P EST MOD 30 MIN: CPT | Performed by: INTERNAL MEDICINE

## 2021-06-11 NOTE — PROGRESS NOTES
Cardiology Follow Up    Lan Stubbs  1945  [id number]  [address] [facility] [address]  [phone]    1  Angina pectoris (HCC)  NM myocardial perfusion spect (stress and/or rest)   2  Nonrheumatic mitral valve regurgitation     3  Hypercholesterolemia     4  Essential hypertension         03/10/2021 lipid profile: Cholesterol 198, triglycerides 65, HDL 51, LDL calculated 134, non HDL cholesterol 147  Interval History:  Patient is no longer having symptoms of exertional chest discomfort radiating to her neck  She has been active and arm denies chest discomfort or shortness of breath  However, her original story was classic for exertional angina pectoris  By examination she appears to have significant mitral regurgitation  A resting echocardiogram was repeated to see whether the mitral regurgitation was severe  If the mitral regurgitation was severe, I would lean towards a cardiac catheterization  However, the mitral regurgitation was moderate and so I the lean towards having a  Nuclear stress test to rule out coronary ischemia since the stress echocardiogram was equivocal     Discussion/Summary:    1  Nuclear stress test  2  Return following stress test   3   Consider repeat echocardiogram in 6 months to see whether mitral regurgitation and LV size is stable    Patient Active Problem List   Diagnosis    Generalized anxiety disorder    Diverticulosis    GERD without esophagitis    Hiatal hernia    Hypercholesterolemia    Essential hypertension    Irritable bowel syndrome    Malignant melanoma of skin ([facility] [address] )    Age-related osteoporosis without current pathological fracture    Squamous cell carcinoma of skin    Splenic artery aneurysm (HCC)    Other shoulder lesions, unspecified shoulder    Other specified disorders of rotator cuff syndrome of shoulder and allied disorders   [facility] maintenance    Murmur, heart    Dysuria    Medicare annual wellness visit, subsequent    Hair loss    Bilateral impacted cerumen    SOBOE (shortness of breath on exertion)    Nonrheumatic mitral valve regurgitation    Angina pectoris ([address] )     History reviewed  No pertinent past medical history  Social History     Socioeconomic History    Marital status: /Civil Union     Spouse name: Not on file    Number of children: Not on file    Years of education: Not on file    Highest education level: Not on file   Occupational History    Occupation: retired    Social Needs    Financial resource strain: Not on file    Food insecurity     Worry: Not on file     Inability: Not on file   [employer] needs     Medical: Not on file     Non-medical: Not on file   Tobacco Use    Smoking status: Never Smoker    Smokeless tobacco: Never Used    Tobacco comment: No secondhand smoke exposure    Substance and Sexual Activity    Alcohol use: No    Drug use: Not on file    Sexual activity: Not on file   Lifestyle    Physical activity     Days per week: Not on file     Minutes per session: Not on file    Stress: Not on file   Relationships    Social connections     Talks on phone: Not on file     Gets together: Not on file     Attends Qu[employer] service: Not on file     Active member of club or organization: Not on file     Attends meetings of clubs or organizations: Not on file     Relationship status: Not on file    Intimate partner violence     Fear of current or ex partner: Not on file     Emotionally abused: Not on file     Physically abused: Not on file     Forced sexual activity: Not on file   Other Topics Concern    Not on file   Social History Narrative    Not on file      Family History   Problem Relation Age of Onset    Cervical cancer Mother     Prostate cancer Father     Osteoporosis Sister      History reviewed  No pertinent surgical history      Current Outpatient Medications:    ALPRAZolam (XANAX) 0 25 mg tablet, Take 1 tablet by mouth daily as needed, Disp: , Rfl:     amLODIPine (NORVASC) 2 5 mg tablet, TAKE 1 TABLET BY MOUTH DAILY, Disp: 90 tablet, Rfl: 3    b complex vitamins capsule, Take 1 capsule by mouth daily, Disp: , Rfl:     BIOTIN PO, Take by mouth, Disp: , Rfl:     calcium carbonate (Calcium 600) 600 MG tablet, Take 600 mg by mouth 2 (two) times a day with meals, Disp: , Rfl:     Cholecalciferol (VITAMIN D3) 1000 units CAPS, Take 2 capsules by mouth daily, Disp: , Rfl:     omeprazole (PriLOSEC) 20 mg delayed release capsule, Take by mouth, Disp: , Rfl:     Vitamin Mixture ([place]-C) 500-60 MG TABS, Take by mouth, Disp: , Rfl:   Allergies   Allergen Reactions    Macrobid [Nitrofurantoin] GI Intolerance    Penicillins      Childhood       No results found for this visit on 06/11/21  Review of Systems:  Review of Systems   Respiratory: Negative for cough, choking, chest tightness, shortness of breath and wheezing  Cardiovascular: Negative for chest pain, palpitations and leg swelling  Musculoskeletal: Negative for gait problem  Skin: Negative for rash  Neurological: Negative for dizziness, tremors, syncope, weakness, light-headedness, numbness and headaches  Psychiatric/Behavioral: Negative for agitation and behavioral problems  The patient is not hyperactive  Physical Exam:  /60 (BP Location: Right arm, Patient Position: Sitting, Cuff Size: Adult)   Pulse 80   Wt 59 8 kg (131 lb 12 8 oz)   BMI 22 62 kg/m²   Physical Exam  Constitutional:       General: She is not in acute distress  Appearance: She is well-developed  HENT:      Head: Normocephalic and atraumatic  Neck:      Musculoskeletal: Normal range of motion and neck supple  Thyroid: No thyromegaly  Vascular: No carotid bruit or JVD  Trachea: No tracheal deviation  Cardiovascular:      Rate and Rhythm: Normal rate and regular rhythm  Pulses: Normal pulses  Heart sounds: Normal heart sounds  No murmur  No friction rub  No gallop  Pulmonary:      Effort: Pulmonary effort is normal  No respiratory distress  Breath sounds: Normal breath sounds  No wheezing, rhonchi or rales  Chest:      Chest wall: No tenderness  Abdominal:      General: Bowel sounds are normal  There is no distension  Palpations: Abdomen is soft  Tenderness: There is no abdominal tenderness  Musculoskeletal: Normal range of motion  Right lower leg: No edema  Left lower leg: No edema  Skin:     General: Skin is warm and dry  Neurological:      General: No focal deficit present  Mental Status: She is alert and oriented to person, place, and time  Gait: Gait normal    Psychiatric:         Mood and Affect: Mood normal          Behavior: Behavior normal          Thought Content: Thought content normal          Judgment: Judgment normal          --------------------------------------------------------------------------------  ECHO:   Results for orders placed during the hospital encounter of 21   Echo complete with contrast if indicated    [facility]  [facility], [address] [facility]  [phone]    Transthoracic Echocardiogram  2D, M-mode, Doppler, and Color Doppler    Study date:  2021    Patient: Kaya Griggs  MR number: [id number]  Account number: [de-identified]  : 1945  Age: 78 years  Gender: Female  Status: Outpatient  Location: Echo lab  Height: 64 in  Weight: 130 lb  BP: 148/ 76 mmHg    Indications: SOB  Diagnoses: R06 02 - Shortness of breath    Sonographer:  GM Mruo  Interpreting Physician:  Louis Coates MD  Primary Physician:  [facility]  Referring Physician:  Sergio Keita MD  Group:  [facility]  Cardiology Fellow:  Naz Nunn DO    SUMMARY    PROCEDURE INFORMATION:  This was a technically difficult study      LEFT VENTRICLE:  Size was normal   Systolic function was normal  Ejection fraction was estimated to be 60 %  There was mild concentric hypertrophy  Features were consistent with a pseudonormal left ventricular filling pattern, with concomitant abnormal relaxation and increased filling pressure (grade 2 diastolic dysfunction)  RIGHT VENTRICLE:  The size was normal   Systolic function was normal     LEFT ATRIUM:  The atrium was mildly to moderately dilated  MITRAL VALVE:  There was moderate regurgitation  AORTIC VALVE:  There was trace regurgitation  TRICUSPID VALVE:  There was trace regurgitation  HISTORY: PRIOR HISTORY: MR;SOB;HTN;HLD  PROCEDURE: The procedure was performed in the echo lab  This was a routine study  The transthoracic approach was used  The study included complete 2D imaging, M-mode, complete spectral Doppler, and color Doppler  The heart rate was 83 bpm,  at the start of the study  Images were obtained from the parasternal, apical, subcostal, and suprasternal notch acoustic windows  This was a technically difficult study  LEFT VENTRICLE: Size was normal  Systolic function was normal  Ejection fraction was estimated to be 60 %  Although no diagnostic regional wall motion abnormality was identified, this possibility cannot be completely excluded on the basis  of this study  Wall thickness was at the upper limits of normal  There was mild concentric hypertrophy  DOPPLER: Features were consistent with a pseudonormal left ventricular filling pattern, with concomitant abnormal relaxation and  increased filling pressure (grade 2 diastolic dysfunction)  RIGHT VENTRICLE: The size was normal  Systolic function was normal     LEFT ATRIUM: The atrium was mildly to moderately dilated  RIGHT ATRIUM: Size was normal     MITRAL VALVE: There was moderate to marked annular calcification  There was normal leaflet separation  DOPPLER: The transmitral velocity was within the normal range   There was no evidence for stenosis  There was moderate regurgitation  AORTIC VALVE: The valve was probably trileaflet  Leaflets exhibited mildly to moderately increased thickness, mild calcification, and normal cuspal separation  DOPPLER: Transaortic velocity was within the normal range  This was a  technically difficult study but there does not appear to be severe stenotic disease  There was trace regurgitation  TRICUSPID VALVE: The valve structure was normal  There was normal leaflet separation  DOPPLER: The transtricuspid velocity was within the normal range  There was no evidence for stenosis  There was trace regurgitation  The tricuspid jet  envelope definition was inadequate for estimation of RV systolic pressure  There are no indirect findings suggestive of moderate or severe pulmonary hypertension  PULMONIC VALVE: Leaflets exhibited normal thickness, no calcification, and normal cuspal separation  DOPPLER: The transpulmonic velocity was within the normal range  There was trace regurgitation  PERICARDIUM: There was no pericardial effusion  AORTA: The root exhibited normal size  SYSTEMIC VEINS: IVC: The inferior vena cava was normal in size and course   Respirophasic changes were normal     SYSTEM MEASUREMENT TABLES    2D  %FS: 28 32 %  Ao Diam: 2 88 cm  EDV(Teich): 98 19 ml  EF(Teich): 54 74 %  ESV(Teich): 44 44 ml  IVSd: 0 98 cm  LA Area: 28 3 cm2  LA Diam: 4 45 cm  LVEDV MOD A4C: 85 09 ml  LVEF MOD A4C: 63 6 %  LVESV MOD A4C: 30 97 ml  LVIDd: 4 62 cm  LVIDs: 3 31 cm  LVLd A4C: 7 57 cm  LVLs A4C: 5 8 cm  LVPWd: 0 99 cm  RA Area: 13 87 cm2  RVIDd: 3 34 cm  SV MOD A4C: 54 11 ml  SV(Teich): 53 75 ml    CW  AR Dec [place]: 3 24 m/s2  AR Dec Time: 1441 28 ms  AR PHT: 417 97 ms  AR Vmax: 4 66 m/s  AR maxP 95 mmHg  TR Vmax: 3 79 m/s  TR maxP 36 mmHg    MM  TAPSE: 2 37 cm    PW  E' Sept: 0 08 m/s  E/E' Sept: 17 8  MV A Justen: 1 m/s  MV Dec [place]: 7 09 m/s2  MV DecT: 227 76 ms  MV E Justen: 1 5 m/s  MV E/A Ratio: 1 5  MV PHT: 66 05 ms  MVA By PHT: 3 33 cm2    [employer] Accredited Echocardiography Laboratory    Prepared and electronically signed by    Kay Higgins MD  Signed 01-Jun-2021 13:14:49         ======================================================    Lab Results   Component Value Date    WBC 6 44 03/10/2021    HGB 14 1 03/10/2021    HCT 43 2 03/10/2021    MCV 92 03/10/2021     03/10/2021      Lab Results   Component Value Date    SODIUM 139 03/10/2021    K 4 3 03/10/2021     03/10/2021    CO2 28 03/10/2021    BUN 21 03/10/2021    CREATININE 0 79 03/10/2021    GLUC 85 06/20/2016    CALCIUM 9 4 03/10/2021      No results found for: HGBA1C   Lab Results   Component Value Date    CHOL 178 05/07/2014     Lab Results   Component Value Date    HDL 51 03/10/2021    HDL 55 03/03/2020    HDL 57 09/19/2018     Lab Results   Component Value Date    LDLCALC 134 (H) 03/10/2021    LDLCALC 109 (H) 03/03/2020    LDLCALC 111 (H) 09/19/2018     Lab Results   Component Value Date    TRIG 65 03/10/2021    TRIG 64 03/03/2020    TRIG 64 09/19/2018     No results found for: CHOLHDL   No results found for: INR, PROTIME     Imaging:   I have personally reviewed pertinent reports  Portions of the record may have been created with voice recognition software  Occasional wrong word or "sound a like" substitutions may have occurred due to the inherent limitations of voice recognition software  Read the chart carefully and recognize, using context, where substitutions have occurred

## 2021-06-11 NOTE — LETTER
June 11, 2021     Vinny Peters [address] [place] [address]    Patient: Collins Nguyen   YOB: 1945   Date of Visit: 6/11/2021       Dear Dr Sammy Moritz: Thank you for referring Collins Nguyen to me for evaluation  Below are my notes for this consultation  If you have questions, please do not hesitate to call me  I look forward to following your patient along with you  Sincerely,        Lorena Murguia MD        CC: No Recipients  Lorena Murguia MD  6/11/2021  2:59 PM  Sign when Signing Visit                                             Cardiology Follow Up    Collins Nguyen  1945  [id number]  [address] [facility]  [address] [facility] [address] [phone]    1  Angina pectoris (HCC)  NM myocardial perfusion spect (stress and/or rest)   2  Nonrheumatic mitral valve regurgitation     3  Hypercholesterolemia     4  Essential hypertension         03/10/2021 lipid profile: Cholesterol 198, triglycerides 65, HDL 51, LDL calculated 134, non HDL cholesterol 147  Interval History:  Patient is no longer having symptoms of exertional chest discomfort radiating to her neck  She has been active and arm denies chest discomfort or shortness of breath  However, her original story was classic for exertional angina pectoris  By examination she appears to have significant mitral regurgitation  A resting echocardiogram was repeated to see whether the mitral regurgitation was severe  If the mitral regurgitation was severe, I would lean towards a cardiac catheterization  However, the mitral regurgitation was moderate and so I the lean towards having a  Nuclear stress test to rule out coronary ischemia since the stress echocardiogram was equivocal     Discussion/Summary:    1  Nuclear stress test  2  Return following stress test   3   Consider repeat echocardiogram in 6 months to see whether mitral regurgitation and LV size is stable    Patient Active Problem List   Diagnosis    Generalized anxiety disorder    Diverticulosis    GERD without esophagitis    Hiatal hernia    Hypercholesterolemia    Essential hypertension    Irritable bowel syndrome    Malignant melanoma of skin ([facility] )    Age-related osteoporosis without current pathological fracture    Squamous cell carcinoma of skin    Splenic artery aneurysm (HCC)    Other shoulder lesions, unspecified shoulder    Other specified disorders of rotator cuff syndrome of shoulder and allied disorders    Healthcare maintenance    Murmur, heart    Dysuria    Medicare annual wellness visit, subsequent    Hair loss    Bilateral impacted cerumen    SOBOE (shortness of breath on exertion)    Nonrheumatic mitral valve regurgitation    Angina pectoris ([facility] )     History reviewed  No pertinent past medical history    Social History     Socioeconomic History    Marital status: /Civil Union     Spouse name: Not on file    Number of children: Not on file    Years of education: Not on file    Highest education level: Not on file   Occupational History    Occupation: retired    Social Needs    Financial resource strain: Not on file    Food insecurity     Worry: Not on file     Inability: Not on file   [employer] needs     Medical: Not on file     Non-medical: Not on file   Tobacco Use    Smoking status: Never Smoker    Smokeless tobacco: Never Used    Tobacco comment: No secondhand smoke exposure    Substance and Sexual Activity    Alcohol use: No    Drug use: Not on file    Sexual activity: Not on file   Lifestyle    Physical activity     Days per week: Not on file     Minutes per session: Not on file    Stress: Not on file   Relationships    Social connections     Talks on phone: Not on file     Gets together: Not on file     Attends Yazdanism service: Not on file     Active member of club or organization: Not on file     Attends meetings of clubs or organizations: Not on file     Relationship status: Not on file    Intimate partner violence     Fear of current or ex partner: Not on file     Emotionally abused: Not on file     Physically abused: Not on file     Forced sexual activity: Not on file   Other Topics Concern    Not on file   Social History Narrative    Not on file      Family History   Problem Relation Age of Onset    Cervical cancer Mother     Prostate cancer Father     Osteoporosis Sister      History reviewed  No pertinent surgical history  Current Outpatient Medications:     ALPRAZolam (XANAX) 0 25 mg tablet, Take 1 tablet by mouth daily as needed, Disp: , Rfl:     amLODIPine (NORVASC) 2 5 mg tablet, TAKE 1 TABLET BY MOUTH DAILY, Disp: 90 tablet, Rfl: 3    b complex vitamins capsule, Take 1 capsule by mouth daily, Disp: , Rfl:     BIOTIN PO, Take by mouth, Disp: , Rfl:     calcium carbonate (Calcium 600) 600 MG tablet, Take 600 mg by mouth 2 (two) times a day with meals, Disp: , Rfl:     Cholecalciferol (VITAMIN D3) 1000 units CAPS, Take 2 capsules by mouth daily, Disp: , Rfl:     omeprazole (PriLOSEC) 20 mg delayed release capsule, Take by mouth, Disp: , Rfl:     Vitamin Mixture ([place]-C) 500-60 MG TABS, Take by mouth, Disp: , Rfl:   Allergies   Allergen Reactions    Macrobid [Nitrofurantoin] GI Intolerance    Penicillins      Childhood       No results found for this visit on 06/11/21  Review of Systems:  Review of Systems   Respiratory: Negative for cough, choking, chest tightness, shortness of breath and wheezing  Cardiovascular: Negative for chest pain, palpitations and leg swelling  Musculoskeletal: Negative for gait problem  Skin: Negative for rash  Neurological: Negative for dizziness, tremors, syncope, weakness, light-headedness, numbness and headaches  Psychiatric/Behavioral: Negative for agitation and behavioral problems  The patient is not hyperactive          Physical Exam:  /60 (BP Location: Right arm, Patient Position: Sitting, Cuff Size: Adult)   Pulse 80   Wt 59 8 kg (131 lb 12 8 oz)   BMI 22 62 kg/m²   Physical Exam  Constitutional:       General: She is not in acute distress  Appearance: She is well-developed  HENT:      Head: Normocephalic and atraumatic  Neck:      Musculoskeletal: Normal range of motion and neck supple  Thyroid: No thyromegaly  Vascular: No carotid bruit or JVD  Trachea: No tracheal deviation  Cardiovascular:      Rate and Rhythm: Normal rate and regular rhythm  Pulses: Normal pulses  Heart sounds: Normal heart sounds  No murmur  No friction rub  No gallop  Pulmonary:      Effort: Pulmonary effort is normal  No respiratory distress  Breath sounds: Normal breath sounds  No wheezing, rhonchi or rales  Chest:      Chest wall: No tenderness  Abdominal:      General: Bowel sounds are normal  There is no distension  Palpations: Abdomen is soft  Tenderness: There is no abdominal tenderness  Musculoskeletal: Normal range of motion  Right lower leg: No edema  Left lower leg: No edema  Skin:     General: Skin is warm and dry  Neurological:      General: No focal deficit present  Mental Status: She is alert and oriented to person, place, and time  Gait: Gait normal    Psychiatric:         Mood and Affect: Mood normal          Behavior: Behavior normal          Thought Content:  Thought content normal          Judgment: Judgment normal          --------------------------------------------------------------------------------  ECHO:   Results for orders placed during the hospital encounter of 06/01/21   Echo complete with contrast if indicated    Narrative Neha [address]  [facility] [place], [address] [place] [address]  [phone]    Transthoracic Echocardiogram  2D, M-mode, Doppler, and Color Doppler    Study date:  01-Jun-2021    Patient: Joshua Monk  MR number: [id number]  Account number: [id number]  : 1945  Age: 78 years  Gender: Female  Status: Outpatient  Location: Echo lab  Height: 64 in  Weight: 130 lb  BP: 148/ 76 mmHg    Indications: SOB  Diagnoses: R06 02 - Shortness of breath    Sonographer:  MG Calhoun  Interpreting Physician:  uLcinda Kaminski MD  Primary Physician:  Meche Kinsey [facility]  Referring Physician:  Edmund Muse MD  Group:  [facility]  Cardiology Fellow:  Estelita Snell DO    SUMMARY    PROCEDURE INFORMATION:  This was a technically difficult study  LEFT VENTRICLE:  Size was normal   Systolic function was normal  Ejection fraction was estimated to be 60 %  There was mild concentric hypertrophy  Features were consistent with a pseudonormal left ventricular filling pattern, with concomitant abnormal relaxation and increased filling pressure (grade 2 diastolic dysfunction)  RIGHT VENTRICLE:  The size was normal   Systolic function was normal     LEFT ATRIUM:  The atrium was mildly to moderately dilated  MITRAL VALVE:  There was moderate regurgitation  AORTIC VALVE:  There was trace regurgitation  TRICUSPID VALVE:  There was trace regurgitation  HISTORY: PRIOR HISTORY: MR;SOB;HTN;HLD  PROCEDURE: The procedure was performed in the echo lab  This was a routine study  The transthoracic approach was used  The study included complete 2D imaging, M-mode, complete spectral Doppler, and color Doppler  The heart rate was 83 bpm,  at the start of the study  Images were obtained from the parasternal, apical, subcostal, and suprasternal notch acoustic windows  This was a technically difficult study  LEFT VENTRICLE: Size was normal  Systolic function was normal  Ejection fraction was estimated to be 60 %  Although no diagnostic regional wall motion abnormality was identified, this possibility cannot be completely excluded on the basis  of this study   Wall thickness was at the upper limits of normal  There was mild concentric hypertrophy  DOPPLER: Features were consistent with a pseudonormal left ventricular filling pattern, with concomitant abnormal relaxation and  increased filling pressure (grade 2 diastolic dysfunction)  RIGHT VENTRICLE: The size was normal  Systolic function was normal     LEFT ATRIUM: The atrium was mildly to moderately dilated  RIGHT ATRIUM: Size was normal     MITRAL VALVE: There was moderate to marked annular calcification  There was normal leaflet separation  DOPPLER: The transmitral velocity was within the normal range  There was no evidence for stenosis  There was moderate regurgitation  AORTIC VALVE: The valve was probably trileaflet  Leaflets exhibited mildly to moderately increased thickness, mild calcification, and normal cuspal separation  DOPPLER: Transaortic velocity was within the normal range  This was a  technically difficult study but there does not appear to be severe stenotic disease  There was trace regurgitation  TRICUSPID VALVE: The valve structure was normal  There was normal leaflet separation  DOPPLER: The transtricuspid velocity was within the normal range  There was no evidence for stenosis  There was trace regurgitation  The tricuspid jet  envelope definition was inadequate for estimation of RV systolic pressure  There are no indirect findings suggestive of moderate or severe pulmonary hypertension  PULMONIC VALVE: Leaflets exhibited normal thickness, no calcification, and normal cuspal separation  DOPPLER: The transpulmonic velocity was within the normal range  There was trace regurgitation  PERICARDIUM: There was no pericardial effusion  AORTA: The root exhibited normal size  SYSTEMIC VEINS: IVC: The inferior vena cava was normal in size and course   Respirophasic changes were normal     SYSTEM MEASUREMENT TABLES    2D  %FS: 28 32 %  Ao Diam: 2 88 cm  EDV(Teich): 98 19 ml  EF(Teich): 54 74 %  ESV(Teich): 44 44 ml  IVSd: 0 98 cm  LA Area: 28 3 cm2  LA Diam: 4 45 cm  LVEDV MOD A4C: 85 09 ml  LVEF MOD A4C: 63 6 %  LVESV MOD A4C: 30 97 ml  LVIDd: 4 62 cm  LVIDs: 3 31 cm  LVLd A4C: 7 57 cm  LVLs A4C: 5 8 cm  LVPWd: 0 99 cm  RA Area: 13 87 cm2  RVIDd: 3 34 cm  SV MOD A4C: 54 11 ml  SV(Teich): 53 75 ml    CW  AR Dec Callahan: 3 24 m/s2  AR Dec Time: 1441 28 ms  AR PHT: 417 97 ms  AR Vmax: 4 66 m/s  AR maxP 95 mmHg  TR Vmax: 3 79 m/s  TR maxP 36 mmHg    MM  TAPSE: 2 37 cm    PW  E' Sept: 0 08 m/s  E/E' Sept: 17 8  MV A Justen: 1 m/s  MV Dec Callahan: 7 09 m/s2  MV DecT: 227 76 ms  MV E Justen: 1 5 m/s  MV E/A Ratio: 1 5  MV PHT: 66 05 ms  MVA By PHT: 3 33 cm2    [employer] Accredited [facility]    Prepared and electronically signed by    Yonathan Del Castillo MD  Signed 2021 13:14:49         ======================================================    Lab Results   Component Value Date    WBC 6 44 03/10/2021    HGB 14 1 03/10/2021    HCT 43 2 03/10/2021    MCV 92 03/10/2021     03/10/2021      Lab Results   Component Value Date    SODIUM 139 03/10/2021    K 4 3 03/10/2021     03/10/2021    CO2 28 03/10/2021    BUN 21 03/10/2021    CREATININE 0 79 03/10/2021    GLUC 85 2016    CALCIUM 9 4 03/10/2021      No results found for: HGBA1C   Lab Results   Component Value Date    CHOL 178 2014     Lab Results   Component Value Date    HDL 51 03/10/2021    HDL 55 2020    HDL 57 2018     Lab Results   Component Value Date    LDLCALC 134 (H) 03/10/2021    LDLCALC 109 (H) 2020    LDLCALC 111 (H) 2018     Lab Results   Component Value Date    TRIG 65 03/10/2021    TRIG 64 2020    TRIG 64 2018     No results found for: CHOLHDL   No results found for: INR, PROTIME     Imaging:   I have personally reviewed pertinent reports  Portions of the record may have been created with voice recognition software   Occasional wrong word or "sound a like" substitutions may have occurred due to the inherent limitations of voice recognition software  Read the chart carefully and recognize, using context, where substitutions have occurred

## 2021-06-23 ENCOUNTER — HOSPITAL ENCOUNTER (OUTPATIENT)
Dept: RADIOLOGY | Facility: HOSPITAL | Age: 76
Discharge: HOME | End: 2021-06-23
Payer: MEDICARE

## 2021-06-23 ENCOUNTER — HOSPITAL ENCOUNTER (OUTPATIENT)
Dept: NON INVASIVE DIAGNOSTICS | Facility: HOSPITAL | Age: 76
Discharge: HOME | End: 2021-06-23
Payer: MEDICARE

## 2021-06-23 DIAGNOSIS — I20.9 ANGINA PECTORIS (HCC): ICD-10-CM

## 2021-06-23 LAB
CHEST PAIN STATEMENT: NORMAL
MAX DIASTOLIC BP: 50 MMHG
MAX HEART RATE: 142 BPM
MAX PREDICTED HEART RATE: 146 BPM
MAX. SYSTOLIC BP: 204 MMHG
PROTOCOL NAME: NORMAL
TARGET HR FORMULA: NORMAL
TEST INDICATION: NORMAL
TIME IN EXERCISE PHASE: NORMAL

## 2021-06-23 PROCEDURE — 78452 HT MUSCLE IMAGE SPECT MULT: CPT

## 2021-06-23 PROCEDURE — G1004 CDSM NDSC: HCPCS

## 2021-06-23 PROCEDURE — 93017 CV STRESS TEST TRACING ONLY: CPT

## 2021-06-23 PROCEDURE — A9502 TC99M TETROFOSMIN: HCPCS

## 2021-06-24 PROCEDURE — 93018 CV STRESS TEST I&R ONLY: CPT | Performed by: INTERNAL MEDICINE

## 2021-06-24 PROCEDURE — 93016 CV STRESS TEST SUPVJ ONLY: CPT | Performed by: INTERNAL MEDICINE

## 2021-06-24 PROCEDURE — 78452 HT MUSCLE IMAGE SPECT MULT: CPT | Performed by: INTERNAL MEDICINE

## 2021-07-03 NOTE — RESULT ENCOUNTER NOTE
Patient has normal left ventricular systolic function with grade 2 left ventricular diastolic dysfunction  There is mild-to-moderate left atrial enlargement with moderate mitral regurgitation  Patient had a nuclear stress test which demonstrated  a normal LVEF and normal perfusion scan

## 2021-07-07 ENCOUNTER — OFFICE VISIT (OUTPATIENT)
Dept: CARDIOLOGY CLINIC | Facility: CLINIC | Age: 76
End: 2021-07-07
Payer: MEDICARE

## 2021-07-07 VITALS
DIASTOLIC BLOOD PRESSURE: 68 MMHG | SYSTOLIC BLOOD PRESSURE: 114 MMHG | WEIGHT: 131 LBS | HEART RATE: 80 BPM | RESPIRATION RATE: 16 BRPM | HEIGHT: 64 IN | BODY MASS INDEX: 22.36 KG/M2

## 2021-07-07 DIAGNOSIS — I10 ESSENTIAL HYPERTENSION: ICD-10-CM

## 2021-07-07 DIAGNOSIS — E78.00 HYPERCHOLESTEROLEMIA: ICD-10-CM

## 2021-07-07 DIAGNOSIS — I34.0 NONRHEUMATIC MITRAL VALVE REGURGITATION: ICD-10-CM

## 2021-07-07 DIAGNOSIS — I20.9 ANGINA PECTORIS (HCC): Primary | ICD-10-CM

## 2021-07-07 PROCEDURE — 99214 OFFICE O/P EST MOD 30 MIN: CPT | Performed by: INTERNAL MEDICINE

## 2021-07-07 NOTE — LETTER
July 7, 2021     Lisa Comp, [address] [facility] [address]  [address] [facility]  [place]   [address]  [phone]    Patient: Erlin Dunaway   YOB: 1945   Date of Visit: 7/7/2021       Dear Dr Marilu Templeton: Thank you for referring Erlin Dunaway to me for evaluation  Below are my notes for this consultation  If you have questions, please do not hesitate to call me  I look forward to following your patient along with you  Sincerely,        Wang Bernardo MD        CC: No Recipients  Wang Bernardo MD  7/7/2021 10:34 AM  Sign when Signing Visit                                             Cardiology Follow Up    Erlin Dunaway  1945  [id number]  [address]  [address] [facility] [address] [phone]    1  Angina pectoris ([address] )     2  Hypercholesterolemia  Lipid Panel with Direct LDL reflex   3  Nonrheumatic mitral valve regurgitation     4  Essential hypertension         Patient was 1st seen on 05/27/2021  Patient is a is a 77-year-old female who developed symptoms of chest tightness radiating into her neck and jaw with shortness of breath on physical exertion such as going up a flight of stairs   The discomfort would melba with rest  Stacey Tran primary care physician ordered a stress echocardiogram which was nondiagnostic and equivocal for myocardial ischemia   Mild mitral regurgitation was noted on the echocardiogram   Patient states that since the stress test, her exertional chest tightness and shortness of breath has been much less severe   Patient has no family history of coronary artery disease   A sister has a murmur  Felipe Dies is a lifetime nonsmoker        03/10/2021 lipid profile: Cholesterol 198, triglycerides 65, HDL 51, LDL calculated 134, non HDL cholesterol 147  Interval History: Patient is having no further chest discomfort  She also denies  Dyspnea on exertion  She is able to do all the activities which she is used to doing    Her LDL has increased and presently is at 134  She believes the increase in her lipids are secondary to inactivity due to COVID and eating more cheese  She states that she has cut down in her cheese and is going for walks to increase her activity  She used to go to the gym but has not felt comfortable doing this yet  A resting echocardiogram demonstrated moderate mitral regurgitation  A nuclear stress test demonstrated no chest discomfort during exercise with good exercise tolerance for patient's age  She was hypertensive at the time of the stress test and had an exaggerated hypertensive response to exercise  She did have ST depression with exercise which may have been secondary to hypertension  LVEF was 77% and the perfusion scan was normal     Discussion/Summary:    1  Return in 6 months   2  Fasting lipid profile prior to return to see if her lipid values have improved with exercise and change in diet    Patient Active Problem List   Diagnosis    Generalized anxiety disorder    Diverticulosis    GERD without esophagitis    Hiatal hernia    Hypercholesterolemia    Essential hypertension    Irritable bowel syndrome    Malignant melanoma of skin ([address] )    Age-related osteoporosis without current pathological fracture    Squamous cell carcinoma of skin    Splenic artery aneurysm (HCC)    Other shoulder lesions, unspecified shoulder    Other specified disorders of rotator cuff syndrome of shoulder and allied disorders    Healthcare maintenance    Murmur, heart    Dysuria    Medicare annual wellness visit, subsequent    Hair loss    Bilateral impacted cerumen    SOBOE (shortness of breath on exertion)    Nonrheumatic mitral valve regurgitation    Angina pectoris ([address] )     History reviewed  No pertinent past medical history    Social History     Socioeconomic History    Marital status: /Civil Union     Spouse name: Not on file    Number of children: Not on file    Years of education: Not on file    Highest education level: Not on file   Occupational History    Occupation: retired    Tobacco Use    Smoking status: Never Smoker    Smokeless tobacco: Never Used    Tobacco comment: No secondhand smoke exposure    Substance and Sexual Activity    Alcohol use: No    Drug use: Not on file    Sexual activity: Not on file   Other Topics Concern    Not on file   Social History Narrative    Not on file     Social Determinants of Health     Financial Resource Strain:     Difficulty of Paying Living Expenses:    Food Insecurity:     Worried About [address] in the Last Year:     [address] [employer] in the Last Year:    Transportation Needs:     Lack of Transportation (Medical):  Lack of Transportation (Non-Medical):    Physical Activity:     Days of Exercise per Week:     Minutes of Exercise per Session:    Stress:     Feeling of Stress :    Social Connections:     Frequency of Communication with Friends and Family:     Frequency of Social Gatherings with Friends and Family:     Attends Gnosticism Services:     Active Member of Clubs or Organizations:     Attends Club or Organization Meetings:     Marital Status:    Intimate Partner Violence:     Fear of Current or Ex-Partner:     Emotionally Abused:     Physically Abused:     Sexually Abused:       Family History   Problem Relation Age of Onset    Cervical cancer Mother     Prostate cancer Father     Osteoporosis Sister      History reviewed  No pertinent surgical history      Current Outpatient Medications:     ALPRAZolam (XANAX) 0 25 mg tablet, Take 1 tablet by mouth daily as needed, Disp: , Rfl:     amLODIPine (NORVASC) 2 5 mg tablet, TAKE 1 TABLET BY MOUTH DAILY, Disp: 90 tablet, Rfl: 3    b complex vitamins capsule, Take 1 capsule by mouth daily, Disp: , Rfl:     BIOTIN PO, Take by mouth, Disp: , Rfl:     calcium carbonate (Calcium 600) 600 MG tablet, Take 600 mg by mouth 2 (two) times a day with meals, Disp: , Rfl:    Cholecalciferol (VITAMIN D3) 1000 units CAPS, Take 2 capsules by mouth daily, Disp: , Rfl:     omeprazole (PriLOSEC) 20 mg delayed release capsule, Take by mouth, Disp: , Rfl:     Vitamin Mixture ([place]-C) 500-60 MG TABS, Take by mouth, Disp: , Rfl:   Allergies   Allergen Reactions    Macrobid [Nitrofurantoin] GI Intolerance    Penicillins      Childhood       No results found for this visit on 07/07/21  Review of Systems:  Review of Systems   Respiratory: Negative for cough, choking, chest tightness, shortness of breath and wheezing  Cardiovascular: Negative for chest pain, palpitations and leg swelling  Musculoskeletal: Negative for gait problem  Skin: Negative for rash  Neurological: Negative for dizziness, tremors, syncope, weakness, light-headedness, numbness and headaches  Psychiatric/Behavioral: Negative for agitation and behavioral problems  The patient is not hyperactive  Physical Exam:  /68   Pulse 80   Resp 16   Ht 5' 3 5" (1 613 m)   Wt 59 4 kg (131 lb)   BMI 22 84 kg/m²   Physical Exam  Constitutional:       General: She is not in acute distress  Appearance: She is well-developed  HENT:      Head: Normocephalic and atraumatic  Neck:      Thyroid: No thyromegaly  Vascular: No carotid bruit or JVD  Trachea: No tracheal deviation  Cardiovascular:      Rate and Rhythm: Normal rate and regular rhythm  Pulses: Normal pulses  Heart sounds: Murmur heard  No friction rub  No gallop  Comments:  Grade 3/6 holosystolic murmur at apex radiating to axilla  Pulmonary:      Effort: Pulmonary effort is normal  No respiratory distress  Breath sounds: Normal breath sounds  No wheezing, rhonchi or rales  Chest:      Chest wall: No tenderness  Abdominal:      General: Bowel sounds are normal  There is no distension  Palpations: Abdomen is soft  Tenderness: There is no abdominal tenderness     Musculoskeletal:         General: Normal range of motion  Cervical back: Normal range of motion and neck supple  Right lower leg: No edema  Left lower leg: No edema  Skin:     General: Skin is warm and dry  Neurological:      General: No focal deficit present  Mental Status: She is alert and oriented to person, place, and time  Gait: Gait normal    Psychiatric:         Mood and Affect: Mood normal          Behavior: Behavior normal          Thought Content: Thought content normal          Judgment: Judgment normal          ----------------------------------------------------------------------------------------------  NUCLEAR STRESS TEST: Results for orders placed during the hospital encounter of 21    NM myocardial perfusion spect (stress and/or rest)    [place] [address] [employer] [address]    Stress Gated SPECT Myocardial Perfusion Imaging after Exercise    Patient: Dl Pederson  MR number: [id number]  Account number: [de-identified]  : 1945  Age: 78 years  Gender: Female  Status: Outpatient  Location: [address] [place] [address]  Height: 64 in  Weight: 131 lb  BP: 154/ 68 mmHg    Diagnosis: R06 00 - Dyspnea, unspecified, R07 9 - Chest pain, unspecified, R94 39 - Abnormal result of other cardiovascular function study    Primary Physician:  Salima Chou [facility]  RN:  Mariia Dominguez RN  Referring Physician:  Kelli Perez MD  Technician:  Casandra Carver:  [facility]  Report Prepared By[de-identified]  Mariia Dominguez RN  Interpreting Physician:  Kelli Perez MD    INDICATIONS: Detection of coronary artery disease  HISTORY: The patient is a 78year old  female  Chest pain status: chest/ throat discomfort  Other symptoms: dyspnea  Coronary artery disease risk factors: dyslipidemia, hypertension, and post-menopausal state  Cardiovascular  history: mitral valve regurgitation  Medications: a calcium channel blocker   Previous test results: equivocal stress echo(4/28/2021)    PHYSICAL EXAM: Baseline physical exam screening: normal lung exam, audible heart murmur    REST ECG: Normal sinus rhythm at a rate of 70 beats per minute  Diffuse nonspecific ST T wave changes  Abnormal EKG  PROCEDURE: The study was performed in the [facility]  Treadmill exercise testing was performed, using the Rex protocol  Gated SPECT myocardial perfusion imaging was performed during stress  Systolic blood pressure was 154  mmHg, at the start of the study  Diastolic blood pressure was 68 mmHg, at the start of the study  The heart rate was 77 bpm, at the start of the study  IV double checked  REX PROTOCOL:  HR bpm SBP mmHg DBP mmHg Symptoms  Baseline 77 154 68 none  Stage 1 108 190 58 none  Stage 2 142 194 58 fatigue  Immediate 134 204 50 same as above  Recovery 1 121 -- -- subsiding  Recovery 2 99 160 60 none  Recovery 3 82 140 62 none  No medications or fluids given  STRESS SUMMARY: Duration of exercise was 6 min and 0 sec  The patient exercised to protocol stage 2  Maximal work rate was 7 METs  Maximal heart rate during stress was 142 bpm ( 97 % of maximal predicted heart rate)  The rate-pressure  product for the peak heart rate and blood pressure was 35629  There was no chest pain during stress  The stress test was terminated due to achievement of maximal (symptom limited) exercise  Pre oxygen saturation: 100 %  Peak oxygen  saturation: 99 %  With exercise, there was 1 5-1 8 mm of downsloping ST depression in leads 2, 3, and AVF  In addition there was 1 4-1 8 mm of ST depression in leads V4 through V6  There were occasional premature ventricular contractions  with occasional ventricular couplets  There were occasional premature atrial contractions  ISOTOPE ADMINISTRATION:  The radiopharmaceutical was injected one minute before the end of exercise      MYOCARDIAL PERFUSION IMAGING:  Tomographic perfusion series at rest demonstrated uniformly normal uptake in activity  Following graded treadmill exercise, there was no change  GATED SPECT:  Normal left ventricular systolic function, EF 72%  Normal left ventricular cavity size  Normal wall motion without regional wall motion abnormalities  SUMMARY:  -  Stress results: Duration of exercise was 6 min and 0 sec  Target heart rate was achieved  There was no chest pain during stress  IMPRESSIONS: 1  Good exercise tolerance  2  Resting hypertension with exaggerated hypertensive response to exercise  3  graded treadmill exercise test negative for symptoms of exertional angina pectoris but with abnormal ST depression with exercise  4  Ventricular ectopy as described during exercise  5  Normal left ventricular systolic function, EF 00%  6  normal tomographic perfusion series    Prepared and signed by    Neil Kehr, MD  Signed 2021 07:41:58    -------------------  ECHO:   Results for orders placed during the hospital encounter of 21    Echo complete with contrast if indicated    [address]  [address] [facility] [place], [facility], [address] [place] [address]  [phone]    Transthoracic Echocardiogram  2D, M-mode, Doppler, and Color Doppler    Study date:  2021    Patient: Whit Katz  MR number: [id number]  Account number: [de-identified]  : 1945  Age: 78 years  Gender: Female  Status: Outpatient  Location: Echo lab  Height: 64 in  Weight: 130 lb  BP: 148/ 76 mmHg    Indications: SOB  Diagnoses: R06 02 - Shortness of breath    Sonographer:  MG Elmore  Interpreting Physician:  Mike Chaudhari MD  Primary Physician:  Britt Treviño [facility]  Referring Physician:  Neil Kehr, MD  Group:  [facility]  Cardiology Fellow:  Alex Yap DO    SUMMARY    PROCEDURE INFORMATION:  This was a technically difficult study      LEFT VENTRICLE:  Size was normal   Systolic function was normal  Ejection fraction was estimated to be 60 %   There was mild concentric hypertrophy  Features were consistent with a pseudonormal left ventricular filling pattern, with concomitant abnormal relaxation and increased filling pressure (grade 2 diastolic dysfunction)  RIGHT VENTRICLE:  The size was normal   Systolic function was normal     LEFT ATRIUM:  The atrium was mildly to moderately dilated  MITRAL VALVE:  There was moderate regurgitation  AORTIC VALVE:  There was trace regurgitation  TRICUSPID VALVE:  There was trace regurgitation  HISTORY: PRIOR HISTORY: MR;SOB;HTN;HLD  PROCEDURE: The procedure was performed in the echo lab  This was a routine study  The transthoracic approach was used  The study included complete 2D imaging, M-mode, complete spectral Doppler, and color Doppler  The heart rate was 83 bpm,  at the start of the study  Images were obtained from the parasternal, apical, subcostal, and suprasternal notch acoustic windows  This was a technically difficult study  LEFT VENTRICLE: Size was normal  Systolic function was normal  Ejection fraction was estimated to be 60 %  Although no diagnostic regional wall motion abnormality was identified, this possibility cannot be completely excluded on the basis  of this study  Wall thickness was at the upper limits of normal  There was mild concentric hypertrophy  DOPPLER: Features were consistent with a pseudonormal left ventricular filling pattern, with concomitant abnormal relaxation and  increased filling pressure (grade 2 diastolic dysfunction)  RIGHT VENTRICLE: The size was normal  Systolic function was normal     LEFT ATRIUM: The atrium was mildly to moderately dilated  RIGHT ATRIUM: Size was normal     MITRAL VALVE: There was moderate to marked annular calcification  There was normal leaflet separation  DOPPLER: The transmitral velocity was within the normal range  There was no evidence for stenosis  There was moderate regurgitation      AORTIC VALVE: The valve was probably trileaflet  Leaflets exhibited mildly to moderately increased thickness, mild calcification, and normal cuspal separation  DOPPLER: Transaortic velocity was within the normal range  This was a  technically difficult study but there does not appear to be severe stenotic disease  There was trace regurgitation  TRICUSPID VALVE: The valve structure was normal  There was normal leaflet separation  DOPPLER: The transtricuspid velocity was within the normal range  There was no evidence for stenosis  There was trace regurgitation  The tricuspid jet  envelope definition was inadequate for estimation of RV systolic pressure  There are no indirect findings suggestive of moderate or severe pulmonary hypertension  PULMONIC VALVE: Leaflets exhibited normal thickness, no calcification, and normal cuspal separation  DOPPLER: The transpulmonic velocity was within the normal range  There was trace regurgitation  PERICARDIUM: There was no pericardial effusion  AORTA: The root exhibited normal size  SYSTEMIC VEINS: IVC: The inferior vena cava was normal in size and course   Respirophasic changes were normal     SYSTEM MEASUREMENT TABLES    2D  %FS: 28 32 %  Ao Diam: 2 88 cm  EDV(Teich): 98 19 ml  EF(Teich): 54 74 %  ESV(Teich): 44 44 ml  IVSd: 0 98 cm  LA Area: 28 3 cm2  LA Diam: 4 45 cm  LVEDV MOD A4C: 85 09 ml  LVEF MOD A4C: 63 6 %  LVESV MOD A4C: 30 97 ml  LVIDd: 4 62 cm  LVIDs: 3 31 cm  LVLd A4C: 7 57 cm  LVLs A4C: 5 8 cm  LVPWd: 0 99 cm  RA Area: 13 87 cm2  RVIDd: 3 34 cm  SV MOD A4C: 54 11 ml  SV(Teich): 53 75 ml    CW  AR Dec [place]: 3 24 m/s2  AR Dec Time: 1441 28 ms  AR PHT: 417 97 ms  AR Vmax: 4 66 m/s  AR maxP 95 mmHg  TR Vmax: 3 79 m/s  TR maxP 36 mmHg    MM  TAPSE: 2 37 cm    PW  E' Sept: 0 08 m/s  E/E' Sept: 17 8  MV A Justen: 1 m/s  MV Dec [place]: 7 09 m/s2  MV DecT: 227 76 ms  MV E Justen: 1 5 m/s  MV E/A Ratio: 1 5  MV PHT: 66 05 ms  MVA By PHT: 3 33 cm2    [facility]    Prepared and electronically signed by    Berhane Mcclain MD  Signed 01-Jun-2021 13:14:49         ======================================================    Lab Results   Component Value Date    WBC 6 44 03/10/2021    HGB 14 1 03/10/2021    HCT 43 2 03/10/2021    MCV 92 03/10/2021     03/10/2021      Lab Results   Component Value Date    SODIUM 139 03/10/2021    K 4 3 03/10/2021     03/10/2021    CO2 28 03/10/2021    BUN 21 03/10/2021    CREATININE 0 79 03/10/2021    GLUC 85 06/20/2016    CALCIUM 9 4 03/10/2021      No results found for: HGBA1C   Lab Results   Component Value Date    CHOL 178 05/07/2014     Lab Results   Component Value Date    HDL 51 03/10/2021    HDL 55 03/03/2020    HDL 57 09/19/2018     Lab Results   Component Value Date    LDLCALC 134 (H) 03/10/2021    LDLCALC 109 (H) 03/03/2020    LDLCALC 111 (H) 09/19/2018     Lab Results   Component Value Date    TRIG 65 03/10/2021    TRIG 64 03/03/2020    TRIG 64 09/19/2018         Imaging:   I have personally reviewed pertinent reports  Portions of the record may have been created with voice recognition software  Occasional wrong word or "sound a like" substitutions may have occurred due to the inherent limitations of voice recognition software  Read the chart carefully and recognize, using context, where substitutions have occurred

## 2021-07-07 NOTE — PROGRESS NOTES
Cardiology Follow Up    Martin Urbina  1945  [id number]  [address] [facility] [address]  [phone]    1  Angina pectoris ([address] )     2  Hypercholesterolemia  Lipid Panel with Direct LDL reflex   3  Nonrheumatic mitral valve regurgitation     4  Essential hypertension         Patient was 1st seen on 05/27/2021  Patient is a is a 82-year-old female who developed symptoms of chest tightness radiating into her neck and jaw with shortness of breath on physical exertion such as going up a flight of stairs   The discomfort would melba with rest  Samuel Carrington primary care physician ordered a stress echocardiogram which was nondiagnostic and equivocal for myocardial ischemia   Mild mitral regurgitation was noted on the echocardiogram   Patient states that since the stress test, her exertional chest tightness and shortness of breath has been much less severe   Patient has no family history of coronary artery disease   A sister has a murmur  Mingo Chou is a lifetime nonsmoker        03/10/2021 lipid profile: Cholesterol 198, triglycerides 65, HDL 51, LDL calculated 134, non HDL cholesterol 147  Interval History: Patient is having no further chest discomfort  She also denies  Dyspnea on exertion  She is able to do all the activities which she is used to doing  Her LDL has increased and presently is at 134  She believes the increase in her lipids are secondary to inactivity due to COVID and eating more cheese  She states that she has cut down in her cheese and is going for walks to increase her activity  She used to go to the gym but has not felt comfortable doing this yet  A resting echocardiogram demonstrated moderate mitral regurgitation  A nuclear stress test demonstrated no chest discomfort during exercise with good exercise tolerance for patient's age    She was hypertensive at the time of the stress test and had an exaggerated hypertensive response to exercise  She did have ST depression with exercise which may have been secondary to hypertension  LVEF was 77% and the perfusion scan was normal     Discussion/Summary:    1  Return in 6 months   2  Fasting lipid profile prior to return to see if her lipid values have improved with exercise and change in diet    Patient Active Problem List   Diagnosis    Generalized anxiety disorder    Diverticulosis    GERD without esophagitis    Hiatal hernia    Hypercholesterolemia    Essential hypertension    Irritable bowel syndrome    Malignant melanoma of skin ([facility] )    Age-related osteoporosis without current pathological fracture    Squamous cell carcinoma of skin    Splenic artery aneurysm (HCC)    Other shoulder lesions, unspecified shoulder    Other specified disorders of rotator cuff syndrome of shoulder and allied disorders    Healthcare maintenance    Murmur, heart    Dysuria    Medicare annual wellness visit, subsequent    Hair loss    Bilateral impacted cerumen    SOBOE (shortness of breath on exertion)    Nonrheumatic mitral valve regurgitation    Angina pectoris ([facility] )     History reviewed  No pertinent past medical history    Social History     Socioeconomic History    Marital status: /Civil Union     Spouse name: Not on file    Number of children: Not on file    Years of education: Not on file    Highest education level: Not on file   Occupational History    Occupation: retired    Tobacco Use    Smoking status: Never Smoker    Smokeless tobacco: Never Used    Tobacco comment: No secondhand smoke exposure    Substance and Sexual Activity    Alcohol use: No    Drug use: Not on file    Sexual activity: Not on file   Other Topics Concern    Not on file   Social History Narrative    Not on file     Social Determinants of Health     Financial Resource Strain:     Difficulty of Paying Living Expenses:    Food Insecurity:     Worried About Running Out of Food in the Last Year:    [address] in the Last Year:    Transportation Needs:     Lack of Transportation (Medical):  Lack of Transportation (Non-Medical):    Physical Activity:     Days of Exercise per Week:     Minutes of Exercise per Session:    Stress:     Feeling of Stress :    Social Connections:     Frequency of Communication with Friends and Family:     Frequency of Social Gatherings with Friends and Family:     Attends Confucianism Services:     Active Member of Clubs or Organizations:     Attends Club or Organization Meetings:     Marital Status:    Intimate Partner Violence:     Fear of Current or Ex-Partner:     Emotionally Abused:     Physically Abused:     Sexually Abused:       Family History   Problem Relation Age of Onset    Cervical cancer Mother     Prostate cancer Father     Osteoporosis Sister      History reviewed  No pertinent surgical history  Current Outpatient Medications:     ALPRAZolam (XANAX) 0 25 mg tablet, Take 1 tablet by mouth daily as needed, Disp: , Rfl:     amLODIPine (NORVASC) 2 5 mg tablet, TAKE 1 TABLET BY MOUTH DAILY, Disp: 90 tablet, Rfl: 3    b complex vitamins capsule, Take 1 capsule by mouth daily, Disp: , Rfl:     BIOTIN PO, Take by mouth, Disp: , Rfl:     calcium carbonate (Calcium 600) 600 MG tablet, Take 600 mg by mouth 2 (two) times a day with meals, Disp: , Rfl:     Cholecalciferol (VITAMIN D3) 1000 units CAPS, Take 2 capsules by mouth daily, Disp: , Rfl:     omeprazole (PriLOSEC) 20 mg delayed release capsule, Take by mouth, Disp: , Rfl:     Vitamin Mixture ([place]-C) 500-60 MG TABS, Take by mouth, Disp: , Rfl:   Allergies   Allergen Reactions    Macrobid [Nitrofurantoin] GI Intolerance    Penicillins      Childhood       No results found for this visit on 07/07/21  Review of Systems:  Review of Systems   Respiratory: Negative for cough, choking, chest tightness, shortness of breath and wheezing  Cardiovascular: Negative for chest pain, palpitations and leg swelling  Musculoskeletal: Negative for gait problem  Skin: Negative for rash  Neurological: Negative for dizziness, tremors, syncope, weakness, light-headedness, numbness and headaches  Psychiatric/Behavioral: Negative for agitation and behavioral problems  The patient is not hyperactive  Physical Exam:  /68   Pulse 80   Resp 16   Ht 5' 3 5" (1 613 m)   Wt 59 4 kg (131 lb)   BMI 22 84 kg/m²   Physical Exam  Constitutional:       General: She is not in acute distress  Appearance: She is well-developed  HENT:      Head: Normocephalic and atraumatic  Neck:      Thyroid: No thyromegaly  Vascular: No carotid bruit or JVD  Trachea: No tracheal deviation  Cardiovascular:      Rate and Rhythm: Normal rate and regular rhythm  Pulses: Normal pulses  Heart sounds: Murmur heard  No friction rub  No gallop  Comments:  Grade 3/6 holosystolic murmur at apex radiating to axilla  Pulmonary:      Effort: Pulmonary effort is normal  No respiratory distress  Breath sounds: Normal breath sounds  No wheezing, rhonchi or rales  Chest:      Chest wall: No tenderness  Abdominal:      General: Bowel sounds are normal  There is no distension  Palpations: Abdomen is soft  Tenderness: There is no abdominal tenderness  Musculoskeletal:         General: Normal range of motion  Cervical back: Normal range of motion and neck supple  Right lower leg: No edema  Left lower leg: No edema  Skin:     General: Skin is warm and dry  Neurological:      General: No focal deficit present  Mental Status: She is alert and oriented to person, place, and time  Gait: Gait normal    Psychiatric:         Mood and Affect: Mood normal          Behavior: Behavior normal          Thought Content:  Thought content normal          Judgment: Judgment normal  ----------------------------------------------------------------------------------------------  NUCLEAR STRESS TEST: Results for orders placed during the hospital encounter of 21    NM myocardial perfusion spect (stress and/or rest)    [place] [address] [address]    Stress Gated SPECT Myocardial Perfusion Imaging after Exercise    Patient: Dejan Paz  MR number: [id number]  Account number: [de-identified]  : 1945  Age: 78 years  Gender: Female  Status: Outpatient  Location: [address] [place] [address]  Height: 64 in  Weight: 131 lb  BP: 154/ 68 mmHg    Diagnosis: R06 00 - Dyspnea, unspecified, R07 9 - Chest pain, unspecified, R94 39 - Abnormal result of other cardiovascular function study    Primary Physician:  Temitope Wilson [facility]  RN:  Xander Knight RN  Referring Physician:  Jose Vora MD  Technician:  Radha Sosa:  [facility]  Report Prepared By[de-identified]  Xander Knight RN  Interpreting Physician:  Jose Vora MD    INDICATIONS: Detection of coronary artery disease  HISTORY: The patient is a 78year old  female  Chest pain status: chest/ throat discomfort  Other symptoms: dyspnea  Coronary artery disease risk factors: dyslipidemia, hypertension, and post-menopausal state  Cardiovascular  history: mitral valve regurgitation  Medications: a calcium channel blocker  Previous test results: equivocal stress echo(2021)    PHYSICAL EXAM: Baseline physical exam screening: normal lung exam, audible heart murmur    REST ECG: Normal sinus rhythm at a rate of 70 beats per minute  Diffuse nonspecific ST T wave changes  Abnormal EKG  PROCEDURE: The study was performed in the the [facility]  Treadmill exercise testing was performed, using the Best protocol  Gated SPECT myocardial perfusion imaging was performed during stress   Systolic blood pressure was 154  mmHg, at the start of the study  Diastolic blood pressure was 68 mmHg, at the start of the study  The heart rate was 77 bpm, at the start of the study  IV double checked  REX PROTOCOL:  HR bpm SBP mmHg DBP mmHg Symptoms  Baseline 77 154 68 none  Stage 1 108 190 58 none  Stage 2 142 194 58 fatigue  Immediate 134 204 50 same as above  Recovery 1 121 -- -- subsiding  Recovery 2 99 160 60 none  Recovery 3 82 140 62 none  No medications or fluids given  STRESS SUMMARY: Duration of exercise was 6 min and 0 sec  The patient exercised to protocol stage 2  Maximal work rate was 7 METs  Maximal heart rate during stress was 142 bpm ( 97 % of maximal predicted heart rate)  The rate-pressure  product for the peak heart rate and blood pressure was 42807  There was no chest pain during stress  The stress test was terminated due to achievement of maximal (symptom limited) exercise  Pre oxygen saturation: 100 %  Peak oxygen  saturation: 99 %  With exercise, there was 1 5-1 8 mm of downsloping ST depression in leads 2, 3, and AVF  In addition there was 1 4-1 8 mm of ST depression in leads V4 through V6  There were occasional premature ventricular contractions  with occasional ventricular couplets  There were occasional premature atrial contractions  ISOTOPE ADMINISTRATION:  The radiopharmaceutical was injected one minute before the end of exercise  MYOCARDIAL PERFUSION IMAGING:  Tomographic perfusion series at rest demonstrated uniformly normal uptake in activity  Following graded treadmill exercise, there was no change  GATED SPECT:  Normal left ventricular systolic function, EF 11%  Normal left ventricular cavity size  Normal wall motion without regional wall motion abnormalities  SUMMARY:  -  Stress results: Duration of exercise was 6 min and 0 sec  Target heart rate was achieved  There was no chest pain during stress  IMPRESSIONS: 1  Good exercise tolerance  2   Resting hypertension with exaggerated hypertensive response to exercise  3  graded treadmill exercise test negative for symptoms of exertional angina pectoris but with abnormal ST depression with exercise  4  Ventricular ectopy as described during exercise  5  Normal left ventricular systolic function, EF 43%  6  normal tomographic perfusion series    Prepared and signed by    Desean Smith MD  Signed 2021 07:41:58    -------------------  ECHO:   Results for orders placed during the hospital encounter of 21    Echo complete with contrast if indicated    Narrative  [facility] [address] [place] [address]  [phone]    Transthoracic Echocardiogram  2D, M-mode, Doppler, and Color Doppler    Study date:  2021    Patient: Raza Thompson  MR number: [id number]  Account number: [de-identified]  : 1945  Age: 78 years  Gender: Female  Status: Outpatient  Location: Echo lab  Height: 64 in  Weight: 130 lb  BP: 148/ 76 mmHg    Indications: SOB  Diagnoses: R06 02 - Shortness of breath    Sonographer:  MG Gonzáles  Interpreting Physician:  Carlos Fischer MD  Primary Physician:  [facility]  Referring Physician:  Desean Smith MD  Group:  [facility]  Cardiology Fellow:  Agnes Sandhoff, DO    SUMMARY    PROCEDURE INFORMATION:  This was a technically difficult study  LEFT VENTRICLE:  Size was normal   Systolic function was normal  Ejection fraction was estimated to be 60 %  There was mild concentric hypertrophy  Features were consistent with a pseudonormal left ventricular filling pattern, with concomitant abnormal relaxation and increased filling pressure (grade 2 diastolic dysfunction)  RIGHT VENTRICLE:  The size was normal   Systolic function was normal     LEFT ATRIUM:  The atrium was mildly to moderately dilated  MITRAL VALVE:  There was moderate regurgitation  AORTIC VALVE:  There was trace regurgitation      TRICUSPID VALVE:  There was trace regurgitation  HISTORY: PRIOR HISTORY: MR;SOB;HTN;HLD  PROCEDURE: The procedure was performed in the echo lab  This was a routine study  The transthoracic approach was used  The study included complete 2D imaging, M-mode, complete spectral Doppler, and color Doppler  The heart rate was 83 bpm,  at the start of the study  Images were obtained from the parasternal, apical, subcostal, and suprasternal notch acoustic windows  This was a technically difficult study  LEFT VENTRICLE: Size was normal  Systolic function was normal  Ejection fraction was estimated to be 60 %  Although no diagnostic regional wall motion abnormality was identified, this possibility cannot be completely excluded on the basis  of this study  Wall thickness was at the upper limits of normal  There was mild concentric hypertrophy  DOPPLER: Features were consistent with a pseudonormal left ventricular filling pattern, with concomitant abnormal relaxation and  increased filling pressure (grade 2 diastolic dysfunction)  RIGHT VENTRICLE: The size was normal  Systolic function was normal     LEFT ATRIUM: The atrium was mildly to moderately dilated  RIGHT ATRIUM: Size was normal     MITRAL VALVE: There was moderate to marked annular calcification  There was normal leaflet separation  DOPPLER: The transmitral velocity was within the normal range  There was no evidence for stenosis  There was moderate regurgitation  AORTIC VALVE: The valve was probably trileaflet  Leaflets exhibited mildly to moderately increased thickness, mild calcification, and normal cuspal separation  DOPPLER: Transaortic velocity was within the normal range  This was a  technically difficult study but there does not appear to be severe stenotic disease  There was trace regurgitation  TRICUSPID VALVE: The valve structure was normal  There was normal leaflet separation  DOPPLER: The transtricuspid velocity was within the normal range   There was no evidence for stenosis  There was trace regurgitation  The tricuspid jet  envelope definition was inadequate for estimation of RV systolic pressure  There are no indirect findings suggestive of moderate or severe pulmonary hypertension  PULMONIC VALVE: Leaflets exhibited normal thickness, no calcification, and normal cuspal separation  DOPPLER: The transpulmonic velocity was within the normal range  There was trace regurgitation  PERICARDIUM: There was no pericardial effusion  AORTA: The root exhibited normal size  SYSTEMIC VEINS: IVC: The inferior vena cava was normal in size and course   Respirophasic changes were normal     SYSTEM MEASUREMENT TABLES    2D  %FS: 28 32 %  Ao Diam: 2 88 cm  EDV(Teich): 98 19 ml  EF(Teich): 54 74 %  ESV(Teich): 44 44 ml  IVSd: 0 98 cm  LA Area: 28 3 cm2  LA Diam: 4 45 cm  LVEDV MOD A4C: 85 09 ml  LVEF MOD A4C: 63 6 %  LVESV MOD A4C: 30 97 ml  LVIDd: 4 62 cm  LVIDs: 3 31 cm  LVLd A4C: 7 57 cm  LVLs A4C: 5 8 cm  LVPWd: 0 99 cm  RA Area: 13 87 cm2  RVIDd: 3 34 cm  SV MOD A4C: 54 11 ml  SV(Teich): 53 75 ml    CW  AR Dec [place]: 3 24 m/s2  AR Dec Time: 1441 28 ms  AR PHT: 417 97 ms  AR Vmax: 4 66 m/s  AR maxP 95 mmHg  TR Vmax: 3 79 m/s  TR maxP 36 mmHg    MM  TAPSE: 2 37 cm    PW  E' Sept: 0 08 m/s  E/E' Sept: 17 8  MV A Justen: 1 m/s  MV Dec [place]: 7 09 m/s2  MV DecT: 227 76 ms  MV E Justen: 1 5 m/s  MV E/A Ratio: 1 5  MV PHT: 66 05 ms  MVA By PHT: 3 33 cm2    [facility] Accredited [facility]ch[facility]    Prepared and electronically signed by    Melanie Nichols MD  Signed 2021 13:14:49         ======================================================    Lab Results   Component Value Date    WBC 6 44 03/10/2021    HGB 14 1 03/10/2021    HCT 43 2 03/10/2021    MCV 92 03/10/2021     03/10/2021      Lab Results   Component Value Date    SODIUM 139 03/10/2021    K 4 3 03/10/2021     03/10/2021    CO2 28 03/10/2021    BUN 21 03/10/2021    CREATININE 0 79 03/10/2021    GLUC 85 06/20/2016    CALCIUM 9 4 03/10/2021      No results found for: HGBA1C   Lab Results   Component Value Date    CHOL 178 05/07/2014     Lab Results   Component Value Date    HDL 51 03/10/2021    HDL 55 03/03/2020    HDL 57 09/19/2018     Lab Results   Component Value Date    LDLCALC 134 (H) 03/10/2021    LDLCALC 109 (H) 03/03/2020    LDLCALC 111 (H) 09/19/2018     Lab Results   Component Value Date    TRIG 65 03/10/2021    TRIG 64 03/03/2020    TRIG 64 09/19/2018         Imaging:   I have personally reviewed pertinent reports  Portions of the record may have been created with voice recognition software  Occasional wrong word or "sound a like" substitutions may have occurred due to the inherent limitations of voice recognition software  Read the chart carefully and recognize, using context, where substitutions have occurred

## 2021-07-26 ENCOUNTER — TELEPHONE (OUTPATIENT)
Dept: ADMINISTRATIVE | Facility: OTHER | Age: 76
End: 2021-07-26

## 2021-07-26 NOTE — TELEPHONE ENCOUNTER
Upon review of the In Basket request we were able to locate, review, and update the patient chart as requested for Mammogram     Any additional questions or concerns should be emailed to the Practice Liaisons via [email or web address] email, please do not reply via In Basket      Thank you  Lachelle Henson

## 2021-07-26 NOTE — TELEPHONE ENCOUNTER
----- Message from [address] sent at 7/26/2021  9:05 AM EDT -----  Regarding: [facility]  07/26/21 9:05 AM    Hello, our patient Matilde Cordoba has had Mammogram completed/performed  Please assist in updating the patient chart by pulling the Care Everywhere (CE) document  The date of service is 7/23/2021       Thank you,  Arelis Sanchez  [facility] PRIMARY [facility]

## 2021-12-20 ENCOUNTER — TELEPHONE (OUTPATIENT)
Dept: FAMILY MEDICINE CLINIC | Facility: CLINIC | Age: 76
End: 2021-12-20

## 2021-12-21 ENCOUNTER — OFFICE VISIT (OUTPATIENT)
Dept: FAMILY MEDICINE CLINIC | Facility: CLINIC | Age: 76
End: 2021-12-21
Payer: MEDICARE
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
## 2021-12-21 DIAGNOSIS — S40.861A TICK BITE OF RIGHT UPPER ARM, INITIAL ENCOUNTER: Primary | ICD-10-CM

## 2021-12-21 PROCEDURE — 99213 OFFICE O/P EST LOW 20 MIN: CPT | Performed by: PHYSICIAN ASSISTANT

## 2022-02-10 ENCOUNTER — PREP FOR PROCEDURE (OUTPATIENT)
Dept: CARDIOLOGY CLINIC | Facility: CLINIC | Age: 77
End: 2022-02-10

## 2022-02-10 ENCOUNTER — OFFICE VISIT (OUTPATIENT)
Dept: CARDIOLOGY CLINIC | Facility: CLINIC | Age: 77
End: 2022-02-10
Payer: MEDICARE

## 2022-02-10 VITALS
HEIGHT: 63 IN | SYSTOLIC BLOOD PRESSURE: 126 MMHG | BODY MASS INDEX: 22.93 KG/M2 | RESPIRATION RATE: 16 BRPM | DIASTOLIC BLOOD PRESSURE: 72 MMHG | WEIGHT: 129.4 LBS | HEART RATE: 84 BPM

## 2022-02-10 DIAGNOSIS — I20.9 ANGINA PECTORIS (HCC): Primary | ICD-10-CM

## 2022-02-10 DIAGNOSIS — I34.0 NONRHEUMATIC MITRAL VALVE REGURGITATION: ICD-10-CM

## 2022-02-10 DIAGNOSIS — E78.00 HYPERCHOLESTEROLEMIA: ICD-10-CM

## 2022-02-10 DIAGNOSIS — I72.8 SPLENIC ARTERY ANEURYSM (HCC): ICD-10-CM

## 2022-02-10 DIAGNOSIS — I10 ESSENTIAL HYPERTENSION: ICD-10-CM

## 2022-02-10 PROCEDURE — 99215 OFFICE O/P EST HI 40 MIN: CPT | Performed by: INTERNAL MEDICINE

## 2022-02-10 RX ORDER — SODIUM CHLORIDE 9 MG/ML
125 INJECTION, SOLUTION INTRAVENOUS CONTINUOUS
Status: CANCELLED | OUTPATIENT
Start: 2022-02-10

## 2022-02-10 NOTE — H&P
[facility]  [address] [facility]   [address]  [address]  Phone#  [phone]   Fax#  [phone]  *-*-*-*-*-*-*-*-*-*-*-*-*-*-*-*-*-*-*-*-*-*-*-*-*-*-*-*-*-*-*-*-*-*-*-*-*-*-*-*-*-*-*-*-*-*-*-*-*-*-*-*-*-*                                   Cardiology Follow Up        ENCOUNTER DATE: 02/10/22 9:15 AM  PATIENT NAME: Quynh Maddox   : 1945    MRN: [id number]  AGE:77 y o  SEX: female  [address] Don[address]on Payne MD     PRIMARY CARE PHYSICIAN: Diane Mejia PA-C     ACTIVE DIAGNOSIS THIS VISIT  1  Angina pectoris ([address] )      2  Nonrheumatic mitral valve regurgitation      3  Hypercholesterolemia      4  Essential hypertension         ACTIVE PROBLEM LIST      Patient Active Problem List   Diagnosis    Generalized anxiety disorder    Diverticulosis    GERD without esophagitis    Hiatal hernia    Hypercholesterolemia    Essential hypertension    Irritable bowel syndrome    Malignant melanoma of skin ([address] )    Age-related osteoporosis without current pathological fracture    Squamous cell carcinoma of skin    Splenic artery aneurysm (HCC)    Other shoulder lesions, unspecified shoulder    Other specified disorders of rotator cuff syndrome of shoulder and allied disorders    Healthcare maintenance    Murmur, heart    Dysuria    Medicare annual wellness visit, subsequent    Hair loss    Bilateral impacted cerumen    SOBOE (shortness of breath on exertion)    Nonrheumatic mitral valve regurgitation    Angina pectoris ([address] )    Tick bite         CARDIOLOGY SPECIALTY COMMENTS  Patient was 1st seen on 2021   Patient is a is a 82-year-old female who developed symptoms of chest tightness radiating into her neck and jaw with shortness of breath on physical exertion such as going up a flight of stairs   The discomfort would melba with rest  Noe Brunner primary care physician ordered a stress echocardiogram which was nondiagnostic and equivocal for myocardial ischemia  [facility] mitral regurgitation was noted on the echocardiogram   Patient states that since the stress test, her exertional chest tightness and shortness of breath has been much less severe   Patient has no family history of coronary artery disease   A sister has a murmur  Gildardo Lundberg is a lifetime nonsmoker      INTERVAL HISTORY:         Patient states that her chest discomfort has returned  It is suggestive of angina pectoris  When she walks the dog outside, when she gets to a top of the hill, she develops a heaviness in her chest   She continues to walk but she is on the for flat and the discomfort abates  When she gets home, she goes up a flight of stairs and at the top of the stairs she again gets the heaviness but it abates in approximately 1 minute  She has no associated symptoms  Other types of exercise which are of shorter duration do not results in discomfort        She has at least moderate mitral regurgitation on examination      DISCUSSION/PLAN:             1  Recommend cardiac catheterization  Patient is agreeable   2  Patient was to have a lipid profile but delayed it to have it done when her primary care physician asked for her to have the lipid profile performed before she return for her primary care visit  Patient has been trying to treat her cholesterol with diet      3  Return in 4 weeks         Current Outpatient Medications:     ALPRAZolam (XANAX) 0 25 mg tablet, Take 1 tablet by mouth daily as needed, Disp: , Rfl:     amLODIPine (NORVASC) 2 5 mg tablet, TAKE 1 TABLET BY MOUTH DAILY, Disp: 90 tablet, Rfl: 3    b complex vitamins capsule, Take 1 capsule by mouth daily, Disp: , Rfl:     BIOTIN PO, Take by mouth, Disp: , Rfl:     calcium carbonate (Calcium 600) 600 MG tablet, Take 600 mg by mouth 2 (two) times a day with meals, Disp: , Rfl:     Cholecalciferol (VITAMIN D3) 1000 units CAPS, Take 2 capsules by mouth daily, Disp: , Rfl:     Lactobacillus Rhamnosus, GG, (CULTURELLE PO), Take by mouth daily, Disp: , Rfl:     Vitamin Mixture (Glenna-C) 500-60 MG TABS, Take by mouth, Disp: , Rfl:         Allergies   Allergen Reactions    Macrobid [Nitrofurantoin] GI Intolerance    Penicillins         Childhood    Protonix [Pantoprazole] Nausea Only         Medical History        Past Medical History:   Diagnosis Date    Anxiety      Diverticulosis      GERD (gastroesophageal reflux disease)      Hiatal hernia      Hypertension      [address]anoma [address] [address] [facility] )      Osteoporosis           Social History   []Expand by Default            Socioeconomic History    Marital status: /Civil Union       Spouse name: Not on file    Number of children: Not on file    Years of education: Not on file    Highest education level: Not on file   Occupational History    Occupation: retired - [employer]    Tobacco Use    Smoking status: Never Smoker    Smokeless tobacco: Never Used    Tobacco comment: No secondhand smoke exposure    Substance and Sexual Activity    Alcohol use: No    Drug use: Not on file    Sexual activity: Not on file   Other Topics Concern    Not on file   Social History Narrative    Not on file      Social Determinants of Health      Financial Resource Strain: Not on file   Food Insecurity: Not on file   Transportation Needs: Not on file   Physical Activity: Not on file   Stress: Not on file   Social Connections: Not on file   Intimate Partner Violence: Not on file   Housing Stability: Not on file               Family History   Problem Relation Age of Onset    Uterine cancer Mother      Prostate cancer Father      Osteoporosis Sister        Surgical History[]Expand by Default         Past Surgical History:   Procedure Laterality Date    EGD AND COLONOSCOPY   05/14/2014     NORMAL/HP            Review of Systems:  Review of Systems   Respiratory: Negative for cough, choking, chest tightness, shortness of breath and wheezing      Cardiovascular: Negative for chest pain, palpitations and leg swelling  Musculoskeletal: Negative for gait problem  Skin: Negative for rash  Neurological: Negative for dizziness, tremors, syncope, weakness, light-headedness, numbness and headaches  Psychiatric/Behavioral: Negative for agitation and behavioral problems  The patient is not hyperactive           Physical Exam:  /72   Pulse 84   Resp 16   Ht 5' 3" (1 6 m)   Wt 58 7 kg (129 lb 6 4 oz)   BMI 22 92 kg/m²      Physical Exam  Constitutional:       General: She is not in acute distress  Appearance: She is well-developed  HENT:      Head: Normocephalic and atraumatic  Neck:      Thyroid: No thyromegaly  Vascular: No carotid bruit or JVD  Trachea: No tracheal deviation  Cardiovascular:      Rate and Rhythm: Normal rate and regular rhythm  Pulses: Normal pulses  Heart sounds: Murmur heard  No friction rub  No gallop  Comments:  Grade 3/6 holosystolic murmur loudest at the apex  Pulmonary:      Effort: Pulmonary effort is normal  No respiratory distress  Breath sounds: Normal breath sounds  No wheezing, rhonchi or rales  Chest:      Chest wall: No tenderness  Musculoskeletal:         General: Normal range of motion  Cervical back: Normal range of motion and neck supple  Right lower leg: No edema  Left lower leg: No edema  Skin:     General: Skin is warm and dry  Neurological:      General: No focal deficit present  Mental Status: She is alert and oriented to person, place, and time  Psychiatric:         Mood and Affect: Mood normal          Behavior: Behavior normal          Thought Content: Thought content normal          Judgment: Judgment normal          ======================================================  Imaging:   I have personally reviewed pertinent reports

## 2022-02-10 NOTE — H&P (VIEW-ONLY)
[facility]  [address] [facility]   [address]  [address]  Phone#  [phone]   Fax#  [phone]  *-*-*-*-*-*-*-*-*-*-*-*-*-*-*-*-*-*-*-*-*-*-*-*-*-*-*-*-*-*-*-*-*-*-*-*-*-*-*-*-*-*-*-*-*-*-*-*-*-*-*-*-*-*                                   Cardiology Follow Up        ENCOUNTER DATE: 02/10/22 9:15 AM  PATIENT NAME: Arbie Saint YOB: 1945    MRN: [id number]  AGE:77 y o  SEX: female  [address] Payne MD     PRIMARY CARE PHYSICIAN: Boby Quick PA-C     ACTIVE DIAGNOSIS THIS VISIT  1  Angina pectoris ([address] )      2  Nonrheumatic mitral valve regurgitation      3  Hypercholesterolemia      4  Essential hypertension         ACTIVE PROBLEM LIST      Patient Active Problem List   Diagnosis    Generalized anxiety disorder    Diverticulosis    GERD without esophagitis    Hiatal hernia    Hypercholesterolemia    Essential hypertension    Irritable bowel syndrome    Malignant melanoma of skin ([address] )    Age-related osteoporosis without current pathological fracture    Squamous cell carcinoma of skin    Splenic artery aneurysm (HCC)    Other shoulder lesions, unspecified shoulder    Other specified disorders of rotator cuff syndrome of shoulder and allied disorders    Healthcare maintenance    Murmur, heart    Dysuria    Medicare annual wellness visit, subsequent    Hair loss    Bilateral impacted cerumen    SOBOE (shortness of breath on exertion)    Nonrheumatic mitral valve regurgitation    Angina pectoris ([address] )    Tick bite         CARDIOLOGY SPECIALTY COMMENTS  Patient was 1st seen on 2021   Patient is a is a 24-year-old female who developed symptoms of chest tightness radiating into her neck and jaw with shortness of breath on physical exertion such as going up a flight of stairs   The discomfort would melba with rest  Rosa Elena Flowers primary care physician ordered a stress echocardiogram which was nondiagnostic and equivocal for myocardial ischemia  [facility] mitral regurgitation was noted on the echocardiogram   Patient states that since the stress test, her exertional chest tightness and shortness of breath has been much less severe   Patient has no family history of coronary artery disease   A sister has a murmur  Sonali Pina is a lifetime nonsmoker      INTERVAL HISTORY:         Patient states that her chest discomfort has returned  It is suggestive of angina pectoris  When she walks the dog outside, when she gets to a top of the hill, she develops a heaviness in her chest   She continues to walk but she is on the for flat and the discomfort abates  When she gets home, she goes up a flight of stairs and at the top of the stairs she again gets the heaviness but it abates in approximately 1 minute  She has no associated symptoms  Other types of exercise which are of shorter duration do not results in discomfort        She has at least moderate mitral regurgitation on examination      DISCUSSION/PLAN:             1  Recommend cardiac catheterization  Patient is agreeable   2  Patient was to have a lipid profile but delayed it to have it done when her primary care physician asked for her to have the lipid profile performed before she return for her primary care visit  Patient has been trying to treat her cholesterol with diet      3  Return in 4 weeks         Current Outpatient Medications:     ALPRAZolam (XANAX) 0 25 mg tablet, Take 1 tablet by mouth daily as needed, Disp: , Rfl:     amLODIPine (NORVASC) 2 5 mg tablet, TAKE 1 TABLET BY MOUTH DAILY, Disp: 90 tablet, Rfl: 3    b complex vitamins capsule, Take 1 capsule by mouth daily, Disp: , Rfl:     BIOTIN PO, Take by mouth, Disp: , Rfl:     calcium carbonate (Calcium 600) 600 MG tablet, Take 600 mg by mouth 2 (two) times a day with meals, Disp: , Rfl:     Cholecalciferol (VITAMIN D3) 1000 units CAPS, Take 2 capsules by mouth daily, Disp: , Rfl:     Lactobacillus Rhamnosus, GG, (CULTURELLE PO), Take by mouth daily, Disp: , Rfl:     Vitamin Mixture (Glenna-C) 500-60 MG TABS, Take by mouth, Disp: , Rfl:         Allergies   Allergen Reactions    Macrobid [Nitrofurantoin] GI Intolerance    Penicillins         Childhood    Protonix [Pantoprazole] Nausea Only         Medical History        Past Medical History:   Diagnosis Date    Anxiety      Diverticulosis      GERD (gastroesophageal reflux disease)      Hiatal hernia      Hypertension      [address]anoma ([facility] [address]      Osteoporosis           Social History   []Expand by Default            Socioeconomic History    Marital status: /Civil Union       Spouse name: Not on file    Number of children: Not on file    Years of education: Not on file    Highest education level: Not on file   Occupational History    Occupation: retired - [employer]    Tobacco Use    Smoking status: Never Smoker    Smokeless tobacco: Never Used    Tobacco comment: No secondhand smoke exposure    Substance and Sexual Activity    Alcohol use: No    Drug use: Not on file    Sexual activity: Not on file   Other Topics Concern    Not on file   Social History Narrative    Not on file      Social Determinants of Health      Financial Resource Strain: Not on file   Food Insecurity: Not on file   Transportation Needs: Not on file   Physical Activity: Not on file   Stress: Not on file   Social Connections: Not on file   Intimate Partner Violence: Not on file   Housing Stability: Not on file               Family History   Problem Relation Age of Onset    Uterine cancer Mother      Prostate cancer Father      Osteoporosis Sister        Surgical History[]Expand by Default         Past Surgical History:   Procedure Laterality Date    EGD AND COLONOSCOPY   05/14/2014     NORMAL/HP            Review of Systems:  Review of Systems   Respiratory: Negative for cough, choking, chest tightness, shortness of breath and wheezing      Cardiovascular: Negative for chest pain, palpitations and leg swelling  Musculoskeletal: Negative for gait problem  Skin: Negative for rash  Neurological: Negative for dizziness, tremors, syncope, weakness, light-headedness, numbness and headaches  Psychiatric/Behavioral: Negative for agitation and behavioral problems  The patient is not hyperactive           Physical Exam:  /72   Pulse 84   Resp 16   Ht 5' 3" (1 6 m)   Wt 58 7 kg (129 lb 6 4 oz)   BMI 22 92 kg/m²      Physical Exam  Constitutional:       General: She is not in acute distress  Appearance: She is well-developed  HENT:      Head: Normocephalic and atraumatic  Neck:      Thyroid: No thyromegaly  Vascular: No carotid bruit or JVD  Trachea: No tracheal deviation  Cardiovascular:      Rate and Rhythm: Normal rate and regular rhythm  Pulses: Normal pulses  Heart sounds: Murmur heard  No friction rub  No gallop  Comments:  Grade 3/6 holosystolic murmur loudest at the apex  Pulmonary:      Effort: Pulmonary effort is normal  No respiratory distress  Breath sounds: Normal breath sounds  No wheezing, rhonchi or rales  Chest:      Chest wall: No tenderness  Musculoskeletal:         General: Normal range of motion  Cervical back: Normal range of motion and neck supple  Right lower leg: No edema  Left lower leg: No edema  Skin:     General: Skin is warm and dry  Neurological:      General: No focal deficit present  Mental Status: She is alert and oriented to person, place, and time  Psychiatric:         Mood and Affect: Mood normal          Behavior: Behavior normal          Thought Content: Thought content normal          Judgment: Judgment normal          ======================================================  Imaging:   I have personally reviewed pertinent reports

## 2022-02-10 NOTE — H&P
[facility]  [address] [facility]   [address]  [address]  Phone#  [phone]   Fax#  [phone]  *-*-*-*-*-*-*-*-*-*-*-*-*-*-*-*-*-*-*-*-*-*-*-*-*-*-*-*-*-*-*-*-*-*-*-*-*-*-*-*-*-*-*-*-*-*-*-*-*-*-*-*-*-*                                   Cardiology Follow Up        ENCOUNTER DATE: 02/10/22 9:15 AM  PATIENT NAME: Arbie Saint YOB: 1945    MRN: [id number]  AGE:77 y o  SEX: female  [address] MD     PRIMARY CARE PHYSICIAN: Boby Quick PA-C     ACTIVE DIAGNOSIS THIS VISIT  1  Angina pectoris ([address] )      2  Nonrheumatic mitral valve regurgitation      3  Hypercholesterolemia      4  Essential hypertension         ACTIVE PROBLEM LIST      Patient Active Problem List   Diagnosis    Generalized anxiety disorder    Diverticulosis    GERD without esophagitis    Hiatal hernia    Hypercholesterolemia    Essential hypertension    Irritable bowel syndrome    Malignant melanoma of skin ([address] )    Age-related osteoporosis without current pathological fracture    Squamous cell carcinoma of skin    Splenic artery aneurysm (HCC)    Other shoulder lesions, unspecified shoulder    Other specified disorders of rotator cuff syndrome of shoulder and allied disorders    Healthcare maintenance    Murmur, heart    Dysuria    Medicare annual wellness visit, subsequent    Hair loss    Bilateral impacted cerumen    SOBOE (shortness of breath on exertion)    Nonrheumatic mitral valve regurgitation    Angina pectoris ([address] )    Tick bite         CARDIOLOGY SPECIALTY COMMENTS  Patient was 1st seen on 2021   Patient is a is a 68-year-old female who developed symptoms of chest tightness radiating into her neck and jaw with shortness of breath on physical exertion such as going up a flight of stairs   The discomfort would melba with rest  Rosa Elena Flowers primary care physician ordered a stress echocardiogram which was nondiagnostic and equivocal for myocardial ischemia  [facility] mitral regurgitation was noted on the echocardiogram   Patient states that since the stress test, her exertional chest tightness and shortness of breath has been much less severe   Patient has no family history of coronary artery disease   A sister has a murmur  Jim Pham is a lifetime nonsmoker      INTERVAL HISTORY:         Patient states that her chest discomfort has returned  It is suggestive of angina pectoris  When she walks the dog outside, when she gets to a top of the hill, she develops a heaviness in her chest   She continues to walk but she is on the for flat and the discomfort abates  When she gets home, she goes up a flight of stairs and at the top of the stairs she again gets the heaviness but it abates in approximately 1 minute  She has no associated symptoms  Other types of exercise which are of shorter duration do not results in discomfort        She has at least moderate mitral regurgitation on examination      DISCUSSION/PLAN:             1  Recommend cardiac catheterization  Patient is agreeable   2  Patient was to have a lipid profile but delayed it to have it done when her primary care physician asked for her to have the lipid profile performed before she return for her primary care visit  Patient has been trying to treat her cholesterol with diet      3  Return in 4 weeks        Current Outpatient Medications:     ALPRAZolam (XANAX) 0 25 mg tablet, Take 1 tablet by mouth daily as needed, Disp: , Rfl:     amLODIPine (NORVASC) 2 5 mg tablet, TAKE 1 TABLET BY MOUTH DAILY, Disp: 90 tablet, Rfl: 3    b complex vitamins capsule, Take 1 capsule by mouth daily, Disp: , Rfl:     BIOTIN PO, Take by mouth, Disp: , Rfl:     calcium carbonate (Calcium 600) 600 MG tablet, Take 600 mg by mouth 2 (two) times a day with meals, Disp: , Rfl:     Cholecalciferol (VITAMIN D3) 1000 units CAPS, Take 2 capsules by mouth daily, Disp: , Rfl:     Lactobacillus Rhamnosus, GG, (CULTURELLE PO), Take by mouth daily, Disp: , Rfl:     Vitamin Mixture (Glenna-C) 500-60 MG TABS, Take by mouth, Disp: , Rfl:         Allergies   Allergen Reactions    Macrobid [Nitrofurantoin] GI Intolerance    Penicillins         Childhood    Protonix [Pantoprazole] Nausea Only         Medical History        Past Medical History:   Diagnosis Date    Anxiety      Diverticulosis      GERD (gastroesophageal reflux disease)      Hiatal hernia      Hypertension      Melanoma ([facility] [address]      Osteoporosis           Social History               Socioeconomic History    Marital status: /Civil Union       Spouse name: Not on file    Number of children: Not on file    Years of education: Not on file    Highest education level: Not on file   Occupational History    Occupation: retired - [employer] [place]   Tobacco Use    Smoking status: Never Smoker    Smokeless tobacco: Never Used    Tobacco comment: No secondhand smoke exposure    Substance and Sexual Activity    Alcohol use: No    Drug use: Not on file    Sexual activity: Not on file   Other Topics Concern    Not on file   Social History Narrative    Not on file      Social Determinants of Health      Financial Resource Strain: Not on file   Food Insecurity: Not on file   Transportation Needs: Not on file   Physical Activity: Not on file   Stress: Not on file   Social Connections: Not on file   Intimate Partner Violence: Not on file   Housing Stability: Not on file               Family History   Problem Relation Age of Onset    Uterine cancer Mother      Prostate cancer Father      Osteoporosis Sister        Surgical History         Past Surgical History:   Procedure Laterality Date    EGD AND COLONOSCOPY   05/14/2014     NORMAL/HP            Review of Systems:  Review of Systems   Respiratory: Negative for cough, choking, chest tightness, shortness of breath and wheezing  Cardiovascular: Negative for chest pain, palpitations and leg swelling     Musculoskeletal: Negative for gait problem  Skin: Negative for rash  Neurological: Negative for dizziness, tremors, syncope, weakness, light-headedness, numbness and headaches  Psychiatric/Behavioral: Negative for agitation and behavioral problems  The patient is not hyperactive           Physical Exam:  /72   Pulse 84   Resp 16   Ht 5' 3" (1 6 m)   Wt 58 7 kg (129 lb 6 4 oz)   BMI 22 92 kg/m²      Physical Exam  Constitutional:       General: She is not in acute distress  Appearance: She is well-developed  HENT:      Head: Normocephalic and atraumatic  Neck:      Thyroid: No thyromegaly  Vascular: No carotid bruit or JVD  Trachea: No tracheal deviation  Cardiovascular:      Rate and Rhythm: Normal rate and regular rhythm  Pulses: Normal pulses  Heart sounds: Murmur heard  No friction rub  No gallop  Comments:  Grade 3/6 holosystolic murmur loudest at the apex  Pulmonary:      Effort: Pulmonary effort is normal  No respiratory distress  Breath sounds: Normal breath sounds  No wheezing, rhonchi or rales  Chest:      Chest wall: No tenderness  Musculoskeletal:         General: Normal range of motion  Cervical back: Normal range of motion and neck supple  Right lower leg: No edema  Left lower leg: No edema  Skin:     General: Skin is warm and dry  Neurological:      General: No focal deficit present  Mental Status: She is alert and oriented to person, place, and time  Psychiatric:         Mood and Affect: Mood normal          Behavior: Behavior normal          Thought Content: Thought content normal          Judgment: Judgment normal          ======================================================  Imaging:   I have personally reviewed pertinent reports

## 2022-02-10 NOTE — PROGRESS NOTES
[facility]  [address] [facility], [place] [address] [facility]  Phone#  [phone]   Fax#  [phone]  *-*-*-*-*-*-*-*-*-*-*-*-*-*-*-*-*-*-*-*-*-*-*-*-*-*-*-*-*-*-*-*-*-*-*-*-*-*-*-*-*-*-*-*-*-*-*-*-*-*-*-*-*-*                                   Cardiology Follow Up      ENCOUNTER DATE: 02/10/22 9:15 AM  PATIENT NAME: Quynh Maddox   : 1945    MRN: [id number]  AGE:77 y o  SEX: female  [address] Donavon Payne MD     PRIMARY CARE PHYSICIAN: Diane Mejia PA-C    ACTIVE DIAGNOSIS THIS VISIT  1  Angina pectoris ([address] )     2  Nonrheumatic mitral valve regurgitation     3  Hypercholesterolemia     4  Essential hypertension       ACTIVE PROBLEM LIST  Patient Active Problem List   Diagnosis    Generalized anxiety disorder    Diverticulosis    GERD without esophagitis    Hiatal hernia    Hypercholesterolemia    Essential hypertension    Irritable bowel syndrome    Malignant melanoma of skin ([address] )    Age-related osteoporosis without current pathological fracture    Squamous cell carcinoma of skin    Splenic artery aneurysm (HCC)    Other shoulder lesions, unspecified shoulder    Other specified disorders of rotator cuff syndrome of shoulder and allied disorders    Healthcare maintenance    Murmur, heart    Dysuria    Medicare annual wellness visit, subsequent    Hair loss    Bilateral impacted cerumen    SOBOE (shortness of breath on exertion)    Nonrheumatic mitral valve regurgitation    Angina pectoris ([address] )    Tick bite       CARDIOLOGY SPECIALTY COMMENTS  Patient was 1st seen on 2021   Patient is a is a 82-year-old female who developed symptoms of chest tightness radiating into her neck and jaw with shortness of breath on physical exertion such as going up a flight of stairs   The discomfort would melba with rest   Her primary care physician ordered a stress echocardiogram which was nondiagnostic and equivocal for myocardial ischemia   Mild mitral regurgitation was noted on the echocardiogram   Patient states that since the stress test, her exertional chest tightness and shortness of breath has been much less severe   Patient has no family history of coronary artery disease   A sister has a murmur  Keyana Mendez is a lifetime nonsmoker  INTERVAL HISTORY:        Patient states that her chest discomfort has returned  It is suggestive of angina pectoris  When she walks the dog outside, when she gets to a top of the hill, she develops a heaviness in her chest   She continues to walk but she is on the for flat and the discomfort abates  When she gets home, she goes up a flight of stairs and at the top of the stairs she again gets the heaviness but it abates in approximately 1 minute  She has no associated symptoms  Other types of exercise which are of shorter duration do not results in discomfort  She has at least moderate mitral regurgitation on examination  DISCUSSION/PLAN:            1  Recommend cardiac catheterization  Patient is agreeable   2  Patient was to have a lipid profile but delayed it to have it done when her primary care physician asked for her to have the lipid profile performed before she return for her primary care visit  Patient has been trying to treat her cholesterol with diet      3  Return in 4 weeks    Lab Studies:    Lab Results   Component Value Date    CHOLESTEROL 198 03/10/2021    CHOLESTEROL 177 03/03/2020    CHOLESTEROL 181 09/19/2018     Lab Results   Component Value Date    TRIG 65 03/10/2021    TRIG 64 03/03/2020    TRIG 64 09/19/2018     Lab Results   Component Value Date    HDL 51 03/10/2021    HDL 55 03/03/2020    HDL 57 09/19/2018     Lab Results   Component Value Date    LDLCALC 134 (H) 03/10/2021    LDLCALC 109 (H) 03/03/2020    LDLCALC 111 (H) 09/19/2018       Lab Results   Component Value Date    LDLDIRECT 112 05/07/2014         Lab Results   Component Value Date    EGFR 74 03/10/2021    EGFR 78 03/03/2020    EGFR 88 09/19/2018    SODIUM 139 03/10/2021    SODIUM 140 03/03/2020    SODIUM 137 09/19/2018    K 4 3 03/10/2021    K 4 3 03/03/2020    K 4 0 09/19/2018     03/10/2021     03/03/2020     09/19/2018    CO2 28 03/10/2021    CO2 28 03/03/2020    CO2 30 09/19/2018    ANIONGAP 5 05/07/2014    BUN 21 03/10/2021    BUN 26 (H) 03/03/2020    BUN 23 09/19/2018    CREATININE 0 79 03/10/2021    CREATININE 0 76 03/03/2020    CREATININE 0 69 09/19/2018     Lab Results   Component Value Date    WBC 6 44 03/10/2021    WBC 6 02 03/03/2020    WBC 6 26 09/19/2018    HGB 14 1 03/10/2021    HGB 13 5 03/03/2020    HGB 13 9 09/19/2018    HCT 43 2 03/10/2021    HCT 42 0 03/03/2020    HCT 42 8 09/19/2018    MCV 92 03/10/2021    MCV 93 03/03/2020    MCV 93 09/19/2018    MCH 30 1 03/10/2021    MCH 29 9 03/03/2020    MCH 30 1 09/19/2018    MCHC 32 6 03/10/2021    MCHC 32 1 03/03/2020    MCHC 32 5 09/19/2018     03/10/2021     03/03/2020     09/19/2018      Lab Results   Component Value Date    GLUCOSE 98 05/07/2014    CALCIUM 9 4 03/10/2021    CALCIUM 9 2 03/03/2020    CALCIUM 9 2 09/19/2018    AST 14 03/10/2021    AST 13 03/03/2020    AST 15 09/19/2018    ALT 35 03/10/2021    ALT 24 03/03/2020    ALT 29 09/19/2018    ALKPHOS 87 03/10/2021    ALKPHOS 73 03/03/2020    ALKPHOS 77 09/19/2018    PROT 7 4 05/07/2014    BILITOT 0 37 05/07/2014     No results found for this visit on 02/10/22        Current Outpatient Medications:     ALPRAZolam (XANAX) 0 25 mg tablet, Take 1 tablet by mouth daily as needed, Disp: , Rfl:     amLODIPine (NORVASC) 2 5 mg tablet, TAKE 1 TABLET BY MOUTH DAILY, Disp: 90 tablet, Rfl: 3    b complex vitamins capsule, Take 1 capsule by mouth daily, Disp: , Rfl:     BIOTIN PO, Take by mouth, Disp: , Rfl:     calcium carbonate (Calcium 600) 600 MG tablet, Take 600 mg by mouth 2 (two) times a day with meals, Disp: , Rfl:     Cholecalciferol (VITAMIN D3) 1000 units CAPS, Take 2 capsules by mouth daily, Disp: , Rfl:     Lactobacillus Rhamnosus, GG, (CULTURELLE PO), Take by mouth daily, Disp: , Rfl:     Vitamin Mixture ([place]-C) 500-60 MG TABS, Take by mouth, Disp: , Rfl:   Allergies   Allergen Reactions    Macrobid [Nitrofurantoin] GI Intolerance    Penicillins      Childhood    Protonix [Pantoprazole] Nausea Only       Past Medical History:   Diagnosis Date    Anxiety     Diverticulosis     GERD (gastroesophageal reflux disease)     Hiatal hernia     Hypertension     Melanoma ([facility] [address] )     Osteoporosis      Social History     Socioeconomic History    Marital status: /Civil Union     Spouse name: Not on file    Number of children: Not on file    Years of education: Not on file    Highest education level: Not on file   Occupational History    Occupation: retired - [employer]   Tobacco Use    Smoking status: Never Smoker    Smokeless tobacco: Never Used    Tobacco comment: No secondhand smoke exposure    Substance and Sexual Activity    Alcohol use: No    Drug use: Not on file    Sexual activity: Not on file   Other Topics Concern    Not on file   Social History Narrative    Not on file     Social Determinants of Health     Financial Resource Strain: Not on file   Food Insecurity: Not on file   Transportation Needs: Not on file   Physical Activity: Not on file   Stress: Not on file   Social Connections: Not on file   Intimate Partner Violence: Not on file   Housing Stability: Not on file      Family History   Problem Relation Age of Onset    Uterine cancer Mother     Prostate cancer Father     Osteoporosis Sister      Past Surgical History:   Procedure Laterality Date    EGD AND COLONOSCOPY  05/14/2014    NORMAL/HP       PREVIOUS WEIGHTS:   Wt Readings from Last 10 Encounters:   02/10/22 58 7 kg (129 lb 6 4 oz)   11/11/21 59 7 kg (131 lb 9 6 oz)   09/13/21 59 9 kg (132 lb)   08/03/21 59 1 kg (130 lb 3 2 oz)   07/07/21 59 4 kg (131 lb)   06/11/21 59 8 kg (131 lb 12 8 oz)   05/27/21 59 kg (130 lb)   04/23/21 59 kg (130 lb)   04/01/21 59 4 kg (131 lb)   03/30/21 58 8 kg (129 lb 9 6 oz)        Review of Systems:  Review of Systems   Respiratory: Negative for cough, choking, chest tightness, shortness of breath and wheezing  Cardiovascular: Negative for chest pain, palpitations and leg swelling  Musculoskeletal: Negative for gait problem  Skin: Negative for rash  Neurological: Negative for dizziness, tremors, syncope, weakness, light-headedness, numbness and headaches  Psychiatric/Behavioral: Negative for agitation and behavioral problems  The patient is not hyperactive  Physical Exam:  /72   Pulse 84   Resp 16   Ht 5' 3" (1 6 m)   Wt 58 7 kg (129 lb 6 4 oz)   BMI 22 92 kg/m²     Physical Exam  Constitutional:       General: She is not in acute distress  Appearance: She is well-developed  HENT:      Head: Normocephalic and atraumatic  Neck:      Thyroid: No thyromegaly  Vascular: No carotid bruit or JVD  Trachea: No tracheal deviation  Cardiovascular:      Rate and Rhythm: Normal rate and regular rhythm  Pulses: Normal pulses  Heart sounds: Murmur heard  No friction rub  No gallop  Comments:  Grade 3/6 holosystolic murmur loudest at the apex  Pulmonary:      Effort: Pulmonary effort is normal  No respiratory distress  Breath sounds: Normal breath sounds  No wheezing, rhonchi or rales  Chest:      Chest wall: No tenderness  Musculoskeletal:         General: Normal range of motion  Cervical back: Normal range of motion and neck supple  Right lower leg: No edema  Left lower leg: No edema  Skin:     General: Skin is warm and dry  Neurological:      General: No focal deficit present  Mental Status: She is alert and oriented to person, place, and time  Psychiatric:         Mood and Affect: Mood normal          Behavior: Behavior normal          Thought Content:  Thought content normal          Judgment: Judgment normal        ======================================================  Imaging:   I have personally reviewed pertinent reports  I spent 40 minutes on the patient's office visit  This time was spent on the day of the visit  I had direct contact with the patient in the office on the day of the visit  Greater than 50% of the total time was spent obtaining a history, examining patient, answering all patient questions, arranging and discussing plan of care with patient as well as directly providing instructions  Additional time then spent on orders and office chart  Portions of the record may have been created with voice recognition software  Occasional wrong word or "sound a like" substitutions may have occurred due to the inherent limitations of voice recognition software  Read the chart carefully and recognize, using context, where substitutions have occurred      SIGNATURES:   Nilsa Hutchins MD

## 2022-02-11 ENCOUNTER — LAB (OUTPATIENT)
Dept: LAB | Facility: CLINIC | Age: 77
End: 2022-02-11
Payer: MEDICARE

## 2022-02-11 DIAGNOSIS — S40.861A TICK BITE OF RIGHT UPPER ARM, INITIAL ENCOUNTER: ICD-10-CM
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
## 2022-02-11 DIAGNOSIS — I20.9 ANGINA PECTORIS (HCC): ICD-10-CM

## 2022-02-11 LAB
ANION GAP SERPL CALCULATED.3IONS-SCNC: 6 MMOL/L (ref 4–13)
BASOPHILS # BLD AUTO: 0.04 THOUSANDS/ΜL (ref 0–0.1)
BASOPHILS NFR BLD AUTO: 1 % (ref 0–1)
BUN SERPL-MCNC: 21 MG/DL (ref 5–25)
CALCIUM SERPL-MCNC: 9.5 MG/DL (ref 8.3–10.1)
CHLORIDE SERPL-SCNC: 107 MMOL/L (ref 100–108)
CHOLEST SERPL-MCNC: 203 MG/DL
CO2 SERPL-SCNC: 26 MMOL/L (ref 21–32)
CREAT SERPL-MCNC: 0.74 MG/DL (ref 0.6–1.3)
EOSINOPHIL # BLD AUTO: 0.11 THOUSAND/ΜL (ref 0–0.61)
EOSINOPHIL NFR BLD AUTO: 2 % (ref 0–6)
ERYTHROCYTE [DISTWIDTH] IN BLOOD BY AUTOMATED COUNT: 13.4 % (ref 11.6–15.1)
GFR SERPL CREATININE-BSD FRML MDRD: 79 ML/MIN/1.73SQ M
GLUCOSE P FAST SERPL-MCNC: 92 MG/DL (ref 65–99)
HCT VFR BLD AUTO: 45.5 % (ref 34.8–46.1)
HDLC SERPL-MCNC: 58 MG/DL
HGB BLD-MCNC: 14.7 G/DL (ref 11.5–15.4)
IMM GRANULOCYTES # BLD AUTO: 0.01 THOUSAND/UL (ref 0–0.2)
IMM GRANULOCYTES NFR BLD AUTO: 0 % (ref 0–2)
INR PPP: 1.06 (ref 0.84–1.19)
LDLC SERPL CALC-MCNC: 132 MG/DL (ref 0–100)
LYMPHOCYTES # BLD AUTO: 2.87 THOUSANDS/ΜL (ref 0.6–4.47)
LYMPHOCYTES NFR BLD AUTO: 44 % (ref 14–44)
MCH RBC QN AUTO: 30.4 PG (ref 26.8–34.3)
MCHC RBC AUTO-ENTMCNC: 32.3 G/DL (ref 31.4–37.4)
MCV RBC AUTO: 94 FL (ref 82–98)
MONOCYTES # BLD AUTO: 0.44 THOUSAND/ΜL (ref 0.17–1.22)
MONOCYTES NFR BLD AUTO: 7 % (ref 4–12)
NEUTROPHILS # BLD AUTO: 3.05 THOUSANDS/ΜL (ref 1.85–7.62)
NEUTS SEG NFR BLD AUTO: 46 % (ref 43–75)
NRBC BLD AUTO-RTO: 0 /100 WBCS
PLATELET # BLD AUTO: 273 THOUSANDS/UL (ref 149–390)
PMV BLD AUTO: 9.6 FL (ref 8.9–12.7)
POTASSIUM SERPL-SCNC: 4.4 MMOL/L (ref 3.5–5.3)
PROTHROMBIN TIME: 13.4 SECONDS (ref 11.6–14.5)
RBC # BLD AUTO: 4.84 MILLION/UL (ref 3.81–5.12)
SODIUM SERPL-SCNC: 139 MMOL/L (ref 136–145)
TRIGL SERPL-MCNC: 63 MG/DL
WBC # BLD AUTO: 6.52 THOUSAND/UL (ref 4.31–10.16)

## 2022-02-11 PROCEDURE — 36415 COLL VENOUS BLD VENIPUNCTURE: CPT

## 2022-02-11 PROCEDURE — 80048 BASIC METABOLIC PNL TOTAL CA: CPT

## 2022-02-11 PROCEDURE — 85025 COMPLETE CBC W/AUTO DIFF WBC: CPT

## 2022-02-11 PROCEDURE — 86618 LYME DISEASE ANTIBODY: CPT

## 2022-02-11 PROCEDURE — 85610 PROTHROMBIN TIME: CPT

## 2022-02-11 PROCEDURE — 80061 LIPID PANEL: CPT

## 2022-02-12 LAB — B BURGDOR IGG+IGM SER-ACNC: 68

## 2022-02-14 ENCOUNTER — TELEPHONE (OUTPATIENT)
Dept: NON INVASIVE DIAGNOSTICS | Facility: HOSPITAL | Age: 77
End: 2022-02-14

## 2022-02-14 NOTE — TELEPHONE ENCOUNTER
Please call patient and tell her that her cholesterol is high  Total cholesterol is 203 and it should be 180 or less  LDL, bad cholesterol, is 132 and it should be 100 or less  We will discuss it more on her return visit  If she wants to try to reduce it with diet, we should recheck it in about 3 months and see whether she is being successful

## 2022-02-24 ENCOUNTER — HOSPITAL ENCOUNTER (OUTPATIENT)
Facility: HOSPITAL | Age: 77
Setting detail: OUTPATIENT SURGERY
Discharge: HOME | End: 2022-02-24
Attending: INTERNAL MEDICINE | Admitting: INTERNAL MEDICINE
Payer: MEDICARE

## 2022-02-24 VITALS
OXYGEN SATURATION: 99 % | HEART RATE: 67 BPM | BODY MASS INDEX: 22.13 KG/M2 | WEIGHT: 129.63 LBS | HEIGHT: 64 IN | DIASTOLIC BLOOD PRESSURE: 57 MMHG | SYSTOLIC BLOOD PRESSURE: 122 MMHG | TEMPERATURE: 98.6 F | RESPIRATION RATE: 16 BRPM

## 2022-02-24 DIAGNOSIS — I20.9 ANGINA PECTORIS (HCC): ICD-10-CM

## 2022-02-24 DIAGNOSIS — I25.10 NONOBSTRUCTIVE ATHEROSCLEROSIS OF CORONARY ARTERY: ICD-10-CM

## 2022-02-24 DIAGNOSIS — I25.10 NONOBSTRUCTIVE ATHEROSCLEROSIS OF CORONARY ARTERY: Primary | ICD-10-CM

## 2022-02-24 LAB
ANION GAP SERPL CALCULATED.3IONS-SCNC: 7 MMOL/L (ref 4–13)
BUN SERPL-MCNC: 19 MG/DL (ref 5–25)
CALCIUM SERPL-MCNC: 9.3 MG/DL (ref 8.3–10.1)
CHLORIDE SERPL-SCNC: 105 MMOL/L (ref 100–108)
CO2 SERPL-SCNC: 28 MMOL/L (ref 21–32)
CREAT SERPL-MCNC: 0.8 MG/DL (ref 0.6–1.3)
GFR SERPL CREATININE-BSD FRML MDRD: 72 ML/MIN/1.73SQ M
GLUCOSE P FAST SERPL-MCNC: 101 MG/DL (ref 65–99)
GLUCOSE SERPL-MCNC: 101 MG/DL (ref 65–140)
POTASSIUM SERPL-SCNC: 4 MMOL/L (ref 3.5–5.3)
SODIUM SERPL-SCNC: 140 MMOL/L (ref 136–145)

## 2022-02-24 PROCEDURE — C1887 CATHETER, GUIDING: HCPCS | Performed by: INTERNAL MEDICINE

## 2022-02-24 PROCEDURE — C1769 GUIDE WIRE: HCPCS | Performed by: INTERNAL MEDICINE

## 2022-02-24 PROCEDURE — 99153 MOD SED SAME PHYS/QHP EA: CPT | Performed by: INTERNAL MEDICINE

## 2022-02-24 PROCEDURE — 93458 L HRT ARTERY/VENTRICLE ANGIO: CPT | Performed by: INTERNAL MEDICINE

## 2022-02-24 PROCEDURE — 93571 IV DOP VEL&/PRESS C FLO 1ST: CPT | Performed by: INTERNAL MEDICINE

## 2022-02-24 PROCEDURE — 80048 BASIC METABOLIC PNL TOTAL CA: CPT | Performed by: PHYSICIAN ASSISTANT

## 2022-02-24 PROCEDURE — 99152 MOD SED SAME PHYS/QHP 5/>YRS: CPT | Performed by: INTERNAL MEDICINE

## 2022-02-24 PROCEDURE — C1894 INTRO/SHEATH, NON-LASER: HCPCS | Performed by: INTERNAL MEDICINE

## 2022-02-24 RX ORDER — ATORVASTATIN CALCIUM 40 MG/1
40 TABLET, FILM COATED ORAL DAILY
Qty: 30 TABLET | Refills: 0 | Status: SHIPPED | OUTPATIENT
Start: 2022-02-24 | End: 2022-02-24

## 2022-02-24 RX ORDER — SODIUM CHLORIDE 9 MG/ML
250 INJECTION, SOLUTION INTRAVENOUS CONTINUOUS
Status: DISPENSED | OUTPATIENT
Start: 2022-02-24 | End: 2022-02-24

## 2022-02-24 RX ORDER — NITROGLYCERIN 20 MG/100ML
INJECTION INTRAVENOUS AS NEEDED
Status: DISCONTINUED | OUTPATIENT
Start: 2022-02-24 | End: 2022-02-24 | Stop reason: HOSPADM

## 2022-02-24 RX ORDER — ONDANSETRON 2 MG/ML
4 INJECTION INTRAMUSCULAR; INTRAVENOUS EVERY 6 HOURS PRN
Status: DISCONTINUED | OUTPATIENT
Start: 2022-02-24 | End: 2022-02-24 | Stop reason: HOSPADM

## 2022-02-24 RX ORDER — ASPIRIN 81 MG/1
81 TABLET ORAL DAILY
Qty: 30 TABLET | Refills: 5 | Status: SHIPPED | OUTPATIENT
Start: 2022-02-24 | End: 2022-03-17 | Stop reason: SINTOL

## 2022-02-24 RX ORDER — FENTANYL CITRATE 50 UG/ML
INJECTION, SOLUTION INTRAMUSCULAR; INTRAVENOUS AS NEEDED
Status: DISCONTINUED | OUTPATIENT
Start: 2022-02-24 | End: 2022-02-24 | Stop reason: HOSPADM

## 2022-02-24 RX ORDER — LIDOCAINE HYDROCHLORIDE 10 MG/ML
INJECTION, SOLUTION EPIDURAL; INFILTRATION; INTRACAUDAL; PERINEURAL AS NEEDED
Status: DISCONTINUED | OUTPATIENT
Start: 2022-02-24 | End: 2022-02-24 | Stop reason: HOSPADM

## 2022-02-24 RX ORDER — SODIUM CHLORIDE 9 MG/ML
125 INJECTION, SOLUTION INTRAVENOUS CONTINUOUS
Status: DISCONTINUED | OUTPATIENT
Start: 2022-02-24 | End: 2022-02-24 | Stop reason: HOSPADM

## 2022-02-24 RX ORDER — ASPIRIN 81 MG/1
324 TABLET, CHEWABLE ORAL ONCE
Status: COMPLETED | OUTPATIENT
Start: 2022-02-24 | End: 2022-02-24

## 2022-02-24 RX ORDER — HEPARIN SODIUM 1000 [USP'U]/ML
INJECTION, SOLUTION INTRAVENOUS; SUBCUTANEOUS AS NEEDED
Status: DISCONTINUED | OUTPATIENT
Start: 2022-02-24 | End: 2022-02-24 | Stop reason: HOSPADM

## 2022-02-24 RX ORDER — ATORVASTATIN CALCIUM 40 MG/1
TABLET, FILM COATED ORAL
Qty: 90 TABLET | Refills: 0 | Status: SHIPPED | OUTPATIENT
Start: 2022-02-24 | End: 2022-03-17

## 2022-02-24 RX ORDER — MIDAZOLAM HYDROCHLORIDE 2 MG/2ML
INJECTION, SOLUTION INTRAMUSCULAR; INTRAVENOUS AS NEEDED
Status: DISCONTINUED | OUTPATIENT
Start: 2022-02-24 | End: 2022-02-24 | Stop reason: HOSPADM

## 2022-02-24 RX ORDER — ACETAMINOPHEN 325 MG/1
650 TABLET ORAL EVERY 4 HOURS PRN
Status: DISCONTINUED | OUTPATIENT
Start: 2022-02-24 | End: 2022-02-24 | Stop reason: HOSPADM

## 2022-02-24 RX ORDER — VERAPAMIL HCL 2.5 MG/ML
AMPUL (ML) INTRAVENOUS AS NEEDED
Status: DISCONTINUED | OUTPATIENT
Start: 2022-02-24 | End: 2022-02-24 | Stop reason: HOSPADM

## 2022-02-24 RX ADMIN — ASPIRIN 81 MG CHEWABLE TABLET 324 MG: 81 TABLET CHEWABLE at 07:36

## 2022-02-24 RX ADMIN — SODIUM CHLORIDE 125 ML/HR: 0.9 INJECTION, SOLUTION INTRAVENOUS at 07:51

## 2022-02-24 NOTE — DISCHARGE INSTRUCTIONS
Due to a 50 % stenosis (blockage) in the circumflex, your NEW MEDICATIONS:  Start metoprolol tartrate 12 5 mg twice daily  Start aspirin 81 mg daily  Start atorvastatin 40 mg daily  You will follow-up with Dr Tessy Vincent on 3/17/22  BLOOD TEST  [place] have chemistry (BMP) blood test done - hopefully Saturday or Sunday of this week  An electronic prescription has been sent to the [facility] lab for it  You do not need to fast for this test    -----------------------------------------------------------------------------------------------------------------------------------------------------------------------------  SITE CARE  1  Please see the post cardiac catheterization dishcarge instructions  No lifting greater than 10 lbs  or strenuous activity for 48 hrs  2  Remove band aid tomorrow  Shower and wash area (wrist) gently with soap and water- beginning tomorrow  Rinse and pat dry  Apply new water seal band aid  Repeat this process for 5 days  No powders, creams lotions or antibiotic ointments for 5 days  No tub baths, hot tubs/sauna or swimming for 5 days  3  Please call our office ([phone]) if you have any fever, redness, swelling, discharge from your wrist/access site  4  No driving for 1 day       -----------------------------------------------------------------------------------------------------------------------------------------------------------------------------  PROCEDURE INFORMATION    LEFT HEART CATHETERIZATION    WHAT YOU NEED TO KNOW:   A left heart catheterization is a procedure to look at your heart and its arteries  You may need this procedure if you have chest pain, heart disease, or your heart is not working as it should  DISCHARGE INSTRUCTIONS:   Follow up with your healthcare provider as directed:  Write down your questions so you remember to ask them during your visits    Limit activity as directed:   · Avoid unnecessary stair climbing for 48 hours, if a catheter was put in your groin  · Do not place pressure on your arm, hand, or wrist, if the catheter was placed in your wrist  Avoid pushing, pulling, or heavy lifting with that arm  · If you need to cough, support the area where the catheter was inserted with your hand  · Ask your healthcare provider how long you need to limit movement and avoid certain activities  · You may feel like resting more after your procedure  Slowly start to do more each day  Rest when you feel it is needed  Drink liquids as directed:  Liquids help flush the dye used for your procedure out of your body  Ask your healthcare provider how much liquid to drink each day, and which liquids to drink  Some foods, such as soup and fruit, also provide liquid  Wound care:  Ask your healthcare provider about how to care for your incision wound  Ask when you can get into a tub, shower, or pool  Contact your healthcare provider if:   · You have a fever  · The skin around your wound is red, swollen, or has pus coming from it  · You have trouble breathing, or your skin is itchy, swollen, or has a rash  · You have questions or concerns about your condition or care  Seek care immediately or call 911 if:   · The area where the catheter was placed is swollen and filled with blood or is bleeding  · The leg or arm used for the procedure becomes numb or turns white or blue  · You feel lightheaded, short of breath, and have chest pain  · You cough up blood  · You have any of the following signs of a heart attack:      ¨ Squeezing, pressure, or pain in your chest that lasts longer than 5 minutes or returns    ¨ Discomfort or pain in your back, neck, jaw, stomach, or arm     ¨ Trouble breathing    ¨ Nausea or vomiting    ¨ Lightheadedness or a sudden cold sweat, especially with chest pain or trouble breathing    · Your arm or leg feels warm, tender, and painful  It may look swollen and red      · You have any of the following signs of a stroke:     ¨ Part of your face droops or is numb    ¨ Weakness in an arm or leg    ¨ Confusion or difficulty speaking    ¨ Dizziness, a severe headache, or vision loss  © 2017 [facility] Information is for End User's use only and may not be sold, redistributed or otherwise used for commercial purposes  All illustrations and images included in CareNotes® are the copyrighted property of A D A M , Inc  or Melvin Martinez  The above information is an  only  It is not intended as medical advice for individual conditions or treatments  Talk to your doctor, nurse or pharmacist before following any medical regimen to see if it is safe and effective for you

## 2022-02-24 NOTE — Clinical Note
Left heart catheterization: A catheter was advanced over a guidewire into the ascending aorta  After recording ascending aortic pressure, the catheter was advanced across the aortic valve and left ventricular pressure was recorded  The catheter was   pulled back across the aortic valve and into the ascending aorta and pullback pressures were obtained

## 2022-02-24 NOTE — INTERVAL H&P NOTE
/70   Pulse 73   Temp 98 6 °F (37 °C) (Temporal)   Resp 16   Ht 5' 3 5" (1 613 m)   Wt 58 8 kg (129 lb 10 1 oz)   SpO2 99%   Breastfeeding No   BMI 22 60 kg/m²      H&P reviewed  After examining the patient, I find no changed to the H&P since it had been written  Patient re-evaluated   Accept as history and physical     Claudia Ingram MD/February 24, 2022/7:33 AM

## 2022-02-24 NOTE — PROGRESS NOTES
Report to Kenn at [phone]  Patient is alert, oriented and in no distress  TR band intact to right radial access site without bleeding  Patient has good sensation and adequate cap refill   at bedside

## 2022-02-26 ENCOUNTER — APPOINTMENT (OUTPATIENT)
Dept: LAB | Facility: HOSPITAL | Age: 77
End: 2022-02-26
Payer: MEDICARE

## 2022-02-26 DIAGNOSIS — I25.10 NONOBSTRUCTIVE ATHEROSCLEROSIS OF CORONARY ARTERY: ICD-10-CM

## 2022-02-26 LAB
ANION GAP SERPL CALCULATED.3IONS-SCNC: 6 MMOL/L (ref 4–13)
BUN SERPL-MCNC: 19 MG/DL (ref 5–25)
CALCIUM SERPL-MCNC: 8.9 MG/DL (ref 8.3–10.1)
CHLORIDE SERPL-SCNC: 103 MMOL/L (ref 100–108)
CO2 SERPL-SCNC: 30 MMOL/L (ref 21–32)
CREAT SERPL-MCNC: 0.72 MG/DL (ref 0.6–1.3)
GFR SERPL CREATININE-BSD FRML MDRD: 82 ML/MIN/1.73SQ M
GLUCOSE SERPL-MCNC: 94 MG/DL (ref 65–140)
POTASSIUM SERPL-SCNC: 3.9 MMOL/L (ref 3.5–5.3)
SODIUM SERPL-SCNC: 139 MMOL/L (ref 136–145)

## 2022-02-26 PROCEDURE — 80048 BASIC METABOLIC PNL TOTAL CA: CPT

## 2022-02-26 PROCEDURE — 36415 COLL VENOUS BLD VENIPUNCTURE: CPT

## 2022-03-07 ENCOUNTER — TELEPHONE (OUTPATIENT)
Dept: CARDIOLOGY CLINIC | Facility: CLINIC | Age: 77
End: 2022-03-07

## 2022-03-07 NOTE — TELEPHONE ENCOUNTER
Pt is calling states d/c on metoprolol 12 5 mg BID and states is unable to function on the medication   /49 106/55 feels very fatigue and woozy  She held yesterday and today and felt like could actually function today was able to go for a walk  She states did not even feel comfortable driving on the medication  Has upcoming appt 3/17 with Dr Janessa Toney  Please advise

## 2022-03-11 ENCOUNTER — APPOINTMENT (OUTPATIENT)
Dept: LAB | Facility: CLINIC | Age: 77
End: 2022-03-11
Payer: MEDICARE

## 2022-03-11 DIAGNOSIS — Z11.59 NEED FOR HEPATITIS C SCREENING TEST: ICD-10-CM

## 2022-03-11 DIAGNOSIS — M79.18 MYALGIA, OTHER SITE: ICD-10-CM

## 2022-03-11 DIAGNOSIS — E78.00 HYPERCHOLESTEROLEMIA: ICD-10-CM

## 2022-03-11 DIAGNOSIS — M79.10 MYALGIA: ICD-10-CM

## 2022-03-11 LAB
25(OH)D3 SERPL-MCNC: 66.2 NG/ML (ref 30–100)
ALBUMIN SERPL BCP-MCNC: 3.8 G/DL (ref 3.5–5)
ALP SERPL-CCNC: 82 U/L (ref 46–116)
ALT SERPL W P-5'-P-CCNC: 33 U/L (ref 12–78)
ANION GAP SERPL CALCULATED.3IONS-SCNC: 3 MMOL/L (ref 4–13)
AST SERPL W P-5'-P-CCNC: 18 U/L (ref 5–45)
BILIRUB SERPL-MCNC: 0.5 MG/DL (ref 0.2–1)
BUN SERPL-MCNC: 21 MG/DL (ref 5–25)
CALCIUM SERPL-MCNC: 9.1 MG/DL (ref 8.3–10.1)
CHLORIDE SERPL-SCNC: 107 MMOL/L (ref 100–108)
CHOLEST SERPL-MCNC: 117 MG/DL
CO2 SERPL-SCNC: 28 MMOL/L (ref 21–32)
CREAT SERPL-MCNC: 0.73 MG/DL (ref 0.6–1.3)
GFR SERPL CREATININE-BSD FRML MDRD: 80 ML/MIN/1.73SQ M
GLUCOSE P FAST SERPL-MCNC: 97 MG/DL (ref 65–99)
HCV AB SER QL: NORMAL
HDLC SERPL-MCNC: 51 MG/DL
LDLC SERPL CALC-MCNC: 53 MG/DL (ref 0–100)
NONHDLC SERPL-MCNC: 66 MG/DL
POTASSIUM SERPL-SCNC: 4.2 MMOL/L (ref 3.5–5.3)
PROT SERPL-MCNC: 7.5 G/DL (ref 6.4–8.2)
SODIUM SERPL-SCNC: 138 MMOL/L (ref 136–145)
TRIGL SERPL-MCNC: 67 MG/DL

## 2022-03-11 PROCEDURE — 86803 HEPATITIS C AB TEST: CPT

## 2022-03-11 PROCEDURE — 80053 COMPREHEN METABOLIC PANEL: CPT

## 2022-03-11 PROCEDURE — 36415 COLL VENOUS BLD VENIPUNCTURE: CPT

## 2022-03-11 PROCEDURE — 80061 LIPID PANEL: CPT

## 2022-03-11 PROCEDURE — 82306 VITAMIN D 25 HYDROXY: CPT

## 2022-03-17 ENCOUNTER — OFFICE VISIT (OUTPATIENT)
Dept: CARDIOLOGY CLINIC | Facility: CLINIC | Age: 77
End: 2022-03-17
Payer: MEDICARE

## 2022-03-17 VITALS
SYSTOLIC BLOOD PRESSURE: 130 MMHG | WEIGHT: 132 LBS | DIASTOLIC BLOOD PRESSURE: 66 MMHG | HEART RATE: 74 BPM | BODY MASS INDEX: 23.02 KG/M2

## 2022-03-17 DIAGNOSIS — I25.10 NONOBSTRUCTIVE ATHEROSCLEROSIS OF CORONARY ARTERY: Primary | ICD-10-CM

## 2022-03-17 DIAGNOSIS — I20.9 ANGINA PECTORIS (HCC): ICD-10-CM

## 2022-03-17 DIAGNOSIS — I10 ESSENTIAL HYPERTENSION: ICD-10-CM

## 2022-03-17 DIAGNOSIS — E78.00 HYPERCHOLESTEROLEMIA: ICD-10-CM

## 2022-03-17 DIAGNOSIS — I25.10 NONOBSTRUCTIVE ATHEROSCLEROSIS OF CORONARY ARTERY: ICD-10-CM

## 2022-03-17 PROCEDURE — 93000 ELECTROCARDIOGRAM COMPLETE: CPT | Performed by: INTERNAL MEDICINE

## 2022-03-17 PROCEDURE — 99215 OFFICE O/P EST HI 40 MIN: CPT | Performed by: INTERNAL MEDICINE

## 2022-03-17 RX ORDER — ATORVASTATIN CALCIUM 20 MG/1
20 TABLET, FILM COATED ORAL DAILY
Qty: 90 TABLET | Refills: 3 | Status: SHIPPED | OUTPATIENT
Start: 2022-03-17

## 2022-03-17 RX ORDER — CLOPIDOGREL BISULFATE 75 MG/1
75 TABLET ORAL DAILY
Qty: 30 TABLET | Refills: 5 | Status: SHIPPED | OUTPATIENT
Start: 2022-03-17 | End: 2022-03-18

## 2022-03-17 NOTE — PROGRESS NOTES
[facility]  [address]  Phone#  [phone]   Fax#  [phone]  *-*-*-*-*-*-*-*-*-*-*-*-*-*-*-*-*-*-*-*-*-*-*-*-*-*-*-*-*-*-*-*-*-*-*-*-*-*-*-*-*-*-*-*-*-*-*-*-*-*-*-*-*-*                                   Cardiology Follow Up      ENCOUNTER DATE: 22 10:16 AM  PATIENT NAME: Asmita Qureshi   : 1945    MRN: [id number]  AGE:77 y o  SEX: female  [address] Donavon Payne MD     PRIMARY CARE PHYSICIAN: Pipo Rome PA-C    ACTIVE DIAGNOSIS THIS VISIT  1  Nonobstructive atherosclerosis of coronary artery  POCT ECG    clopidogrel (Plavix) 75 mg tablet    atorvastatin (LIPITOR) 20 mg tablet   2  Hypercholesterolemia  Lipid Panel with Direct LDL reflex   3  Essential hypertension     4  Angina pectoris (HCC)  atorvastatin (LIPITOR) 20 mg tablet     ACTIVE PROBLEM LIST  Patient Active Problem List   Diagnosis    Generalized anxiety disorder    Diverticulosis    GERD without esophagitis    Hiatal hernia    Hypercholesterolemia    Essential hypertension    Irritable bowel syndrome    Malignant melanoma of skin ([address] )    Age-related osteoporosis without current pathological fracture    Squamous cell carcinoma of skin    Splenic artery aneurysm (HCC)    Other shoulder lesions, unspecified shoulder    Other specified disorders of rotator cuff syndrome of shoulder and allied disorders    Healthcare maintenance    Murmur, heart    Dysuria    Medicare annual wellness visit, subsequent    Hair loss    Bilateral impacted cerumen    SOBOE (shortness of breath on exertion)    Nonrheumatic mitral valve regurgitation    Angina pectoris (HCC)    Tick bite    Nonobstructive atherosclerosis of coronary artery       CARDIOLOGY SPECIALTY COMMENTS  Patient was 1st seen on 2021   Patient is a is a 28-year-old female who developed symptoms of chest tightness radiating into her neck and jaw with shortness of breath on physical exertion such as going up a flight of stairs   The discomfort would melba with rest  Rich Marciano primary care physician ordered a stress echocardiogram which was nondiagnostic and equivocal for myocardial ischemia   Mild mitral regurgitation was noted on the echocardiogram   Patient states that since the stress test, her exertional chest tightness and shortness of breath has been much less severe   Patient has no family history of coronary artery disease   A sister has a murmur  Jacqui Sosa is a lifetime nonsmoker  06/23/2021 nuclear stress test: Resting hypertension with exaggerated hypertensive response to exercise negative treadmill stress test for angina pectoris but with abnormal ST depression with exercise  Exercise induced ventricular ectopy  Normal LV systolic function, EF 13%  Normal tomographic perfusion series  02/24/2022 cardiac catheterization:  Ostial circumflex lesion 50% stenosis  INTERVAL HISTORY:        Patient returns following a cardiac catheterization which demonstrated a 50% ostial circumflex lesion  She was having angina pectoris  She states that the frequency, severity and duration of her chest pain has become much less  She was on metoprolol at the time of discharge from the hospital   She was given 12 5 mg b i d  However she developed severe fatigue and was even afraid to drive because of how poorly she fell  She stop the metoprolol and returned to normal   If she needs a beta-blocker in the future, she may benefit by a trial of bisoprolol or Bystolic  She was placed on atorvastatin 40 mg daily  On atorvastatin 40 mg daily she has been having cramps in her legs at night  She thinks that they are related to the atorvastatin  Prior to taking the medication she did not have cramps  She was also started on aspirin in the hospital   She is taking 81 mg daily  Since she has been on the aspirin she has had a mild low-grade ringing in her ears  She states that is very in knowing    She never had it previously  DISCUSSION/PLAN:            1  Stop metoprolol   2  Decrease atorvastatin to 20 mg daily   3  Stop aspirin   4  Begin clopidogrel 75 mg daily   5  Return in 3 months   6  Fasting lipid profile prior to return visit      Lab Studies:    Lab Results   Component Value Date    CHOLESTEROL 117 03/11/2022    CHOLESTEROL 203 (H) 02/11/2022    CHOLESTEROL 198 03/10/2021     Lab Results   Component Value Date    TRIG 67 03/11/2022    TRIG 63 02/11/2022    TRIG 65 03/10/2021     Lab Results   Component Value Date    HDL 51 03/11/2022    HDL 58 02/11/2022    HDL 51 03/10/2021     Lab Results   Component Value Date    LDLCALC 53 03/11/2022    LDLCALC 132 (H) 02/11/2022    LDLCALC 134 (H) 03/10/2021     Lab Results   Component Value Date    LDLDIRECT 112 05/07/2014       Lab Results   Component Value Date    EGFR 80 03/11/2022    EGFR 82 02/26/2022    EGFR 72 02/24/2022    SODIUM 138 03/11/2022    SODIUM 139 02/26/2022    SODIUM 140 02/24/2022    K 4 2 03/11/2022    K 3 9 02/26/2022    K 4 0 02/24/2022     03/11/2022     02/26/2022     02/24/2022    CO2 28 03/11/2022    CO2 30 02/26/2022    CO2 28 02/24/2022    ANIONGAP 5 05/07/2014    BUN 21 03/11/2022    BUN 19 02/26/2022    BUN 19 02/24/2022    CREATININE 0 73 03/11/2022    CREATININE 0 72 02/26/2022    CREATININE 0 80 02/24/2022     Lab Results   Component Value Date    WBC 6 52 02/11/2022    WBC 6 44 03/10/2021    WBC 6 02 03/03/2020    HGB 14 7 02/11/2022    HGB 14 1 03/10/2021    HGB 13 5 03/03/2020    HCT 45 5 02/11/2022    HCT 43 2 03/10/2021    HCT 42 0 03/03/2020    MCV 94 02/11/2022    MCV 92 03/10/2021    MCV 93 03/03/2020    MCH 30 4 02/11/2022    MCH 30 1 03/10/2021    MCH 29 9 03/03/2020    MCHC 32 3 02/11/2022    MCHC 32 6 03/10/2021    MCHC 32 1 03/03/2020     02/11/2022     03/10/2021     03/03/2020      Lab Results   Component Value Date    GLUCOSE 98 05/07/2014    CALCIUM 9 1 03/11/2022 CALCIUM 8 9 02/26/2022    CALCIUM 9 3 02/24/2022    AST 18 03/11/2022    AST 14 03/10/2021    AST 13 03/03/2020    ALT 33 03/11/2022    ALT 35 03/10/2021    ALT 24 03/03/2020    ALKPHOS 82 03/11/2022    ALKPHOS 87 03/10/2021    ALKPHOS 73 03/03/2020    PROT 7 4 05/07/2014    BILITOT 0 37 05/07/2014       Results for orders placed or performed in visit on 03/17/22   POCT ECG    Narrative      Normal sinus rhythm at a rate of 74 beats per minute  Normal EKG  Current Outpatient Medications:     ALPRAZolam (XANAX) 0 25 mg tablet, Take 1 tablet by mouth daily as needed, Disp: , Rfl:     amLODIPine (NORVASC) 2 5 mg tablet, TAKE 1 TABLET BY MOUTH DAILY, Disp: 90 tablet, Rfl: 3    atorvastatin (LIPITOR) 20 mg tablet, Take 1 tablet (20 mg total) by mouth daily, Disp: 90 tablet, Rfl: 3    b complex vitamins capsule, Take 1 capsule by mouth daily, Disp: , Rfl:     BIOTIN PO, Take by mouth, Disp: , Rfl:     calcium carbonate (Calcium 600) 600 MG tablet, Take 600 mg by mouth 2 (two) times a day with meals, Disp: , Rfl:     Cholecalciferol (VITAMIN D3) 1000 units CAPS, Take 2 capsules by mouth daily, Disp: , Rfl:     Lactobacillus Rhamnosus, GG, (CULTURELLE PO), Take by mouth daily, Disp: , Rfl:     Vitamin Mixture (Glen[place]-C) 500-60 MG TABS, Take by mouth, Disp: , Rfl:     clopidogrel (Plavix) 75 mg tablet, Take 1 tablet (75 mg total) by mouth daily, Disp: 30 tablet, Rfl: 5  Allergies   Allergen Reactions    Macrobid [Nitrofurantoin] GI Intolerance    Penicillins Other (See Comments)     Childhood   Per pt, unsure of reaction    Protonix [Pantoprazole] Nausea Only    Wound Dressing Adhesive Other (See Comments)     bandaid if on for more than 24 hours       Past Medical History:   Diagnosis Date    Anxiety     Diverticulosis     GERD (gastroesophageal reflux disease)     Hiatal hernia     Hypertension     Melanoma ([address] )     Osteoporosis      Social History     Socioeconomic History    Marital status: /Civil [employer]     Spouse name: Not on file    Number of children: Not on file    Years of education: Not on file    Highest education level: Not on file   Occupational History    Occupation: retired - [employer]    Tobacco Use    Smoking status: Never Smoker    Smokeless tobacco: Never Used    Tobacco comment: No secondhand smoke exposure    Vaping Use    Vaping Use: Never used   Substance and Sexual Activity    Alcohol use: Yes     Comment: very rare socially    Drug use: Not on file    Sexual activity: Not on file   Other Topics Concern    Not on file   Social History Narrative    Not on file     Social Determinants of Health     Financial Resource Strain: Not on file   Food Insecurity: Not on file   Transportation Needs: Not on file   Physical Activity: Not on file   Stress: Not on file   Social Connections: Not on file   Intimate Partner Violence: Not on file   Housing Stability: Not on file      Family History   Problem Relation Age of Onset    Uterine cancer Mother     Prostate cancer Father     Osteoporosis Sister      Past Surgical History:   Procedure Laterality Date    CARDIAC CATHETERIZATION N/A 2/24/2022    Procedure: Cardiac catheterization;  Surgeon: Eveline Valladares MD;  Location: [facility]; Service: Cardiology    CARDIAC CATHETERIZATION N/A 2/24/2022    Procedure: Cardiac Coronary Angiogram;  Surgeon: Eveline Valladares MD;  Location: [facility];   Service: Cardiology    EGD AND COLONOSCOPY  05/14/2014    NORMAL/HP       PREVIOUS WEIGHTS:   Wt Readings from Last 10 Encounters:   03/17/22 59 9 kg (132 lb)   02/24/22 58 8 kg (129 lb 10 1 oz)   02/10/22 58 7 kg (129 lb 6 4 oz)   11/11/21 59 7 kg (131 lb 9 6 oz)   09/13/21 59 9 kg (132 lb)   08/03/21 59 1 kg (130 lb 3 2 oz)   07/07/21 59 4 kg (131 lb)   06/11/21 59 8 kg (131 lb 12 8 oz)   05/27/21 59 kg (130 lb)   04/23/21 59 kg (130 lb)        Review of Systems:  Review of Systems   Respiratory: Negative for cough, choking, chest tightness, shortness of breath and wheezing  Cardiovascular: Negative for chest pain, palpitations and leg swelling  Musculoskeletal: Negative for gait problem  Skin: Negative for rash  Neurological: Negative for dizziness, tremors, syncope, weakness, light-headedness, numbness and headaches  Psychiatric/Behavioral: Negative for agitation and behavioral problems  The patient is not hyperactive  Physical Exam:  /66 (BP Location: Left arm, Patient Position: Sitting, Cuff Size: Large)   Pulse 74   Wt 59 9 kg (132 lb)   BMI 23 02 kg/m²     Physical Exam  Constitutional:       General: She is not in acute distress  Appearance: She is well-developed  HENT:      Head: Normocephalic and atraumatic  Neck:      Thyroid: No thyromegaly  Vascular: No carotid bruit or JVD  Trachea: No tracheal deviation  Cardiovascular:      Rate and Rhythm: Normal rate and regular rhythm  Pulses: Normal pulses  Heart sounds: Murmur heard  Systolic murmur is present  No friction rub  No gallop  Comments:  Grade 3/6 holosystolic murmur at the apex radiating to the axilla  Pulmonary:      Effort: Pulmonary effort is normal  No respiratory distress  Breath sounds: Normal breath sounds  No wheezing, rhonchi or rales  Chest:      Chest wall: No tenderness  Abdominal:      General: There is no distension  Musculoskeletal:         General: Normal range of motion  Cervical back: Normal range of motion and neck supple  Right lower leg: No edema  Left lower leg: No edema  Skin:     General: Skin is warm and dry  Neurological:      General: No focal deficit present  Mental Status: She is alert and oriented to person, place, and time  Gait: Gait normal    Psychiatric:         Mood and Affect: Mood normal          Behavior: Behavior normal          Thought Content:  Thought content normal          Judgment: Judgment normal        ======================================================  Imaging:   I have personally reviewed pertinent reports  I spent 40 minutes on the patient's office visit  This time was spent on the day of the visit  I had direct contact with the patient in the office on the day of the visit  Greater than 50% of the total time was spent obtaining a history, examining patient, answering all patient questions, arranging and discussing plan of care with patient as well as directly providing instructions  All of patient's questions were answered to her satisfaction  Additional time then spent on orders and office chart  Portions of the record may have been created with voice recognition software  Occasional wrong word or "sound a like" substitutions may have occurred due to the inherent limitations of voice recognition software  Read the chart carefully and recognize, using context, where substitutions have occurred      SIGNATURES:   Miguelangel Lopez MD

## 2022-03-18 RX ORDER — CLOPIDOGREL BISULFATE 75 MG/1
TABLET ORAL
Qty: 90 TABLET | Refills: 3 | Status: SHIPPED | OUTPATIENT
Start: 2022-03-18 | End: 2022-06-20 | Stop reason: ALTCHOICE

## 2022-03-21 ENCOUNTER — RA CDI HCC (OUTPATIENT)
Dept: OTHER | Facility: HOSPITAL | Age: 77
End: 2022-03-21

## 2022-03-21 NOTE — PROGRESS NOTES
[address]  coding opportunities       Chart reviewed, no opportunity found: CHART REVIEWED, NO OPPORTUNITY FOUND        Patients Insurance     Medicare Insurance: Medicare

## 2022-03-25 ENCOUNTER — OFFICE VISIT (OUTPATIENT)
Dept: FAMILY MEDICINE CLINIC | Facility: CLINIC | Age: 77
End: 2022-03-25
Payer: MEDICARE

## 2022-03-25 VITALS
DIASTOLIC BLOOD PRESSURE: 58 MMHG | HEART RATE: 65 BPM | HEIGHT: 64 IN | BODY MASS INDEX: 22.3 KG/M2 | SYSTOLIC BLOOD PRESSURE: 130 MMHG | WEIGHT: 130.6 LBS | OXYGEN SATURATION: 98 %

## 2022-03-25 DIAGNOSIS — F41.1 GENERALIZED ANXIETY DISORDER: ICD-10-CM

## 2022-03-25 DIAGNOSIS — I20.9 ANGINA PECTORIS (HCC): ICD-10-CM

## 2022-03-25 DIAGNOSIS — M81.0 AGE-RELATED OSTEOPOROSIS WITHOUT CURRENT PATHOLOGICAL FRACTURE: ICD-10-CM

## 2022-03-25 DIAGNOSIS — I10 ESSENTIAL HYPERTENSION: ICD-10-CM

## 2022-03-25 DIAGNOSIS — Z78.0 MENOPAUSE: Primary | ICD-10-CM

## 2022-03-25 DIAGNOSIS — E78.00 HYPERCHOLESTEROLEMIA: ICD-10-CM

## 2022-03-25 DIAGNOSIS — C43.9 MALIGNANT MELANOMA OF SKIN (HCC): ICD-10-CM

## 2022-03-25 DIAGNOSIS — Z00.00 MEDICARE ANNUAL WELLNESS VISIT, SUBSEQUENT: ICD-10-CM

## 2022-03-25 DIAGNOSIS — K21.9 GERD WITHOUT ESOPHAGITIS: ICD-10-CM

## 2022-03-25 DIAGNOSIS — I25.10 NONOBSTRUCTIVE ATHEROSCLEROSIS OF CORONARY ARTERY: ICD-10-CM

## 2022-03-25 PROBLEM — R30.0 DYSURIA: Status: RESOLVED | Noted: 2021-01-09 | Resolved: 2022-03-25
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
## 2022-03-25 PROBLEM — R06.02 SOBOE (SHORTNESS OF BREATH ON EXERTION): Status: RESOLVED | Noted: 2021-03-30 | Resolved: 2022-03-25

## 2022-03-25 PROCEDURE — 99214 OFFICE O/P EST MOD 30 MIN: CPT | Performed by: PHYSICIAN ASSISTANT

## 2022-03-25 PROCEDURE — 1123F ACP DISCUSS/DSCN MKR DOCD: CPT | Performed by: PHYSICIAN ASSISTANT

## 2022-03-25 PROCEDURE — G0439 PPPS, SUBSEQ VISIT: HCPCS | Performed by: PHYSICIAN ASSISTANT

## 2022-03-25 RX ORDER — ALPRAZOLAM 0.25 MG/1
0.25 TABLET ORAL DAILY PRN
Qty: 30 TABLET | Refills: 0 | Status: SHIPPED | OUTPATIENT
Start: 2022-03-25

## 2022-03-25 NOTE — PROGRESS NOTES
Assessment and Plan:     Problem List Items Addressed This Visit        Cardiovascular and Mediastinum    Essential hypertension       Musculoskeletal and Integument    Malignant melanoma of skin ([facility] [address] )    Age-related osteoporosis without current pathological fracture       Other    Generalized anxiety disorder    Hypercholesterolemia    Medicare annual wellness visit, subsequent     Blue pack in 5 wishes given in the past patient encouraged to bring her living will to put under her ACP tab  Pneumonia and COVID vaccines up-to-date encouraged to get new shingles vaccine at the pharmacy  Blood work up-to-date  Bone density is due and ordered  Other Visit Diagnoses     Menopause    -  Primary          Depression Screening and Follow-up Plan: Patient was screened for depression during today's encounter  They screened negative with a PHQ-2 score of 0  Preventive health issues were discussed with patient, and age appropriate screening tests were ordered as noted in patient's After Visit Summary  Personalized health advice and appropriate referrals for health education or preventive services given if needed, as noted in patient's After Visit Summary       History of Present Illness:     Patient presents for Medicare Annual Wellness visit    Patient Care Team:  Celeste Cunha PA-C as PCP - General (Family Medicine)     Problem List:     Patient Active Problem List   Diagnosis    Generalized anxiety disorder    Diverticulosis    GERD without esophagitis    Hiatal hernia    Hypercholesterolemia    Essential hypertension    Irritable bowel syndrome    Malignant melanoma of skin ([facility] [address] )    Age-related osteoporosis without current pathological fracture    Squamous cell carcinoma of skin    Splenic artery aneurysm (HCC)    Other shoulder lesions, unspecified shoulder    Other specified disorders of rotator cuff syndrome of shoulder and allied disorders    Healthcare maintenance    Murmur, heart  Dysuria    Medicare annual wellness visit, subsequent    Hair loss    Bilateral impacted cerumen    SOBOE (shortness of breath on exertion)    Nonrheumatic mitral valve regurgitation    Angina pectoris (HCC)    Tick bite    Nonobstructive atherosclerosis of coronary artery      Past Medical and Surgical History:     Past Medical History:   Diagnosis Date    Anxiety     Diverticulosis     GERD (gastroesophageal reflux disease)     Hiatal hernia     Hypertension     Melanoma ([facility] [address] )     Osteoporosis      Past Surgical History:   Procedure Laterality Date    CARDIAC CATHETERIZATION N/A 2/24/2022    Procedure: Cardiac catheterization;  Surgeon: Milton Manzo MD;  Location: [facility]; Service: Cardiology    CARDIAC CATHETERIZATION N/A 2/24/2022    Procedure: Cardiac Coronary Angiogram;  Surgeon: Milton Manzo MD;  Location: [facility];   Service: Cardiology    EGD AND COLONOSCOPY  05/14/2014    NORMAL/HP      Family History:     Family History   Problem Relation Age of Onset    Uterine cancer Mother     Prostate cancer Father     Osteoporosis Sister       Social History:     Social History     Socioeconomic History    Marital status: /Civil Union     Spouse name: None    Number of children: None    Years of education: None    Highest education level: None   Occupational History    Occupation: retired - [employer]    Tobacco Use    Smoking status: Never Smoker    Smokeless tobacco: Never Used    Tobacco comment: No secondhand smoke exposure    Vaping Use    Vaping Use: Never used   Substance and Sexual Activity    Alcohol use: Yes     Comment: very rare socially    Drug use: None    Sexual activity: None   Other Topics Concern    None   Social History Narrative    None     Social Determinants of Health     Financial Resource Strain: Not on file   Food Insecurity: Not on file   Transportation Needs: Not on file   Physical Activity: Not on file   Stress: Not on file   Social Connections: Not on file   Intimate Partner Violence: Not on file   Housing Stability: Not on file      Medications and Allergies:     Current Outpatient Medications   Medication Sig Dispense Refill    ALPRAZolam (XANAX) 0 25 mg tablet Take 1 tablet by mouth daily as needed      amLODIPine (NORVASC) 2 5 mg tablet TAKE 1 TABLET BY MOUTH DAILY 90 tablet 3    atorvastatin (LIPITOR) 20 mg tablet Take 1 tablet (20 mg total) by mouth daily 90 tablet 3    b complex vitamins capsule Take 1 capsule by mouth daily      BIOTIN PO Take by mouth      calcium carbonate (Calcium 600) 600 MG tablet Take 600 mg by mouth 2 (two) times a day with meals      Cholecalciferol (VITAMIN D3) 1000 units CAPS Take 2 capsules by mouth daily      clopidogrel (PLAVIX) 75 mg tablet TAKE 1 TABLET(75 MG) BY MOUTH DAILY 90 tablet 3    Lactobacillus Rhamnosus, GG, (CULTURELLE PO) Take by mouth daily      Vitamin Mixture ([place]len[place]-C) 500-60 MG TABS Take by mouth       No current facility-administered medications for this visit  Allergies   Allergen Reactions    Macrobid [Nitrofurantoin] GI Intolerance    Penicillins Other (See Comments)     Childhood   Per pt, unsure of reaction    Protonix [Pantoprazole] Nausea Only    Wound Dressing Adhesive Other (See Comments)     bandaid if on for more than 24 hours      Immunizations:     Immunization History   Administered Date(s) Administered    COVID-19 PFIZER VACCINE 0 3 ML IM 03/11/2021, 04/01/2021, 10/18/2021    Influenza Split High Dose Preservative Free IM 10/06/2014, 10/01/2015, 10/05/2016, 10/12/2017, 10/18/2019    Influenza, high dose seasonal 0 7 mL 09/27/2018, 10/08/2020    Influenza, seasonal, injectable 1945, 11/05/2012, 10/31/2013    Pneumococcal Conjugate 13-Valent 09/27/2018    Pneumococcal Polysaccharide PPV23 03/04/2020    Zoster 11/23/2014    Zoster Vaccine Recombinant 11/23/2014      Health Maintenance:         Topic Date Due  Breast Cancer Screening: Mammogram  07/23/2022    Colorectal Cancer Screening  11/16/2024    Hepatitis C Screening  Completed         Topic Date Due    DTaP,Tdap,and Td Vaccines (1 - Tdap) Never done    Influenza Vaccine (1) 09/01/2021      Medicare Health Risk Assessment:     /58 (BP Location: Right arm, Patient Position: Sitting)   Pulse 65   Ht 5' 4" (1 626 m)   Wt 59 2 kg (130 lb 9 6 oz)   SpO2 98%   BMI 22 42 kg/m²      Cris Gunn is here for her Subsequent Wellness visit  Health Risk Assessment:   Patient rates overall health as good  Patient feels that their physical health rating is same  Patient is satisfied with their life  Eyesight was rated as slightly worse  Hearing was rated as same  Patient feels that their emotional and mental health rating is same  Patients states they are sometimes angry  Patient states they are sometimes unusually tired/fatigued  Pain experienced in the last 7 days has been none  Patient states that she has experienced no weight loss or gain in last 6 months  Depression Screening:   PHQ-2 Score: 0      Fall Risk Screening: In the past year, patient has experienced: no history of falling in past year      Urinary Incontinence Screening:   Patient has not leaked urine accidently in the last six months  Home Safety:  Patient does not have trouble with stairs inside or outside of their home  Patient has working smoke alarms and has no working carbon monoxide detector  Home safety hazards include: none  Nutrition:   Current diet is Regular  Medications:   Patient is currently taking over-the-counter supplements  OTC medications include: see medication list  Patient is able to manage medications  Activities of Daily Living (ADLs)/Instrumental Activities of Daily Living (IADLs):   Walk and transfer into and out of bed and chair?: Yes  Dress and groom yourself?: Yes    Bathe or shower yourself?: Yes    Feed yourself?  Yes  Do your laundry/housekeeping?: Yes  Manage your money, pay your bills and track your expenses?: Yes  Make your own meals?: Yes    Do your own shopping?: Yes    Previous Hospitalizations:   Any hospitalizations or ED visits within the last 12 months?: No      Advance Care Planning:   Living will: Yes    Durable POA for healthcare: Yes    Advanced directive: Yes    Five wishes given: Yes      Cognitive Screening:   Provider or family/friend/caregiver concerned regarding cognition?: No    PREVENTIVE SCREENINGS      Cardiovascular Screening:    General: Screening Not Indicated, History Lipid Disorder and Screening Current      Diabetes Screening:     General: Screening Current      Colorectal Cancer Screening:     General: Screening Current      Breast Cancer Screening:     General: Screening Current      Cervical Cancer Screening:    General: Screening Not Indicated      Osteoporosis Screening:    General: Screening Not Indicated and History Osteoporosis    Due for: Bone Density Ultrasound      Abdominal Aortic Aneurysm (AAA) Screening:        General: Screening Not Indicated      Lung Cancer Screening:     General: Screening Not Indicated      Hepatitis C Screening:    General: Screening Current    Screening, Brief Intervention, and Referral to Treatment (SBIRT)    Screening  Typical number of drinks in a day: 0  Typical number of drinks in a week: 0  Interpretation: Low risk drinking behavior  AUDIT-C Screenin) How often did you have a drink containing alcohol in the past year? never  2) How many drinks did you have on a typical day when you were drinking in the past year? 0  3) How often did you have 6 or more drinks on one occasion in the past year? never    AUDIT-C Score: 0  Interpretation: Score 0-2 (female): Negative screen for alcohol misuse    Single Item Drug Screening:  How often have you used an illegal drug (including marijuana) or a prescription medication for non-medical reasons in the past year? never    Single Item Drug Screen Score: 0  Interpretation: Negative screen for possible drug use disorder      Nathaniel Valerio PA-C

## 2022-03-25 NOTE — ASSESSMENT & PLAN NOTE
Blue pack in 5 wishes given in the past patient encouraged to bring her living will to put under her ACP tab  Pneumonia and COVID vaccines up-to-date encouraged to get new shingles vaccine at the pharmacy  Blood work up-to-date  Bone density is due and ordered

## 2022-03-25 NOTE — PATIENT INSTRUCTIONS
Problem List Items Addressed This Visit        Digestive    GERD without esophagitis     Stable off PPI  Cardiovascular and Mediastinum    Essential hypertension     Stable continue Norvasc         Relevant Orders    CBC and differential    Comprehensive metabolic panel    Angina pectoris (HCC)     Currently stable  Nonobstructive atherosclerosis of coronary artery     Patient has been evaluated by Cardiology is on Plavix 75 milligrams once daily and ASA 81 daily  Sees cardio and has f/u in June  Stopped asa as she thought it was from the asa  May need to get off plavix  Musculoskeletal and Integument    Malignant melanoma of skin [facility])     Continue dermatology follow-up  Age-related osteoporosis without current pathological fracture     DEXA is due to order 7 placed in the past and patient has not gone  Order again placed  Encouraged to go  Relevant Orders    Vitamin D 25 hydroxy       Other    Generalized anxiety disorder     Stable on as needed Xanax which is infrequent  Last prescription was 2018  Refills given  Relevant Medications    ALPRAZolam (XANAX) 0 25 mg tablet    Hypercholesterolemia     Patient is on Lipitor 20 milligrams once daily which is keeping her LDL at goal at 53 which down from 132 one month ago  continue and recheck 6 months  Has cardio to f/u  Relevant Orders    Lipid Panel with Direct LDL reflex    Medicare annual wellness visit, subsequent     Blue pack in 5 wishes given in the past patient encouraged to bring her living will to put under her ACP tab  Pneumonia and COVID vaccines up-to-date encouraged to get new shingles vaccine at the pharmacy  Blood work up-to-date  Bone density is due and ordered             Other Visit Diagnoses     Menopause    -  Primary    Relevant Orders    DXA bone density spine hip and pelvis

## 2022-03-25 NOTE — ASSESSMENT & PLAN NOTE
DEXA is due to order 7 placed in the past and patient has not gone  Order again placed  Encouraged to go

## 2022-03-25 NOTE — PROGRESS NOTES
Assessment and Plan:    Problem List Items Addressed This Visit        Digestive    GERD without esophagitis     Stable off PPI  Cardiovascular and Mediastinum    Essential hypertension     Stable continue Norvasc         Relevant Orders    CBC and differential    Comprehensive metabolic panel    Angina pectoris (HCC)     Currently stable  Nonobstructive atherosclerosis of coronary artery     Patient has been evaluated by Cardiology is on Plavix 75 milligrams once daily and ASA 81 daily  Sees cardio and has f/u in June  Stopped asa as she thought it was from the asa  May need to get off plavix  Musculoskeletal and Integument    Malignant melanoma of skin [facility])     Continue dermatology follow-up  Age-related osteoporosis without current pathological fracture     DEXA is due to order 7 placed in the past and patient has not gone  Order again placed  Encouraged to go  Relevant Orders    Vitamin D 25 hydroxy       Other    Generalized anxiety disorder     Stable on as needed Xanax which is infrequent  Last prescription was 2018  Refills given  Relevant Medications    ALPRAZolam (XANAX) 0 25 mg tablet    Hypercholesterolemia     Patient is on Lipitor 20 milligrams once daily which is keeping her LDL at goal at 53 which down from 132 one month ago  continue and recheck 6 months  Has cardio to f/u  Relevant Orders    Lipid Panel with Direct LDL reflex    Medicare annual wellness visit, subsequent     Blue pack in 5 wishes given in the past patient encouraged to bring her living will to put under her ACP tab  Pneumonia and COVID vaccines up-to-date encouraged to get new shingles vaccine at the pharmacy  Blood work up-to-date  Bone density is due and ordered             Other Visit Diagnoses     Menopause    -  Primary    Relevant Orders    DXA bone density spine hip and pelvis                 Diagnoses and all orders for this visit:    Menopause  - DXA bone density spine hip and pelvis; Future    Age-related osteoporosis without current pathological fracture  -     Vitamin D 25 hydroxy; Future    Malignant melanoma of skin (HCC)    Essential hypertension  -     CBC and differential; Future  -     Comprehensive metabolic panel; Future    Medicare annual wellness visit, subsequent    Hypercholesterolemia  -     Lipid Panel with Direct LDL reflex; Future    Generalized anxiety disorder  -     ALPRAZolam (XANAX) 0 25 mg tablet; Take 1 tablet (0 25 mg total) by mouth daily as needed for anxiety    Nonobstructive atherosclerosis of coronary artery    Angina pectoris (HCC)    GERD without esophagitis              Subjective:      Patient ID: Claudia Vinson is a 78 y o  female  CC:    Chief Complaint   Patient presents with    Follow-up    Hypertension    Medicare Wellness Visit       HPI:      Claudia Vinson is here for chronic conditions f/u including the diagnosis of Menopause  (primary encounter diagnosis)  Age-related osteoporosis without current pathological fracture  Malignant melanoma of skin (hcc)  Essential hypertension  Medicare annual wellness visit, subsequent  Hypercholesterolemia  Generalized anxiety disorder   Pt  states they are taking all medications as directed without complaints or side effects   Pt  had labs done prior to today's visit which included Recent Results (from the past 672 hour(s))  -Basic metabolic panel:   Collection Time: 02/26/22  7:54 AM       Result                      Value             Ref Range           Sodium                      139               136 - 145 mm*       Potassium                   3 9               3 5 - 5 3 mm*       Chloride                    103               100 - 108 mm*       CO2                         30                21 - 32 mmol*       ANION GAP                   6                 4 - 13 mmol/L       BUN                         19                5 - 25 mg/dL        Creatinine 0 72              0 60 - 1 30 *       Glucose                     94                65 - 140 mg/*       Calcium                     8 9               8 3 - 10 1 m*       eGFR                        82                ml/min/1 73s*  -Hepatitis C Antibody ([facility]):   Collection Time: 03/11/22  7:39 AM       Result                      Value             Ref Range           Hepatitis C Ab              Non-reactive      Non-reactive   -Lipid panel:   Collection Time: 03/11/22  7:39 AM       Result                      Value             Ref Range           Cholesterol                 117               See Comment *       Triglycerides               67                See Comment *       HDL, Direct                 51                >=50 mg/dL          LDL Calculated              53                0 - 100 mg/dL       Non-HDL-Chol (CHOL-HDL)     66                mg/dl          -Comprehensive metabolic panel:   Collection Time: 03/11/22  7:39 AM       Result                      Value             Ref Range           Sodium                      138               136 - 145 mm*       Potassium                   4 2               3 5 - 5 3 mm*       Chloride                    107               100 - 108 mm*       CO2                         28                21 - 32 mmol*       ANION GAP                   3 (L)             4 - 13 mmol/L       BUN                         21                5 - 25 mg/dL        Creatinine                  0 73              0 60 - 1 30 *       Glucose, Fasting            97                65 - 99 mg/dL       Calcium                     9 1               8 3 - 10 1 m*       AST                         18                5 - 45 U/L          ALT                         33                12 - 78 U/L         Alkaline Phosphatase        82                46 - 116 U/L        Total Protein               7 5               6 4 - 8 2 g/*       Albumin                     3 8               3 5 - 5 0 g/*       Total Bilirubin             0 50              0 20 - 1 00 *       eGFR                        80                ml/min/1 73s*  -Vitamin D 25 hydroxy:   Collection Time: 03/11/22  7:39 AM       Result                      Value             Ref Range           Vit D, 25-Hydroxy           66 2              30 0 - 100 0*        The following portions of the patient's history were reviewed and updated as appropriate: allergies, current medications, past family history, past medical history, past social history, past surgical history and problem list       Review of Systems   Constitutional: Negative  HENT: Negative  Eyes: Negative  Respiratory: Negative  Cardiovascular: Negative  Gastrointestinal: Negative  Endocrine: Negative  Genitourinary: Negative  Musculoskeletal: Negative  Skin: Negative  Allergic/Immunologic: Negative  Neurological: Negative  Hematological: Negative  Psychiatric/Behavioral: Negative  Data to review:       Objective:    Vitals:    03/25/22 1058   BP: 130/58   BP Location: Right arm   Patient Position: Sitting   Pulse: 65   SpO2: 98%   Weight: 59 2 kg (130 lb 9 6 oz)   Height: 5' 4" (1 626 m)        Physical Exam  Vitals and nursing note reviewed  Constitutional:       Appearance: Normal appearance  She is well-developed  HENT:      Head: Normocephalic and atraumatic  Eyes:      General: Lids are normal       Conjunctiva/sclera: Conjunctivae normal       Pupils: Pupils are equal, round, and reactive to light  Cardiovascular:      Rate and Rhythm: Normal rate and regular rhythm  Heart sounds: Murmur heard  Systolic murmur is present with a grade of 3/6  Comments: Referred murmur to carodits wei  Pulmonary:      Effort: Pulmonary effort is normal       Breath sounds: Normal breath sounds  Musculoskeletal:      Right lower leg: No edema  Left lower leg: No edema  Skin:     General: Skin is warm and dry  Neurological:      General: No focal deficit present  Mental Status: She is alert  Coordination: Coordination is intact  Psychiatric:         Mood and Affect: Mood normal          Behavior: Behavior normal  Behavior is cooperative  Thought Content: Thought content normal          Judgment: Judgment normal              Depression Screening and Follow-up Plan: Patient was screened for depression during today's encounter  They screened negative with a PHQ-2 score of 0

## 2022-03-25 NOTE — ASSESSMENT & PLAN NOTE
Patient has been evaluated by Cardiology is on Plavix 75 milligrams once daily and ASA 81 daily  Sees cardio and has f/u in June  Stopped asa as she thought it was from the asa  May need to get off plavix

## 2022-03-25 NOTE — ASSESSMENT & PLAN NOTE
Patient is on Lipitor 20 milligrams once daily which is keeping her LDL at goal at 53 which down from 132 one month ago  continue and recheck 6 months  Has cardio to f/u

## 2022-05-19 ENCOUNTER — IMMUNIZATIONS (OUTPATIENT)
Dept: FAMILY MEDICINE CLINIC | Facility: CLINIC | Age: 77
End: 2022-05-19
Payer: MEDICARE

## 2022-05-19 DIAGNOSIS — Z23 COVID-19 VACCINE ADMINISTERED: Primary | ICD-10-CM

## 2022-05-19 PROCEDURE — 91305 PR SARSCOV2 VACCINE 30MCG/0.3ML TRIS-SUCROSE IM USE: CPT

## 2022-05-19 PROCEDURE — 0054A PR IMM ADMN SARSCOV2 30MCG/0.3ML TRIS-SUCROSE BST: CPT

## 2022-05-23 DIAGNOSIS — I10 HYPERTENSION, UNSPECIFIED TYPE: ICD-10-CM

## 2022-05-23 RX ORDER — AMLODIPINE BESYLATE 2.5 MG/1
TABLET ORAL
Qty: 90 TABLET | Refills: 3 | Status: SHIPPED | OUTPATIENT
Start: 2022-05-23

## 2022-05-23 NOTE — TELEPHONE ENCOUNTER
Requested Prescriptions     Pending Prescriptions Disp Refills    amLODIPine (NORVASC) 2 5 mg tablet [Pharmacy Med Name: AMLODIPINE BESYLATE 2 5MG TABLETS] 90 tablet 3     Sig: TAKE 1 TABLET(2 5 MG) BY MOUTH DAILY     LOV 3/25/22, F/U 10/3/22, Labs pending

## 2022-06-14 ENCOUNTER — APPOINTMENT (OUTPATIENT)
Dept: LAB | Facility: CLINIC | Age: 77
End: 2022-06-14
Payer: MEDICARE

## 2022-06-14 DIAGNOSIS — E78.00 HYPERCHOLESTEROLEMIA: ICD-10-CM

## 2022-06-14 LAB
CHOLEST SERPL-MCNC: 122 MG/DL
HDLC SERPL-MCNC: 52 MG/DL
LDLC SERPL CALC-MCNC: 58 MG/DL (ref 0–100)
TRIGL SERPL-MCNC: 61 MG/DL

## 2022-06-14 PROCEDURE — 36415 COLL VENOUS BLD VENIPUNCTURE: CPT

## 2022-06-14 PROCEDURE — 80061 LIPID PANEL: CPT

## 2022-06-20 ENCOUNTER — OFFICE VISIT (OUTPATIENT)
Dept: CARDIOLOGY CLINIC | Facility: CLINIC | Age: 77
End: 2022-06-20
Payer: MEDICARE

## 2022-06-20 VITALS
HEIGHT: 64 IN | DIASTOLIC BLOOD PRESSURE: 70 MMHG | BODY MASS INDEX: 21.89 KG/M2 | SYSTOLIC BLOOD PRESSURE: 153 MMHG | OXYGEN SATURATION: 99 % | HEART RATE: 78 BPM | WEIGHT: 128.2 LBS

## 2022-06-20 DIAGNOSIS — I10 ESSENTIAL HYPERTENSION: ICD-10-CM

## 2022-06-20 DIAGNOSIS — E78.00 HYPERCHOLESTEROLEMIA: ICD-10-CM

## 2022-06-20 DIAGNOSIS — I34.0 NONRHEUMATIC MITRAL VALVE REGURGITATION: ICD-10-CM

## 2022-06-20 DIAGNOSIS — I25.10 NONOBSTRUCTIVE ATHEROSCLEROSIS OF CORONARY ARTERY: Primary | ICD-10-CM

## 2022-06-20 DIAGNOSIS — I20.9 ANGINA PECTORIS (HCC): ICD-10-CM

## 2022-06-20 PROCEDURE — 99214 OFFICE O/P EST MOD 30 MIN: CPT | Performed by: INTERNAL MEDICINE

## 2022-06-20 NOTE — PROGRESS NOTES
[facility]  [address] [facility], [place] [address] [facility]  Phone#  [phone]   Fax#  [phone]  *-*-*-*-*-*-*-*-*-*-*-*-*-*-*-*-*-*-*-*-*-*-*-*-*-*-*-*-*-*-*-*-*-*-*-*-*-*-*-*-*-*-*-*-*-*-*-*-*-*-*-*-*-*                                   Cardiology Follow Up      ENCOUNTER DATE: 22 8:26 AM  PATIENT NAME: Tamica Glass   : 1945    MRN: [id number]  AGE:77 y o  SEX: female  [address] MD     PRIMARY CARE PHYSICIAN: Benjamin Campos PA-C    ACTIVE DIAGNOSIS THIS VISIT  1  Nonobstructive atherosclerosis of coronary artery     2  Angina pectoris ([address] )     3  Hypercholesterolemia     4  Nonrheumatic mitral valve regurgitation     5  Essential hypertension       ACTIVE PROBLEM LIST  Patient Active Problem List   Diagnosis    Generalized anxiety disorder    Diverticulosis    GERD without esophagitis    Hiatal hernia    Hypercholesterolemia    Essential hypertension    Irritable bowel syndrome    Malignant melanoma of skin ([address] )    Age-related osteoporosis without current pathological fracture    Squamous cell carcinoma of skin    Splenic artery aneurysm (HCC)    Other shoulder lesions, unspecified shoulder    Other specified disorders of rotator cuff syndrome of shoulder and allied disorders    Healthcare maintenance    Murmur, heart    Medicare annual wellness visit, subsequent    Hair loss    Bilateral impacted cerumen    Nonrheumatic mitral valve regurgitation    Angina pectoris ([address] )    Nonobstructive atherosclerosis of coronary artery       CARDIOLOGY SPECIALTY COMMENTS  Patient was 1st seen on 2021   Patient is a is a 79-year-old female who developed symptoms of chest tightness radiating into her neck and jaw with shortness of breath on physical exertion such as going up a flight of stairs   The discomfort would melba with rest  Jack Lopes primary care physician ordered a stress echocardiogram which was nondiagnostic and equivocal for myocardial ischemia   Mild mitral regurgitation was noted on the echocardiogram   Patient states that since the stress test, her exertional chest tightness and shortness of breath has been much less severe   Patient has no family history of coronary artery disease   A sister has a murmur  Denny Iniguez is a lifetime nonsmoker  06/23/2021 nuclear stress test: Resting hypertension with exaggerated hypertensive response to exercise negative treadmill stress test for angina pectoris but with abnormal ST depression with exercise  Exercise induced ventricular ectopy  Normal LV systolic function, EF 55%  Normal tomographic perfusion series  06/01/2021 echocardiogram:  Normal LV function EF 60%, mild concentric LVH  Grade 1 diastolic dysfunction  Mild-to-moderate left atrial enlargement  Moderate mitral regurgitation  02/24/2022 cardiac catheterization: Ostial circumflex lesion 50% stenosis  INTERVAL HISTORY:        Patient is doing very well  Her chest discomfort resolved spontaneously  She did not stop her aspirin or start Plavix  Her blood pressure is high today but most the time at home it runs under 769 systolic  She is tolerating the atorvastatin 20 mg daily and her cholesterol is still good  She has no cardiac complaints  Patient denies chest discomfort or shortness of breath  Patient has no palpitations  Patient denies symptoms of dizziness, lightheadedness or near-syncope/syncope  Patient denies leg edema  Patient denies symptoms of orthopnea or paroxysmal nocturnal dyspnea  DISCUSSION/PLAN:          1  Do not start Plavix  2  If chest discomfort returns, try a trial of several weeks off aspirin to see if possibly the discomfort is from gastric irritation from the aspirin  3  If patient to stay off aspirin for prolonged period of time, would recommend beginning Plavix 75 mg daily  4   Return in 6 months     Lab Studies:    Lab Results   Component Value Date    CHOLESTEROL 122 06/14/2022 CHOLESTEROL 117 03/11/2022    CHOLESTEROL 203 (H) 02/11/2022     Lab Results   Component Value Date    TRIG 61 06/14/2022    TRIG 67 03/11/2022    TRIG 63 02/11/2022     Lab Results   Component Value Date    HDL 52 06/14/2022    HDL 51 03/11/2022    HDL 58 02/11/2022     Lab Results   Component Value Date    LDLCALC 58 06/14/2022    LDLCALC 53 03/11/2022    LDLCALC 132 (H) 02/11/2022       Lab Results   Component Value Date    LDLDIRECT 112 05/07/2014       Lab Results   Component Value Date    EGFR 80 03/11/2022    EGFR 82 02/26/2022    EGFR 72 02/24/2022    SODIUM 138 03/11/2022    SODIUM 139 02/26/2022    SODIUM 140 02/24/2022    K 4 2 03/11/2022    K 3 9 02/26/2022    K 4 0 02/24/2022     03/11/2022     02/26/2022     02/24/2022    CO2 28 03/11/2022    CO2 30 02/26/2022    CO2 28 02/24/2022    ANIONGAP 5 05/07/2014    BUN 21 03/11/2022    BUN 19 02/26/2022    BUN 19 02/24/2022    CREATININE 0 73 03/11/2022    CREATININE 0 72 02/26/2022    CREATININE 0 80 02/24/2022     Lab Results   Component Value Date    WBC 6 52 02/11/2022    WBC 6 44 03/10/2021    WBC 6 02 03/03/2020    HGB 14 7 02/11/2022    HGB 14 1 03/10/2021    HGB 13 5 03/03/2020    HCT 45 5 02/11/2022    HCT 43 2 03/10/2021    HCT 42 0 03/03/2020    MCV 94 02/11/2022    MCV 92 03/10/2021    MCV 93 03/03/2020    MCH 30 4 02/11/2022    MCH 30 1 03/10/2021    MCH 29 9 03/03/2020    MCHC 32 3 02/11/2022    MCHC 32 6 03/10/2021    MCHC 32 1 03/03/2020     02/11/2022     03/10/2021     03/03/2020      Lab Results   Component Value Date    GLUCOSE 98 05/07/2014    CALCIUM 9 1 03/11/2022    CALCIUM 8 9 02/26/2022    CALCIUM 9 3 02/24/2022    AST 18 03/11/2022    AST 14 03/10/2021    AST 13 03/03/2020    ALT 33 03/11/2022    ALT 35 03/10/2021    ALT 24 03/03/2020    ALKPHOS 82 03/11/2022    ALKPHOS 87 03/10/2021    ALKPHOS 73 03/03/2020    PROT 7 4 05/07/2014    BILITOT 0 37 05/07/2014     No results found for this visit on 06/20/22  Current Outpatient Medications:     ALPRAZolam (XANAX) 0 25 mg tablet, Take 1 tablet (0 25 mg total) by mouth daily as needed for anxiety, Disp: 30 tablet, Rfl: 0    amLODIPine (NORVASC) 2 5 mg tablet, TAKE 1 TABLET(2 5 MG) BY MOUTH DAILY, Disp: 90 tablet, Rfl: 3    ASPIRIN 81 PO, Take 81 mg by mouth in the morning, Disp: , Rfl:     atorvastatin (LIPITOR) 20 mg tablet, Take 1 tablet (20 mg total) by mouth daily, Disp: 90 tablet, Rfl: 3    b complex vitamins capsule, Take 1 capsule by mouth daily, Disp: , Rfl:     BIOTIN PO, Take by mouth, Disp: , Rfl:     calcium carbonate (OS-BRYCE) 600 MG tablet, Take 600 mg by mouth 2 (two) times a day with meals, Disp: , Rfl:     Cholecalciferol (VITAMIN D3) 1000 units CAPS, Take 2 capsules by mouth daily, Disp: , Rfl:     Lactobacillus Rhamnosus, GG, (CULTURELLE PO), Take by mouth daily, Disp: , Rfl:     Vitamin Mixture ([place]-C) 500-60 MG TABS, Take by mouth, Disp: , Rfl:     clopidogrel (PLAVIX) 75 mg tablet, TAKE 1 TABLET(75 MG) BY MOUTH DAILY (Patient not taking: Reported on 6/20/2022), Disp: 90 tablet, Rfl: 3  Allergies   Allergen Reactions    Macrobid [Nitrofurantoin] GI Intolerance    Penicillins Other (See Comments)     Childhood   Per pt, unsure of reaction    Protonix [Pantoprazole] Nausea Only    Wound Dressing Adhesive Other (See Comments)     bandaid if on for more than 24 hours       Past Medical History:   Diagnosis Date    Anxiety     Diverticulosis     GERD (gastroesophageal reflux disease)     Hiatal hernia     Hypertension     Melan[address] ([facility] [address] )     Osteoporosis      Social History     Socioeconomic History    Marital status: /Civil Union     Spouse name: Not on file    Number of children: Not on file    Years of education: Not on file    Highest education level: Not on file   Occupational History    Occupation: retired - [employer]    Tobacco Use    Smoking status: Never Smoker    Smokeless tobacco: Never Used    Tobacco comment: No secondhand smoke exposure    Vaping Use    Vaping Use: Never used   Substance and Sexual Activity    Alcohol use: Yes     Comment: very rare socially    Drug use: Not on file    Sexual activity: Not on file   Other Topics Concern    Not on file   Social History Narrative    Not on file     Social Determinants of Health     Financial Resource Strain: Not on file   Food Insecurity: Not on file   Transportation Needs: Not on file   Physical Activity: Not on file   Stress: Not on file   Social Connections: Not on file   Intimate Partner Violence: Not on file   Housing Stability: Not on file      Family History   Problem Relation Age of Onset    Uterine cancer Mother     Prostate cancer Father     Osteoporosis Sister      Past Surgical History:   Procedure Laterality Date    CARDIAC CATHETERIZATION N/A 2/24/2022    Procedure: Cardiac catheterization;  Surgeon: Gregoria Murphy MD;  Location: [facility]; Service: Cardiology    CARDIAC CATHETERIZATION N/A 2/24/2022    Procedure: Cardiac Coronary Angiogram;  Surgeon: Gregoria Murhpy MD;  Location: [facility]; Service: Cardiology    EGD AND COLONOSCOPY  05/14/2014    NORMAL/HP       PREVIOUS WEIGHTS:   Wt Readings from Last 10 Encounters:   06/20/22 58 2 kg (128 lb 3 2 oz)   03/25/22 59 2 kg (130 lb 9 6 oz)   03/17/22 59 9 kg (132 lb)   02/24/22 58 8 kg (129 lb 10 1 oz)   02/10/22 58 7 kg (129 lb 6 4 oz)   11/11/21 59 7 kg (131 lb 9 6 oz)   09/13/21 59 9 kg (132 lb)   08/03/21 59 1 kg (130 lb 3 2 oz)   07/07/21 59 4 kg (131 lb)   06/11/21 59 8 kg (131 lb 12 8 oz)        Review of Systems:  Review of Systems   Respiratory: Negative for cough, choking, chest tightness, shortness of breath and wheezing  Cardiovascular: Negative for chest pain, palpitations and leg swelling  Musculoskeletal: Negative for gait problem  Skin: Negative for rash     Neurological: Negative for dizziness, tremors, syncope, weakness, light-headedness, numbness and headaches  Psychiatric/Behavioral: Negative for agitation and behavioral problems  The patient is not hyperactive  Physical Exam:  /70   Pulse 78   Ht 5' 4" (1 626 m)   Wt 58 2 kg (128 lb 3 2 oz)   SpO2 99%   BMI 22 01 kg/m²     Physical Exam  Constitutional:       General: She is not in acute distress  Appearance: She is well-developed  HENT:      Head: Normocephalic and atraumatic  Neck:      Thyroid: No thyromegaly  Vascular: No carotid bruit or JVD  Trachea: No tracheal deviation  Cardiovascular:      Rate and Rhythm: Normal rate and regular rhythm  Pulses: Normal pulses  Heart sounds: Murmur heard  Systolic murmur is present with a grade of 2/6  No friction rub  No gallop  Pulmonary:      Effort: Pulmonary effort is normal  No respiratory distress  Breath sounds: Normal breath sounds  No wheezing, rhonchi or rales  Chest:      Chest wall: No tenderness  Musculoskeletal:         General: Normal range of motion  Cervical back: Normal range of motion and neck supple  Right lower leg: No edema  Left lower leg: No edema  Skin:     General: Skin is warm and dry  Neurological:      General: No focal deficit present  Mental Status: She is alert and oriented to person, place, and time  Psychiatric:         Mood and Affect: Mood normal          Behavior: Behavior normal          Thought Content: Thought content normal          Judgment: Judgment normal        ======================================================  Imaging:   I have personally reviewed pertinent reports  Portions of the record may have been created with voice recognition software  Occasional wrong word or "sound a like" substitutions may have occurred due to the inherent limitations of voice recognition software  Read the chart carefully and recognize, using context, where substitutions have occurred      SIGNATURES: Ellis Alamo MD

## 2022-08-01 ENCOUNTER — TELEPHONE (OUTPATIENT)
Dept: FAMILY MEDICINE CLINIC | Facility: CLINIC | Age: 77
End: 2022-08-01

## 2022-08-01 DIAGNOSIS — R92.8 ABNORMAL MAMMOGRAM: Primary | ICD-10-CM

## 2022-08-01 NOTE — RESULT ENCOUNTER NOTE
Patient already knows that she has an abnormal mammogram as she has follow-up for further imaging on the 3rd of this month. However this mammogram that has been screened in is not able to be visualized in any view that is scanned into the computer. Can we please call and have mammogram recent over and scanned in her chart please. Thank you.

## 2022-08-01 NOTE — TELEPHONE ENCOUNTER
Blanco from [facility] called in requesting a referral for LEFT BREAST DIAGNOSTIC MAMMOGRAM W/ CAD AND LEFT BREAST ULTRASOUND LIMITED   DIAGNOSIS CODE- R92 8  PLEASE FAX TO [facility] -[phone] PT HAS AN APPT ON 8/3/22

## 2022-08-04 NOTE — RESULT ENCOUNTER NOTE
Please let pt know that her additional breast imaging was normal/benign  She can go back to her normal yearly bilateral screening mammos next year  Thank you

## 2022-09-20 ENCOUNTER — APPOINTMENT (OUTPATIENT)
Dept: LAB | Facility: CLINIC | Age: 77
End: 2022-09-20
Payer: MEDICARE

## 2022-09-20 DIAGNOSIS — E78.00 HYPERCHOLESTEROLEMIA: ICD-10-CM

## 2022-09-20 DIAGNOSIS — M81.0 AGE-RELATED OSTEOPOROSIS WITHOUT CURRENT PATHOLOGICAL FRACTURE: ICD-10-CM

## 2022-09-20 DIAGNOSIS — I10 ESSENTIAL HYPERTENSION: ICD-10-CM

## 2022-09-20 LAB
25(OH)D3 SERPL-MCNC: 66.9 NG/ML (ref 30–100)
ALBUMIN SERPL BCP-MCNC: 3.7 G/DL (ref 3.5–5)
ALP SERPL-CCNC: 87 U/L (ref 46–116)
ALT SERPL W P-5'-P-CCNC: 34 U/L (ref 12–78)
ANION GAP SERPL CALCULATED.3IONS-SCNC: 2 MMOL/L (ref 4–13)
AST SERPL W P-5'-P-CCNC: 17 U/L (ref 5–45)
BASOPHILS # BLD AUTO: 0.04 THOUSANDS/ΜL (ref 0–0.1)
BASOPHILS NFR BLD AUTO: 1 % (ref 0–1)
BILIRUB SERPL-MCNC: 0.57 MG/DL (ref 0.2–1)
BUN SERPL-MCNC: 24 MG/DL (ref 5–25)
CALCIUM SERPL-MCNC: 9.3 MG/DL (ref 8.3–10.1)
CHLORIDE SERPL-SCNC: 108 MMOL/L (ref 96–108)
CHOLEST SERPL-MCNC: 129 MG/DL
CO2 SERPL-SCNC: 29 MMOL/L (ref 21–32)
CREAT SERPL-MCNC: 0.79 MG/DL (ref 0.6–1.3)
EOSINOPHIL # BLD AUTO: 0.12 THOUSAND/ΜL (ref 0–0.61)
EOSINOPHIL NFR BLD AUTO: 2 % (ref 0–6)
ERYTHROCYTE [DISTWIDTH] IN BLOOD BY AUTOMATED COUNT: 13.5 % (ref 11.6–15.1)
GFR SERPL CREATININE-BSD FRML MDRD: 73 ML/MIN/1.73SQ M
GLUCOSE P FAST SERPL-MCNC: 92 MG/DL (ref 65–99)
HCT VFR BLD AUTO: 41.2 % (ref 34.8–46.1)
HDLC SERPL-MCNC: 53 MG/DL
HGB BLD-MCNC: 13.5 G/DL (ref 11.5–15.4)
IMM GRANULOCYTES # BLD AUTO: 0.02 THOUSAND/UL (ref 0–0.2)
IMM GRANULOCYTES NFR BLD AUTO: 0 % (ref 0–2)
LDLC SERPL CALC-MCNC: 64 MG/DL (ref 0–100)
LYMPHOCYTES # BLD AUTO: 2.05 THOUSANDS/ΜL (ref 0.6–4.47)
LYMPHOCYTES NFR BLD AUTO: 40 % (ref 14–44)
MCH RBC QN AUTO: 30.5 PG (ref 26.8–34.3)
MCHC RBC AUTO-ENTMCNC: 32.8 G/DL (ref 31.4–37.4)
MCV RBC AUTO: 93 FL (ref 82–98)
MONOCYTES # BLD AUTO: 0.35 THOUSAND/ΜL (ref 0.17–1.22)
MONOCYTES NFR BLD AUTO: 7 % (ref 4–12)
NEUTROPHILS # BLD AUTO: 2.52 THOUSANDS/ΜL (ref 1.85–7.62)
NEUTS SEG NFR BLD AUTO: 50 % (ref 43–75)
NRBC BLD AUTO-RTO: 0 /100 WBCS
PLATELET # BLD AUTO: 249 THOUSANDS/UL (ref 149–390)
PMV BLD AUTO: 9.9 FL (ref 8.9–12.7)
POTASSIUM SERPL-SCNC: 4.2 MMOL/L (ref 3.5–5.3)
PROT SERPL-MCNC: 7.4 G/DL (ref 6.4–8.4)
RBC # BLD AUTO: 4.42 MILLION/UL (ref 3.81–5.12)
SODIUM SERPL-SCNC: 139 MMOL/L (ref 135–147)
TRIGL SERPL-MCNC: 62 MG/DL
WBC # BLD AUTO: 5.1 THOUSAND/UL (ref 4.31–10.16)

## 2022-09-20 PROCEDURE — 80053 COMPREHEN METABOLIC PANEL: CPT

## 2022-09-20 PROCEDURE — 82306 VITAMIN D 25 HYDROXY: CPT

## 2022-09-20 PROCEDURE — 80061 LIPID PANEL: CPT

## 2022-09-20 PROCEDURE — 36415 COLL VENOUS BLD VENIPUNCTURE: CPT

## 2022-09-20 PROCEDURE — 85025 COMPLETE CBC W/AUTO DIFF WBC: CPT

## 2022-10-03 ENCOUNTER — OFFICE VISIT (OUTPATIENT)
Dept: FAMILY MEDICINE CLINIC | Facility: CLINIC | Age: 77
End: 2022-10-03
Payer: MEDICARE

## 2022-10-03 VITALS
OXYGEN SATURATION: 98 % | DIASTOLIC BLOOD PRESSURE: 60 MMHG | WEIGHT: 128.25 LBS | HEART RATE: 78 BPM | TEMPERATURE: 97.8 F | SYSTOLIC BLOOD PRESSURE: 134 MMHG | HEIGHT: 64 IN | BODY MASS INDEX: 21.89 KG/M2

## 2022-10-03 DIAGNOSIS — F41.1 GENERALIZED ANXIETY DISORDER: ICD-10-CM

## 2022-10-03 DIAGNOSIS — M81.0 AGE-RELATED OSTEOPOROSIS WITHOUT CURRENT PATHOLOGICAL FRACTURE: ICD-10-CM

## 2022-10-03 DIAGNOSIS — E78.00 HYPERCHOLESTEROLEMIA: ICD-10-CM

## 2022-10-03 DIAGNOSIS — C44.92 SQUAMOUS CELL CARCINOMA OF SKIN: ICD-10-CM

## 2022-10-03 DIAGNOSIS — Z23 NEED FOR INFLUENZA VACCINATION: Primary | ICD-10-CM

## 2022-10-03 DIAGNOSIS — I72.8 SPLENIC ARTERY ANEURYSM (HCC): ICD-10-CM

## 2022-10-03 DIAGNOSIS — I10 ESSENTIAL HYPERTENSION: ICD-10-CM

## 2022-10-03 PROBLEM — H61.23 BILATERAL IMPACTED CERUMEN: Status: RESOLVED | Noted: 2021-03-17 | Resolved: 2022-10-03

## 2022-10-03 PROCEDURE — G0008 ADMIN INFLUENZA VIRUS VAC: HCPCS | Performed by: PHYSICIAN ASSISTANT

## 2022-10-03 PROCEDURE — 99213 OFFICE O/P EST LOW 20 MIN: CPT | Performed by: PHYSICIAN ASSISTANT

## 2022-10-03 PROCEDURE — 90662 IIV NO PRSV INCREASED AG IM: CPT | Performed by: PHYSICIAN ASSISTANT

## 2022-10-03 NOTE — ASSESSMENT & PLAN NOTE
DEXA has not been done in years has been ordered multiple times patient not going reprinted again today  Explained the benefit of possible treatment needed  Increase weight-bearing exercises avoidance of caffeine and alcohol  D level very good at 66

## 2022-10-03 NOTE — PATIENT INSTRUCTIONS
Problem List Items Addressed This Visit    None       Visit Diagnoses       Need for influenza vaccination    -  Primary    Relevant Orders    influenza vaccine, high-dose, PF 0 7 mL (FLUZONE HIGH-DOSE)

## 2022-10-03 NOTE — PROGRESS NOTES
Name: Lala Pina      : 1945      MRN: [id number]  Encounter Provider: Bennie Hutchins PA-C  Encounter Date: 10/3/2022   Encounter department: [facility] PRIMARY CARE    Assessment & Plan     1  Need for influenza vaccination  -     influenza vaccine, high-dose, PF 0 7 mL (FLUZONE HIGH-DOSE)    2  Essential hypertension  Assessment & Plan:  Stable  Home readings in the 603'W systolic  Orders:  -     Comprehensive metabolic panel; Future; Expected date: 2023    3  Hypercholesterolemia  Assessment & Plan:  Patient on Lipitor 20 mg once daily keeping her LDL at 64 continue recheck 6 months    Orders:  -     Lipid Panel with Direct LDL reflex; Future; Expected date: 2023    4  Generalized anxiety disorder  Assessment & Plan:  Stable with as needed Xanax only  5  Age-related osteoporosis without current pathological fracture  Assessment & Plan:  DEXA has not been done in years has been ordered multiple times patient not going reprinted again today  Explained the benefit of possible treatment needed  Increase weight-bearing exercises avoidance of caffeine and alcohol  D level very good at 66       6  Splenic artery aneurysm [facility])  Assessment & Plan:  Last CT image was in  with 9 mm finding  Pt does not want to go for any further testing  7  Squamous cell carcinoma of skin  Assessment & Plan:  Recommend Derm regularly  Depression Screening and Follow-up Plan: Patient was screened for depression during today's encounter  They screened negative with a PHQ-2 score of 0  Subjective        Lala Pina is here for chronic conditions f/u including the diagnosis of Need for influenza vaccination  (primary encounter diagnosis)   Pt  states they are taking all medications as directed without complaints or side effects   Pt  had labs done prior to today's visit which included Recent Results (from the past 672 hour(s))  -CBC and differential:   Collection Time: 09/20/22  7:47 AM       Result                      Value             Ref Range           WBC                         5  10              4 31 - 10 16*       RBC                         4 42              3 81 - 5 12 *       Hemoglobin                  13 5              11 5 - 15 4 *       Hematocrit                  41 2              34 8 - 46 1 %       MCV                         93                82 - 98 fL          MCH                         30 5              26 8 - 34 3 *       MCHC                        32 8              31 4 - 37 4 *       RDW                         13 5              11 6 - 15 1 %       MPV                         9 9               8 9 - 12 7 fL       Platelets                   249               149 - 390 Th*       nRBC                        0                 /100 WBCs           Neutrophils Relative        50                43 - 75 %           Immat GRANS %               0                 0 - 2 %             Lymphocytes Relative        40                14 - 44 %           Monocytes Relative          7                 4 - 12 %            Eosinophils Relative        2                 0 - 6 %             Basophils Relative          1                 0 - 1 %             Neutrophils Absolute        2 52              1 85 - 7 62 *       Immature Grans Absolute     0 02              0 00 - 0 20 *       Lymphocytes Absolute        2 05              0 60 - 4 47 *       Monocytes Absolute          0 35              0 17 - 1 22 *       Eosinophils Absolute        0 12              0 00 - 0 61 *       Basophils Absolute          0 04              0 00 - 0 10 *  -Comprehensive metabolic panel:   Collection Time: 09/20/22  7:47 AM       Result                      Value             Ref Range           Sodium                      139               135 - 147 mm*       Potassium                   4 2               3 5 - 5 3 mm*       Chloride                    108               96 - 108 mmo* CO2                         29                21 - 32 mmol*       ANION GAP                   2 (L)             4 - 13 mmol/L       BUN                         24                5 - 25 mg/dL        Creatinine                  0 79              0 60 - 1 30 *       Glucose, Fasting            92                65 - 99 mg/dL       Calcium                     9 3               8 3 - 10 1 m*       AST                         17                5 - 45 U/L          ALT                         34                12 - 78 U/L         Alkaline Phosphatase        87                46 - 116 U/L        Total Protein               7 4               6 4 - 8 4 g/*       Albumin                     3 7               3 5 - 5 0 g/*       Total Bilirubin             0 57              0 20 - 1 00 *       eGFR                        73                ml/min/1 73s*  -Lipid Panel with Direct LDL reflex:   Collection Time: 09/20/22  7:47 AM       Result                      Value             Ref Range           Cholesterol                 129               See Comment *       Triglycerides               62                See Comment *       HDL, Direct                 53                >=50 mg/dL          LDL Calculated              64                0 - 100 mg/dL  -Vitamin D 25 hydroxy:   Collection Time: 09/20/22  7:47 AM       Result                      Value             Ref Range           Vit D, 25-Hydroxy           66 9              30 0 - 100 0*      Review of Systems   Constitutional: Negative  HENT: Negative  Eyes: Negative  Respiratory: Negative  Cardiovascular: Negative  Gastrointestinal: Negative  Endocrine: Negative  Genitourinary: Negative  Musculoskeletal: Negative  Skin: Negative  Allergic/Immunologic: Negative  Neurological: Negative  Hematological: Negative  Psychiatric/Behavioral: Negative          Current Outpatient Medications on File Prior to Visit   Medication Sig    ALPRAZolam [place]) 0 25 mg tablet Take 1 tablet (0 25 mg total) by mouth daily as needed for anxiety    amLODIPine (NORVASC) 2 5 mg tablet TAKE 1 TABLET(2 5 MG) BY MOUTH DAILY    ASPIRIN 81 PO Take 81 mg by mouth in the morning    atorvastatin (LIPITOR) 20 mg tablet Take 1 tablet (20 mg total) by mouth daily    b complex vitamins capsule Take 1 capsule by mouth daily    BIOTIN PO Take by mouth    calcium carbonate (OS-BRYCE) 600 MG tablet Take 600 mg by mouth 2 (two) times a day with meals    Cholecalciferol (VITAMIN D3) 1000 units CAPS Take 2 capsules by mouth daily    Lactobacillus Rhamnosus, GG, (CULTURELLE PO) Take by mouth daily    Vitamin Mixture ([place]-C) 500-60 MG TABS Take by mouth       Objective     /60 (BP Location: Right arm, Patient Position: Sitting, Cuff Size: Standard)   Pulse 78   Temp 97 8 °F (36 6 °C) (Temporal)   Ht 5' 4" (1 626 m)   Wt 58 2 kg (128 lb 4 oz)   SpO2 98%   BMI 22 01 kg/m²     Physical Exam  Vitals and nursing note reviewed  Constitutional:       General: She is not in acute distress  Appearance: She is well-developed  She is not diaphoretic  HENT:      Head: Normocephalic and atraumatic  Eyes:      General:         Right eye: No discharge  Left eye: No discharge  Conjunctiva/sclera: Conjunctivae normal    Neck:      Vascular: No carotid bruit  Cardiovascular:      Rate and Rhythm: Normal rate and regular rhythm  Heart sounds: Murmur heard  Systolic murmur is present with a grade of 4/6  No friction rub  No gallop  Pulmonary:      Effort: Pulmonary effort is normal  No respiratory distress  Breath sounds: Normal breath sounds  No wheezing or rales  Musculoskeletal:      Cervical back: Neck supple  Skin:     General: Skin is warm and dry  Neurological:      Mental Status: She is alert and oriented to person, place, and time     Psychiatric:         Judgment: Judgment normal        Liya Lee PA-C

## 2022-10-12 PROBLEM — Z00.00 MEDICARE ANNUAL WELLNESS VISIT, SUBSEQUENT: Status: RESOLVED | Noted: 2021-03-17 | Resolved: 2022-10-12

## 2023-02-01 ENCOUNTER — OFFICE VISIT (OUTPATIENT)
Dept: CARDIOLOGY CLINIC | Facility: CLINIC | Age: 78
End: 2023-02-01

## 2023-02-01 VITALS
BODY MASS INDEX: 22.02 KG/M2 | HEIGHT: 64 IN | HEART RATE: 74 BPM | SYSTOLIC BLOOD PRESSURE: 134 MMHG | WEIGHT: 129 LBS | DIASTOLIC BLOOD PRESSURE: 60 MMHG

## 2023-02-01 DIAGNOSIS — I10 ESSENTIAL HYPERTENSION: ICD-10-CM

## 2023-02-01 DIAGNOSIS — E78.00 HYPERCHOLESTEROLEMIA: ICD-10-CM

## 2023-02-01 DIAGNOSIS — I20.9 ANGINA PECTORIS (HCC): ICD-10-CM

## 2023-02-01 DIAGNOSIS — I25.10 NONOBSTRUCTIVE ATHEROSCLEROSIS OF CORONARY ARTERY: Primary | ICD-10-CM

## 2023-02-01 DIAGNOSIS — I34.0 NONRHEUMATIC MITRAL VALVE REGURGITATION: ICD-10-CM

## 2023-02-01 NOTE — PROGRESS NOTES
[facility]  [address] [facility], [place] [address] [facility]  Phone#  [phone]   Fax#  [phone]  *-*-*-*-*-*-*-*-*-*-*-*-*-*-*-*-*-*-*-*-*-*-*-*-*-*-*-*-*-*-*-*-*-*-*-*-*-*-*-*-*-*-*-*-*-*-*-*-*-*-*-*-*-*                                   Cardiology Follow Up      ENCOUNTER DATE: 23 9:54 AM  PATIENT NAME: Shante Ribeirobinddenise   : 1945    MRN: [id number]  AGE:77 y o  SEX: female  [address] Payne MD     PRIMARY CARE PHYSICIAN: Paty Leon PA-C    ACTIVE DIAGNOSIS THIS VISIT  1  Nonobstructive atherosclerosis of coronary artery        2  Angina pectoris ([address] )        3  Hypercholesterolemia        4  Essential hypertension        5  Nonrheumatic mitral valve regurgitation  Echo complete w/ contrast if indicated        ACTIVE PROBLEM LIST  Patient Active Problem List   Diagnosis   • Generalized anxiety disorder   • Diverticulosis   • GERD without esophagitis   • Hiatal hernia   • Hypercholesterolemia   • Essential hypertension   • Irritable bowel syndrome   • Malignant melanoma of skin (HCC)   • Age-related osteoporosis without current pathological fracture   • Squamous cell carcinoma of skin   • Splenic artery aneurysm (HCC)   • Other shoulder lesions, unspecified shoulder   • Other specified disorders of rotator cuff syndrome of shoulder and allied disorders   • Healthcare maintenance   • Murmur, heart   • Hair loss   • Nonrheumatic mitral valve regurgitation   • Angina pectoris (HCC)   • Nonobstructive atherosclerosis of coronary artery       CARDIOLOGY SPECIALTY COMMENTS  Patient was 1st seen on 2021   Patient is a is a 62-year-old female who developed symptoms of chest tightness radiating into her neck and jaw with shortness of breath on physical exertion such as going up a flight of stairs   The discomfort would melba with rest  [place] primary care physician ordered a stress echocardiogram which was nondiagnostic and equivocal for myocardial ischemia  [facility] mitral regurgitation was noted on the echocardiogram   Patient states that since the stress test, her exertional chest tightness and shortness of breath has been much less severe   Patient has no family history of coronary artery disease   A sister has a murmur  Cayden Crest is a lifetime nonsmoker  06/23/2021 nuclear stress test: Resting hypertension with exaggerated hypertensive response to exercise negative treadmill stress test for angina pectoris but with abnormal ST depression with exercise  Exercise induced ventricular ectopy  Normal LV systolic function, EF 06%  Normal tomographic perfusion series  06/01/2021 echocardiogram:  Normal LV function EF 60%, mild concentric LVH  Grade 1 diastolic dysfunction  Mild-to-moderate left atrial enlargement  Moderate mitral regurgitation  02/24/2022 cardiac catheterization: Ostial circumflex lesion 50% stenosis  INTERVAL HISTORY:        Last visit, patient was having difficulty with epigastric pain and she was advised to stop her aspirin and see if it changed  She is presently taking aspirin and not having any discomfort whatsoever  She has a history of moderate mitral regurgitation  Patient denies chest discomfort or shortness of breath  Patient has no palpitations  Patient denies symptoms of dizziness, lightheadedness or near-syncope/syncope  Patient denies leg edema  Patient denies symptoms of orthopnea or paroxysmal nocturnal dyspnea  Her blood pressure at home is in the low 120s/50-60  If she takes it after she has been physically active, it might get as high as 136/68 but when she rests it immediately comes down  Her cholesterol is excellent        DISCUSSION/PLAN:          · Echocardiogram in 5 months  · Return in 6 months  · Consider changing her from amlodipine to an afterload reducing agent and possibly carvedilol if MR is increasing  · EKG on return    Lab Studies:    Lab Results   Component Value Date    CHOLESTEROL 129 09/20/2022 CHOLESTEROL 122 06/14/2022    CHOLESTEROL 117 03/11/2022     Lab Results   Component Value Date    TRIG 62 09/20/2022    TRIG 61 06/14/2022    TRIG 67 03/11/2022     Lab Results   Component Value Date    HDL 53 09/20/2022    HDL 52 06/14/2022    HDL 51 03/11/2022     Lab Results   Component Value Date    LDLCALC 64 09/20/2022    LDLCALC 58 06/14/2022    LDLCALC 53 03/11/2022     Lab Results   Component Value Date    LDLDIRECT 112 05/07/2014         Lab Results   Component Value Date    EGFR 73 09/20/2022    EGFR 80 03/11/2022    EGFR 82 02/26/2022    SODIUM 139 09/20/2022    SODIUM 138 03/11/2022    SODIUM 139 02/26/2022    K 4 2 09/20/2022    K 4 2 03/11/2022    K 3 9 02/26/2022     09/20/2022     03/11/2022     02/26/2022    CO2 29 09/20/2022    CO2 28 03/11/2022    CO2 30 02/26/2022    ANIONGAP 5 05/07/2014    BUN 24 09/20/2022    BUN 21 03/11/2022    BUN 19 02/26/2022    CREATININE 0 79 09/20/2022    CREATININE 0 73 03/11/2022    CREATININE 0 72 02/26/2022     Lab Results   Component Value Date    WBC 5 10 09/20/2022    WBC 6 52 02/11/2022    WBC 6 44 03/10/2021    HGB 13 5 09/20/2022    HGB 14 7 02/11/2022    HGB 14 1 03/10/2021    HCT 41 2 09/20/2022    HCT 45 5 02/11/2022    HCT 43 2 03/10/2021    MCV 93 09/20/2022    MCV 94 02/11/2022    MCV 92 03/10/2021    MCH 30 5 09/20/2022    MCH 30 4 02/11/2022    MCH 30 1 03/10/2021    MCHC 32 8 09/20/2022    MCHC 32 3 02/11/2022    MCHC 32 6 03/10/2021     09/20/2022     02/11/2022     03/10/2021      Lab Results   Component Value Date    GLUCOSE 98 05/07/2014    CALCIUM 9 3 09/20/2022    CALCIUM 9 1 03/11/2022    CALCIUM 8 9 02/26/2022    AST 17 09/20/2022    AST 18 03/11/2022    AST 14 03/10/2021    ALT 34 09/20/2022    ALT 33 03/11/2022    ALT 35 03/10/2021    ALKPHOS 87 09/20/2022    ALKPHOS 82 03/11/2022    ALKPHOS 87 03/10/2021    PROT 7 4 05/07/2014    BILITOT 0 37 05/07/2014     No results found for this visit on 02/01/23  Current Outpatient Medications:   •  ALPRAZolam (XANAX) 0 25 mg tablet, Take 1 tablet (0 25 mg total) by mouth daily as needed for anxiety, Disp: 30 tablet, Rfl: 0  •  amLODIPine (NORVASC) 2 5 mg tablet, TAKE 1 TABLET(2 5 MG) BY MOUTH DAILY, Disp: 90 tablet, Rfl: 3  •  ASPIRIN 81 PO, Take 81 mg by mouth in the morning, Disp: , Rfl:   •  atorvastatin (LIPITOR) 20 mg tablet, Take 1 tablet (20 mg total) by mouth daily, Disp: 90 tablet, Rfl: 3  •  b complex vitamins capsule, Take 1 capsule by mouth daily, Disp: , Rfl:   •  BIOTIN PO, Take by mouth, Disp: , Rfl:   •  calcium carbonate (OS-BRYCE) 600 MG tablet, Take 600 mg by mouth 2 (two) times a day with meals, Disp: , Rfl:   •  Cholecalciferol (VITAMIN D3) 1000 units CAPS, Take 2 capsules by mouth daily, Disp: , Rfl:   •  Lactobacillus Rhamnosus, GG, (CULTURELLE PO), Take by mouth daily, Disp: , Rfl:   •  Vitamin Mixture ([place]-C) 500-60 MG TABS, Take by mouth, Disp: , Rfl:   Allergies   Allergen Reactions   • Macrobid [Nitrofurantoin] GI Intolerance   • Penicillins Other (See Comments)     Childhood   Per pt, unsure of reaction   • Protonix [Pantoprazole] Nausea Only   • Wound Dressing Adhesive Other (See Comments)     bandaid if on for more than 24 hours       Past Medical History:   Diagnosis Date   • Anxiety    • Diverticulosis    • GERD (gastroesophageal reflux disease)    • Hiatal hernia    • Hypertension    • [facility] )    • Osteoporosis      Social History     Socioeconomic History   • Marital status: /Civil Union     Spouse name: Not on file   • Number of children: Not on file   • Years of education: Not on file   • Highest education level: Not on file   Occupational History   • Occupation: retired - [employer] [place]   Tobacco Use   • Smoking status: Never   • Smokeless tobacco: Never   • Tobacco comments:     No secondhand smoke exposure    Vaping Use   • Vaping Use: Never used   Substance and Sexual Activity   • Alcohol use: Yes     Comment: very rare socially   • Drug use: Not on file   • Sexual activity: Not on file   Other Topics Concern   • Not on file   Social History Narrative   • Not on file     Social Determinants of Health     Financial Resource Strain: Not on file   Food Insecurity: Not on file   Transportation Needs: Not on file   Physical Activity: Not on file   Stress: Not on file   Social Connections: Not on file   Intimate Partner Violence: Not on file   Housing Stability: Not on file      Family History   Problem Relation Age of Onset   • Uterine cancer Mother    • Prostate cancer Father    • Osteoporosis Sister      Past Surgical History:   Procedure Laterality Date   • CARDIAC CATHETERIZATION N/A 2/24/2022    Procedure: Cardiac catheterization;  Surgeon: Linus Cowart MD;  Location: [facility]; Service: Cardiology   • CARDIAC CATHETERIZATION N/A 2/24/2022    Procedure: Cardiac Coronary Angiogram;  Surgeon: Linus Cowart MD;  Location: [facility]; Service: Cardiology   • EGD AND COLONOSCOPY  05/14/2014    NORMAL/HP       PREVIOUS WEIGHTS:   Wt Readings from Last 10 Encounters:   02/01/23 58 5 kg (129 lb)   10/03/22 58 2 kg (128 lb 4 oz)   06/20/22 58 2 kg (128 lb 3 2 oz)   03/25/22 59 2 kg (130 lb 9 6 oz)   03/17/22 59 9 kg (132 lb)   02/24/22 58 8 kg (129 lb 10 1 oz)   02/10/22 58 7 kg (129 lb 6 4 oz)   11/11/21 59 7 kg (131 lb 9 6 oz)   09/13/21 59 9 kg (132 lb)   08/03/21 59 1 kg (130 lb 3 2 oz)        Review of Systems:  Review of Systems   Respiratory: Negative for cough, choking, chest tightness, shortness of breath and wheezing  Cardiovascular: Negative for chest pain, palpitations and leg swelling  Musculoskeletal: Negative for gait problem  Skin: Negative for rash  Neurological: Negative for dizziness, tremors, syncope, weakness, light-headedness, numbness and headaches  Psychiatric/Behavioral: Negative for agitation and behavioral problems  The patient is not hyperactive  Physical Exam:  /60 (BP Location: Right arm, Patient Position: Sitting, Cuff Size: Adult)   Pulse 74   Ht 5' 4" (1 626 m)   Wt 58 5 kg (129 lb)   BMI 22 14 kg/m²     Physical Exam  Constitutional:       General: She is not in acute distress  Appearance: She is well-developed  HENT:      Head: Normocephalic and atraumatic  Neck:      Thyroid: No thyromegaly  Vascular: No carotid bruit or JVD  Trachea: No tracheal deviation  Cardiovascular:      Rate and Rhythm: Normal rate and regular rhythm  Pulses: Normal pulses  Heart sounds: Murmur heard  No friction rub  No gallop  Comments: Grade 3/6 holosystolic murmur at the apex  Pulmonary:      Effort: Pulmonary effort is normal  No respiratory distress  Breath sounds: Normal breath sounds  No wheezing, rhonchi or rales  Chest:      Chest wall: No tenderness  Musculoskeletal:         General: Normal range of motion  Cervical back: Normal range of motion and neck supple  Right lower leg: No edema  Left lower leg: No edema  Skin:     General: Skin is warm and dry  Neurological:      General: No focal deficit present  Mental Status: She is alert and oriented to person, place, and time  Psychiatric:         Mood and Affect: Mood normal          Behavior: Behavior normal          Thought Content: Thought content normal          Judgment: Judgment normal        ======================================================  Imaging:   I have personally reviewed pertinent reports  Portions of the record may have been created with voice recognition software  Occasional wrong word or "sound a like" substitutions may have occurred due to the inherent limitations of voice recognition software  Read the chart carefully and recognize, using context, where substitutions have occurred      SIGNATURES:   Rey Sahu MD

## 2023-03-06 DIAGNOSIS — I25.10 NONOBSTRUCTIVE ATHEROSCLEROSIS OF CORONARY ARTERY: ICD-10-CM

## 2023-03-06 DIAGNOSIS — I20.9 ANGINA PECTORIS (HCC): ICD-10-CM

## 2023-03-06 RX ORDER — ATORVASTATIN CALCIUM 20 MG/1
20 TABLET, FILM COATED ORAL DAILY
Qty: 90 TABLET | Refills: 3 | Status: SHIPPED | OUTPATIENT
Start: 2023-03-06

## 2023-03-23 ENCOUNTER — APPOINTMENT (OUTPATIENT)
Dept: LAB | Facility: CLINIC | Age: 78
End: 2023-03-23

## 2023-03-23 DIAGNOSIS — I10 ESSENTIAL HYPERTENSION: ICD-10-CM

## 2023-03-23 DIAGNOSIS — E78.00 HYPERCHOLESTEROLEMIA: ICD-10-CM

## 2023-03-23 LAB
ALBUMIN SERPL BCP-MCNC: 3.8 G/DL (ref 3.5–5)
ALP SERPL-CCNC: 82 U/L (ref 46–116)
ALT SERPL W P-5'-P-CCNC: 49 U/L (ref 12–78)
ANION GAP SERPL CALCULATED.3IONS-SCNC: 2 MMOL/L (ref 4–13)
AST SERPL W P-5'-P-CCNC: 27 U/L (ref 5–45)
BILIRUB SERPL-MCNC: 0.56 MG/DL (ref 0.2–1)
BUN SERPL-MCNC: 24 MG/DL (ref 5–25)
CALCIUM SERPL-MCNC: 9.2 MG/DL (ref 8.3–10.1)
CHLORIDE SERPL-SCNC: 107 MMOL/L (ref 96–108)
CHOLEST SERPL-MCNC: 124 MG/DL
CO2 SERPL-SCNC: 28 MMOL/L (ref 21–32)
CREAT SERPL-MCNC: 0.7 MG/DL (ref 0.6–1.3)
GFR SERPL CREATININE-BSD FRML MDRD: 84 ML/MIN/1.73SQ M
GLUCOSE P FAST SERPL-MCNC: 88 MG/DL (ref 65–99)
HDLC SERPL-MCNC: 49 MG/DL
LDLC SERPL CALC-MCNC: 63 MG/DL (ref 0–100)
POTASSIUM SERPL-SCNC: 4 MMOL/L (ref 3.5–5.3)
PROT SERPL-MCNC: 7.3 G/DL (ref 6.4–8.4)
SODIUM SERPL-SCNC: 137 MMOL/L (ref 135–147)
TRIGL SERPL-MCNC: 61 MG/DL

## 2023-05-17 DIAGNOSIS — I10 HYPERTENSION, UNSPECIFIED TYPE: ICD-10-CM

## 2023-05-17 RX ORDER — AMLODIPINE BESYLATE 2.5 MG/1
TABLET ORAL
Qty: 90 TABLET | Refills: 1 | Status: SHIPPED | OUTPATIENT
Start: 2023-05-17

## 2023-07-12 ENCOUNTER — HOSPITAL ENCOUNTER (INPATIENT)
Facility: HOSPITAL | Age: 78
LOS: 2 days | Discharge: HOME | DRG: 310 | End: 2023-07-14
Attending: EMERGENCY MEDICINE | Admitting: INTERNAL MEDICINE
Payer: MEDICARE

## 2023-07-12 ENCOUNTER — APPOINTMENT (EMERGENCY)
Dept: RADIOLOGY | Facility: HOSPITAL | Age: 78
DRG: 310 | End: 2023-07-12
Payer: MEDICARE

## 2023-07-12 ENCOUNTER — HOSPITAL ENCOUNTER (OUTPATIENT)
Dept: NON INVASIVE DIAGNOSTICS | Facility: HOSPITAL | Age: 78
Discharge: HOME | End: 2023-07-12
Attending: INTERNAL MEDICINE
Payer: MEDICARE

## 2023-07-12 VITALS
SYSTOLIC BLOOD PRESSURE: 140 MMHG | WEIGHT: 130 LBS | BODY MASS INDEX: 22.2 KG/M2 | HEIGHT: 64 IN | DIASTOLIC BLOOD PRESSURE: 85 MMHG | HEART RATE: 145 BPM

## 2023-07-12 DIAGNOSIS — I34.0 NONRHEUMATIC MITRAL VALVE REGURGITATION: ICD-10-CM

## 2023-07-12 DIAGNOSIS — I48.91 NEW ONSET ATRIAL FIBRILLATION (HCC): Primary | ICD-10-CM

## 2023-07-12 DIAGNOSIS — I48.91 ATRIAL FIBRILLATION WITH RAPID VENTRICULAR RESPONSE (HCC): ICD-10-CM

## 2023-07-12 LAB
2HR DELTA HS TROPONIN: 2 NG/L
4HR DELTA HS TROPONIN: 2 NG/L
ALBUMIN SERPL BCP-MCNC: 4.5 G/DL (ref 3.5–5)
ALP SERPL-CCNC: 88 U/L (ref 34–104)
ALT SERPL W P-5'-P-CCNC: 33 U/L (ref 7–52)
ANION GAP SERPL CALCULATED.3IONS-SCNC: 8 MMOL/L
APTT PPP: 32 SECONDS (ref 23–37)
APTT PPP: 62 SECONDS (ref 23–37)
AST SERPL W P-5'-P-CCNC: 26 U/L (ref 13–39)
ATRIAL RATE: 267 BPM
ATRIAL RATE: 340 BPM
BASOPHILS # BLD AUTO: 0.05 THOUSANDS/ÂΜL (ref 0–0.1)
BASOPHILS NFR BLD AUTO: 1 % (ref 0–1)
BILIRUB SERPL-MCNC: 0.57 MG/DL (ref 0.2–1)
BNP SERPL-MCNC: 367 PG/ML (ref 0–100)
BUN SERPL-MCNC: 25 MG/DL (ref 5–25)
CALCIUM SERPL-MCNC: 9.8 MG/DL (ref 8.4–10.2)
CARDIAC TROPONIN I PNL SERPL HS: 7 NG/L
CARDIAC TROPONIN I PNL SERPL HS: 9 NG/L
CARDIAC TROPONIN I PNL SERPL HS: 9 NG/L
CHLORIDE SERPL-SCNC: 104 MMOL/L (ref 96–108)
CO2 SERPL-SCNC: 25 MMOL/L (ref 21–32)
CREAT SERPL-MCNC: 0.8 MG/DL (ref 0.6–1.3)
EOSINOPHIL # BLD AUTO: 0.13 THOUSAND/ÂΜL (ref 0–0.61)
EOSINOPHIL NFR BLD AUTO: 1 % (ref 0–6)
ERYTHROCYTE [DISTWIDTH] IN BLOOD BY AUTOMATED COUNT: 14.2 % (ref 11.6–15.1)
GFR SERPL CREATININE-BSD FRML MDRD: 71 ML/MIN/1.73SQ M
GLUCOSE SERPL-MCNC: 108 MG/DL (ref 65–140)
HCT VFR BLD AUTO: 44.2 % (ref 34.8–46.1)
HGB BLD-MCNC: 14.4 G/DL (ref 11.5–15.4)
IMM GRANULOCYTES # BLD AUTO: 0.02 THOUSAND/UL (ref 0–0.2)
IMM GRANULOCYTES NFR BLD AUTO: 0 % (ref 0–2)
INR PPP: 1.07 (ref 0.84–1.19)
LYMPHOCYTES # BLD AUTO: 3.98 THOUSANDS/ÂΜL (ref 0.6–4.47)
LYMPHOCYTES NFR BLD AUTO: 41 % (ref 14–44)
MCH RBC QN AUTO: 29.6 PG (ref 26.8–34.3)
MCHC RBC AUTO-ENTMCNC: 32.6 G/DL (ref 31.4–37.4)
MCV RBC AUTO: 91 FL (ref 82–98)
MONOCYTES # BLD AUTO: 0.71 THOUSAND/ÂΜL (ref 0.17–1.22)
MONOCYTES NFR BLD AUTO: 7 % (ref 4–12)
NEUTROPHILS # BLD AUTO: 4.73 THOUSANDS/ÂΜL (ref 1.85–7.62)
NEUTS SEG NFR BLD AUTO: 50 % (ref 43–75)
NRBC BLD AUTO-RTO: 0 /100 WBCS
P AXIS: 0 DEGREES
PLATELET # BLD AUTO: 267 THOUSANDS/UL (ref 149–390)
PMV BLD AUTO: 9.5 FL (ref 8.9–12.7)
POTASSIUM SERPL-SCNC: 4.2 MMOL/L (ref 3.5–5.3)
PROT SERPL-MCNC: 7.8 G/DL (ref 6.4–8.4)
PROTHROMBIN TIME: 13.9 SECONDS (ref 11.6–14.5)
QRS AXIS: 80 DEGREES
QRS AXIS: 82 DEGREES
QRSD INTERVAL: 80 MS
QRSD INTERVAL: 82 MS
QT INTERVAL: 278 MS
QT INTERVAL: 320 MS
QTC INTERVAL: 436 MS
QTC INTERVAL: 484 MS
RBC # BLD AUTO: 4.87 MILLION/UL (ref 3.81–5.12)
SODIUM SERPL-SCNC: 137 MMOL/L (ref 135–147)
T WAVE AXIS: -30 DEGREES
T WAVE AXIS: -35 DEGREES
TSH SERPL DL<=0.05 MIU/L-ACNC: 2.38 UIU/ML (ref 0.45–4.5)
VENTRICULAR RATE: 138 BPM
VENTRICULAR RATE: 148 BPM
WBC # BLD AUTO: 9.62 THOUSAND/UL (ref 4.31–10.16)

## 2023-07-12 PROCEDURE — 93010 ELECTROCARDIOGRAM REPORT: CPT | Performed by: INTERNAL MEDICINE

## 2023-07-12 PROCEDURE — 96376 TX/PRO/DX INJ SAME DRUG ADON: CPT

## 2023-07-12 PROCEDURE — 99285 EMERGENCY DEPT VISIT HI MDM: CPT | Performed by: EMERGENCY MEDICINE

## 2023-07-12 PROCEDURE — 36415 COLL VENOUS BLD VENIPUNCTURE: CPT | Performed by: EMERGENCY MEDICINE

## 2023-07-12 PROCEDURE — 85730 THROMBOPLASTIN TIME PARTIAL: CPT | Performed by: INTERNAL MEDICINE

## 2023-07-12 PROCEDURE — 99223 1ST HOSP IP/OBS HIGH 75: CPT | Performed by: INTERNAL MEDICINE

## 2023-07-12 PROCEDURE — 71045 X-RAY EXAM CHEST 1 VIEW: CPT

## 2023-07-12 PROCEDURE — 85730 THROMBOPLASTIN TIME PARTIAL: CPT | Performed by: EMERGENCY MEDICINE

## 2023-07-12 PROCEDURE — 93005 ELECTROCARDIOGRAM TRACING: CPT

## 2023-07-12 PROCEDURE — 85025 COMPLETE CBC W/AUTO DIFF WBC: CPT | Performed by: EMERGENCY MEDICINE

## 2023-07-12 PROCEDURE — 99285 EMERGENCY DEPT VISIT HI MDM: CPT

## 2023-07-12 PROCEDURE — 85610 PROTHROMBIN TIME: CPT | Performed by: EMERGENCY MEDICINE

## 2023-07-12 PROCEDURE — 80053 COMPREHEN METABOLIC PANEL: CPT | Performed by: EMERGENCY MEDICINE

## 2023-07-12 PROCEDURE — 84484 ASSAY OF TROPONIN QUANT: CPT | Performed by: EMERGENCY MEDICINE

## 2023-07-12 PROCEDURE — 83880 ASSAY OF NATRIURETIC PEPTIDE: CPT | Performed by: EMERGENCY MEDICINE

## 2023-07-12 PROCEDURE — 84443 ASSAY THYROID STIM HORMONE: CPT | Performed by: EMERGENCY MEDICINE

## 2023-07-12 PROCEDURE — 93306 TTE W/DOPPLER COMPLETE: CPT

## 2023-07-12 PROCEDURE — 96365 THER/PROPH/DIAG IV INF INIT: CPT

## 2023-07-12 PROCEDURE — 96366 THER/PROPH/DIAG IV INF ADDON: CPT

## 2023-07-12 RX ORDER — HEPARIN SODIUM 1000 [USP'U]/ML
3300 INJECTION, SOLUTION INTRAVENOUS; SUBCUTANEOUS ONCE
Status: COMPLETED | OUTPATIENT
Start: 2023-07-12 | End: 2023-07-12

## 2023-07-12 RX ORDER — DILTIAZEM HYDROCHLORIDE 120 MG/1
240 CAPSULE, COATED, EXTENDED RELEASE ORAL DAILY
Status: DISCONTINUED | OUTPATIENT
Start: 2023-07-13 | End: 2023-07-14 | Stop reason: HOSPADM

## 2023-07-12 RX ORDER — SACCHAROMYCES BOULARDII 250 MG
250 CAPSULE ORAL 2 TIMES DAILY
Status: CANCELLED | OUTPATIENT
Start: 2023-07-12

## 2023-07-12 RX ORDER — DILTIAZEM HYDROCHLORIDE 5 MG/ML
10 INJECTION INTRAVENOUS ONCE
Status: COMPLETED | OUTPATIENT
Start: 2023-07-12 | End: 2023-07-12

## 2023-07-12 RX ORDER — DILTIAZEM HYDROCHLORIDE 120 MG/1
240 CAPSULE, COATED, EXTENDED RELEASE ORAL DAILY
Status: DISCONTINUED | OUTPATIENT
Start: 2023-07-13 | End: 2023-07-12

## 2023-07-12 RX ORDER — DILTIAZEM HYDROCHLORIDE 60 MG/1
60 TABLET, FILM COATED ORAL EVERY 6 HOURS SCHEDULED
Status: COMPLETED | OUTPATIENT
Start: 2023-07-12 | End: 2023-07-13

## 2023-07-12 RX ORDER — SODIUM CHLORIDE 9 MG/ML
75 INJECTION, SOLUTION INTRAVENOUS CONTINUOUS
Status: CANCELLED | OUTPATIENT
Start: 2023-07-13

## 2023-07-12 RX ORDER — ALPRAZOLAM 0.25 MG/1
0.25 TABLET ORAL DAILY PRN
Status: CANCELLED | OUTPATIENT
Start: 2023-07-12

## 2023-07-12 RX ORDER — ACETAMINOPHEN 325 MG/1
650 TABLET ORAL EVERY 6 HOURS PRN
Status: CANCELLED | OUTPATIENT
Start: 2023-07-12

## 2023-07-12 RX ORDER — ATORVASTATIN CALCIUM 20 MG/1
20 TABLET, FILM COATED ORAL
Status: CANCELLED | OUTPATIENT
Start: 2023-07-12

## 2023-07-12 RX ORDER — CALCIUM CARBONATE 500 MG/1
1000 TABLET, CHEWABLE ORAL DAILY PRN
Status: CANCELLED | OUTPATIENT
Start: 2023-07-12

## 2023-07-12 RX ORDER — HEPARIN SODIUM 10000 [USP'U]/100ML
3-20 INJECTION, SOLUTION INTRAVENOUS
Status: DISPENSED | OUTPATIENT
Start: 2023-07-12 | End: 2023-07-13

## 2023-07-12 RX ORDER — METOPROLOL TARTRATE 5 MG/5ML
5 INJECTION INTRAVENOUS ONCE
Status: COMPLETED | OUTPATIENT
Start: 2023-07-12 | End: 2023-07-12

## 2023-07-12 RX ORDER — HEPARIN SODIUM 1000 [USP'U]/ML
3300 INJECTION, SOLUTION INTRAVENOUS; SUBCUTANEOUS EVERY 6 HOURS PRN
Status: DISCONTINUED | OUTPATIENT
Start: 2023-07-12 | End: 2023-07-13

## 2023-07-12 RX ORDER — ONDANSETRON 2 MG/ML
4 INJECTION INTRAMUSCULAR; INTRAVENOUS EVERY 6 HOURS PRN
Status: CANCELLED | OUTPATIENT
Start: 2023-07-12

## 2023-07-12 RX ORDER — HEPARIN SODIUM 1000 [USP'U]/ML
1650 INJECTION, SOLUTION INTRAVENOUS; SUBCUTANEOUS EVERY 6 HOURS PRN
Status: DISCONTINUED | OUTPATIENT
Start: 2023-07-12 | End: 2023-07-13

## 2023-07-12 RX ADMIN — DILTIAZEM HYDROCHLORIDE 60 MG: 60 TABLET, FILM COATED ORAL at 20:15

## 2023-07-12 RX ADMIN — METOPROLOL TARTRATE 25 MG: 25 TABLET, FILM COATED ORAL at 19:00

## 2023-07-12 RX ADMIN — DILTIAZEM HYDROCHLORIDE 5 MG/HR: 5 INJECTION INTRAVENOUS at 12:12

## 2023-07-12 RX ADMIN — DILTIAZEM HYDROCHLORIDE 60 MG: 60 TABLET, FILM COATED ORAL at 16:44

## 2023-07-12 RX ADMIN — HEPARIN SODIUM 12 UNITS/KG/HR: 10000 INJECTION, SOLUTION INTRAVENOUS at 14:49

## 2023-07-12 RX ADMIN — METOPROLOL TARTRATE 25 MG: 25 TABLET, FILM COATED ORAL at 12:59

## 2023-07-12 RX ADMIN — DILTIAZEM HYDROCHLORIDE 10 MG: 5 INJECTION INTRAVENOUS at 12:00

## 2023-07-12 RX ADMIN — HEPARIN SODIUM 3300 UNITS: 1000 INJECTION INTRAVENOUS; SUBCUTANEOUS at 14:45

## 2023-07-12 RX ADMIN — METOPROLOL TARTRATE 5 MG: 1 INJECTION, SOLUTION INTRAVENOUS at 14:27

## 2023-07-12 NOTE — ED PROVIDER NOTES
History  Chief Complaint   Patient presents with   • Atrial Fibrillation     Pt was at cardiologist offices and they noted her being on Afib, pt reports no complaints at this time, just my heart is raising for the past 2 months. 71 y/o female was getting an outpt. Echo done today as an outpt. And they noticed her to be in new onset a.fib. Her heart rate upon arrival to ER was up to 140's-150's. She's felt palpitations on and off for the past 5 days. No cp, no sob. No caffeine intake. Dr. Shaquille Cabral, cardiologist, called that she was coming to the ER and recommended admission , anticoagulation and cardiology consult. Cardiologist is at bedside when pt. Arrived to the ER. Prior to Admission Medications   Prescriptions Last Dose Informant Patient Reported? Taking?    ALPRAZolam (XANAX) 0.25 mg tablet  Self No No   Sig: Take 1 tablet (0.25 mg total) by mouth daily as needed for anxiety   ASPIRIN 81 PO  Self Yes No   Sig: Take 81 mg by mouth in the morning   BIOTIN PO  Self Yes No   Sig: Take by mouth   Cholecalciferol (VITAMIN D3) 1000 units CAPS  Self Yes No   Sig: Take 2 capsules by mouth daily   Flowflex COVID-19 Ag Home Test KIT  Self Yes No   Sig: Use as directed   Lactobacillus Rhamnosus, GG, (CULTURELLE PO)  Self Yes No   Sig: Take by mouth daily   Vitamin Mixture ([place]-C) 500-60 MG TABS  Self Yes No   Sig: Take by mouth   amLODIPine (NORVASC) 2.5 mg tablet   No Yes   Sig: TAKE 1 TABLET(2.5 MG) BY MOUTH DAILY   atorvastatin (LIPITOR) 20 mg tablet  Self No No   Sig: Take 1 tablet (20 mg total) by mouth daily   b complex vitamins capsule  Self Yes No   Sig: Take 1 capsule by mouth daily   calcium carbonate (OS-BRYCE) 600 MG tablet  Self Yes No   Sig: Take 600 mg by mouth 2 (two) times a day with meals      Facility-Administered Medications: None       Past Medical History:   Diagnosis Date   • Anxiety    • Diverticulosis    • GERD (gastroesophageal reflux disease)    • Hiatal hernia    • Hypertension • Melanoma [facility])    • Osteoporosis        Past Surgical History:   Procedure Laterality Date   • CARDIAC CATHETERIZATION N/A 2/24/2022    Procedure: Cardiac catheterization;  Surgeon: John Puckett MD;  Location: [facility] CARD[facility]; Service: Cardiology   • CARDIAC CATHETERIZATION N/A 2/24/2022    Procedure: Cardiac Coronary Angiogram;  Surgeon: John Puckett MD;  Location: [facility]; Service: Cardiology   • EGD AND COLONOSCOPY  05/14/2014    NORMAL/HP       Family History   Problem Relation Age of Onset   • Uterine cancer Mother    • Prostate cancer Father    • Osteoporosis Sister      I have reviewed and agree with the history as documented. E-Cigarette/Vaping   • E-Cigarette Use Never User      E-Cigarette/Vaping Substances     Social History     Tobacco Use   • Smoking status: Never   • Smokeless tobacco: Never   • Tobacco comments:     No secondhand smoke exposure    Vaping Use   • Vaping Use: Never used   Substance Use Topics   • Alcohol use: Yes     Comment: very rare socially       Review of Systems   Constitutional: Negative for appetite change, fatigue and fever. HENT: Negative for rhinorrhea and sore throat. Eyes: Negative for pain. Respiratory: Negative for cough, shortness of breath and wheezing. Cardiovascular: Positive for palpitations. Negative for chest pain and leg swelling. Gastrointestinal: Negative for abdominal pain, diarrhea and vomiting. Genitourinary: Negative for dysuria and flank pain. Musculoskeletal: Negative for back pain and neck pain. Skin: Negative for rash. Neurological: Negative for syncope and headaches. Psychiatric/Behavioral:        Mood normal       Physical Exam  Physical Exam  Vitals and nursing note reviewed. Constitutional:       Appearance: She is well-developed. HENT:      Head: Normocephalic and atraumatic. Right Ear: External ear normal.      Left Ear: External ear normal.   Eyes:      General: No scleral icterus. Extraocular Movements: Extraocular movements intact. Cardiovascular:      Rate and Rhythm: Tachycardia present. Rhythm irregular. Pulmonary:      Effort: Pulmonary effort is normal. No respiratory distress. Breath sounds: Normal breath sounds. Abdominal:      Palpations: Abdomen is soft. Tenderness: There is no abdominal tenderness. Musculoskeletal:         General: No deformity or signs of injury. Normal range of motion. Cervical back: Normal range of motion and neck supple. Skin:     General: Skin is warm and dry. Coloration: Skin is not jaundiced or pale. Neurological:      General: No focal deficit present. Mental Status: She is alert and oriented to person, place, and time.    Psychiatric:         Mood and Affect: Mood normal.         Behavior: Behavior normal.         Vital Signs  ED Triage Vitals [07/12/23 1146]   Temperature Pulse Respirations Blood Pressure SpO2   98.6 °F (37 °C) (!) 150 20 137/98 100 %      Temp Source Heart Rate Source Patient Position - Orthostatic VS BP Location FiO2 (%)   Oral Monitor Lying Right arm --      Pain Score       No Pain           Vitals:    07/12/23 1300 07/12/23 1315 07/12/23 1330 07/12/23 1555   BP: 168/74 155/87 143/76 117/53   Pulse: (!) 112 (!) 112 (!) 108 66   Patient Position - Orthostatic VS:  Lying Lying Lying         Visual Acuity      ED Medications  Medications   metoprolol tartrate (LOPRESSOR) tablet 25 mg (25 mg Oral Given 7/12/23 1259)   heparin (porcine) 25,000 units in 0.45% NaCl 250 mL infusion (premix) ( Intravenous Not Given 7/12/23 1620)   heparin (porcine) injection 3,300 Units (has no administration in time range)   heparin (porcine) injection 1,650 Units (has no administration in time range)   diltiazem (CARDIZEM) tablet 60 mg (60 mg Oral Given 7/12/23 1644)   diltiazem (CARDIZEM CD) 24 hr capsule 240 mg (has no administration in time range)   diltiazem (CARDIZEM) injection 10 mg (10 mg Intravenous Given 7/12/23 1200)   diltiazem (CARDIZEM) 125 mg in sodium chloride 0.9 % 125 mL infusion (0 mg/hr Intravenous Stopped 7/12/23 1638)   metoprolol (LOPRESSOR) injection 5 mg (5 mg Intravenous Given 7/12/23 1427)   heparin (porcine) injection 3,300 Units (3,300 Units Intravenous Given 7/12/23 1445)       Diagnostic Studies  Results Reviewed     Procedure Component Value Units Date/Time    HS Troponin I 4hr [[id number]]  (Normal) Collected: 07/12/23 1642    Lab Status: Final result Specimen: Blood from Arm, Right Updated: 07/12/23 1712     hs TnI 4hr 9 ng/L      Delta 4hr hsTnI 2 ng/L     HS Troponin I 2hr [[id number]]  (Normal) Collected: 07/12/23 1401    Lab Status: Final result Specimen: Blood from Arm, Right Updated: 07/12/23 1436     hs TnI 2hr 9 ng/L      Delta 2hr hsTnI 2 ng/L     APTT six (6) hours after Heparin bolus/drip initiation or dosing change [[id number]]     Lab Status: No result Specimen: Blood     TSH [[id number]]  (Normal) Collected: 07/12/23 1153    Lab Status: Final result Specimen: Blood from Arm, Left Updated: 07/12/23 1233     TSH 3RD GENERATON 2.379 uIU/mL     HS Troponin 0hr (reflex protocol) [[id number]]  (Normal) Collected: 07/12/23 1153    Lab Status: Final result Specimen: Blood from Arm, Left Updated: 07/12/23 1226     hs TnI 0hr 7 ng/L     B-Type Natriuretic Peptide(BNP) [[id number]]  (Abnormal) Collected: 07/12/23 1153    Lab Status: Final result Specimen: Blood from Arm, Left Updated: 07/12/23 1224      pg/mL     Comprehensive metabolic panel [[id number]] Collected: 07/12/23 1153    Lab Status: Final result Specimen: Blood from Arm, Left Updated: 07/12/23 1217     Sodium 137 mmol/L      Potassium 4.2 mmol/L      Chloride 104 mmol/L      CO2 25 mmol/L      ANION GAP 8 mmol/L      BUN 25 mg/dL      Creatinine 0.80 mg/dL      Glucose 108 mg/dL      Calcium 9.8 mg/dL      AST 26 U/L      ALT 33 U/L      Alkaline Phosphatase 88 U/L      Total Protein 7.8 g/dL      Albumin 4.5 g/dL      Total Bilirubin 0.57 mg/dL      eGFR 71 ml/min/1.73sq m     Narrative:      [facility] guidelines for Chronic Kidney Disease (CKD):   •  Stage 1 with normal or high GFR (GFR > 90 mL/min/1.73 square meters)  •  Stage 2 Mild CKD (GFR = 60-89 mL/min/1.73 square meters)  •  Stage 3A Moderate CKD (GFR = 45-59 mL/min/1.73 square meters)  •  Stage 3B Moderate CKD (GFR = 30-44 mL/min/1.73 square meters)  •  Stage 4 Severe CKD (GFR = 15-29 mL/min/1.73 square meters)  •  Stage 5 End Stage CKD (GFR <15 mL/min/1.73 square meters)  Note: GFR calculation is accurate only with a steady state creatinine    Protime-INR [[id number]]  (Normal) Collected: 07/12/23 1153    Lab Status: Final result Specimen: Blood from Arm, Left Updated: 07/12/23 1214     Protime 13.9 seconds      INR 1.07    APTT [[id number]]  (Normal) Collected: 07/12/23 1153    Lab Status: Final result Specimen: Blood from Arm, Left Updated: 07/12/23 1214     PTT 32 seconds     CBC and differential [[id number]] Collected: 07/12/23 1153    Lab Status: Final result Specimen: Blood from Arm, Left Updated: 07/12/23 1158     WBC 9.62 Thousand/uL      RBC 4.87 Million/uL      Hemoglobin 14.4 g/dL      Hematocrit 44.2 %      MCV 91 fL      MCH 29.6 pg      MCHC 32.6 g/dL      RDW 14.2 %      MPV 9.5 fL      Platelets 158 Thousands/uL      nRBC 0 /100 WBCs      Neutrophils Relative 50 %      Immat GRANS % 0 %      Lymphocytes Relative 41 %      Monocytes Relative 7 %      Eosinophils Relative 1 %      Basophils Relative 1 %      Neutrophils Absolute 4.73 Thousands/µL      Immature Grans Absolute 0.02 Thousand/uL      Lymphocytes Absolute 3.98 Thousands/µL      Monocytes Absolute 0.71 Thousand/µL      Eosinophils Absolute 0.13 Thousand/µL      Basophils Absolute 0.05 Thousands/µL                  XR chest 1 view portable   Final Result by Carlos Dumont MD (07/12 8258)      No acute cardiopulmonary disease.                   Workstation performed: [id number] Procedures  ECG 12 Lead Documentation Only    Date/Time: 7/12/2023 11:45 AM    Performed by: Lazarus Barer, MD  Authorized by: Lazarus Barer, MD    Rate:     ECG rate:  148    ECG rate assessment: tachycardic    Rhythm:     Rhythm: atrial fibrillation    ST segments:     ST segments:  Non-specific             ED Course       [id number] SCORE    Flowsheet Row Most Recent Value   [id number]    Age 2 Filed at: 07/12/2023 1613   Sex 1 Filed at: 07/12/2023 1613   CHF History --   HTN History --   Stroke or TIA Symptoms Previously --   Vascular Disease History 1 Filed at: 07/12/2023 1613   Diabetes History --   [id number] Score 4 Filed at: 07/12/2023 1613                              SBIRT 22yo+    Flowsheet Row Most Recent Value   Initial Alcohol Screen: US AUDIT-C     1. How often do you have a drink containing alcohol? 0 Filed at: 07/12/2023 1205   2. How many drinks containing alcohol do you have on a typical day you are drinking? 0 Filed at: 07/12/2023 1205   3a. Male UNDER 65: How often do you have five or more drinks on one occasion? 0 Filed at: 07/12/2023 1205   3b. FEMALE Any Age, or MALE 65+: How often do you have 4 or more drinks on one occassion? 0 Filed at: 07/12/2023 1205   Audit-C Score 0 Filed at: 07/12/2023 1205   SERGE: How many times in the past year have you. .. Used an illegal drug or used a prescription medication for non-medical reasons? Never Filed at: 07/12/2023 1205                    Medical Decision Making  New onset a.fib, improved with cardizem bolus and drip. Cardiology saw pt. - admitted for further workup/managment    Atrial fibrillation with rapid ventricular response [facility]): acute illness or injury  Amount and/or Complexity of Data Reviewed  Labs: ordered. Radiology: ordered. Risk  Prescription drug management. Decision regarding hospitalization.           Disposition  Final diagnoses:   New onset atrial fibrillation [facility])   Atrial fibrillation with rapid ventricular response ([address])     Time reflects when diagnosis was documented in both MDM as applicable and the Disposition within this note     Time User Action Codes Description Comment    7/12/2023 12:15 PM Lawson Kothari Add [I48.91] New onset atrial fibrillation ([address])     7/12/2023  2:04 PM Lawson Kothari Add [I48.91] Atrial fibrillation with rapid ventricular response [facility])       ED Disposition     ED Disposition   Admit    Condition   Stable    Date/Time   Wed Jul 12, 2023  2:04 PM    Comment   Case was discussed with SLIM and cardiology and the patient's admission status was agreed to be inpt/tele           Follow-up Information    None         Current Discharge Medication List      CONTINUE these medications which have NOT CHANGED    Details   amLODIPine (NORVASC) 2.5 mg tablet TAKE 1 TABLET(2.5 MG) BY MOUTH DAILY  Qty: 90 tablet, Refills: 1    Associated Diagnoses: Hypertension, unspecified type      ALPRAZolam (XANAX) 0.25 mg tablet Take 1 tablet (0.25 mg total) by mouth daily as needed for anxiety  Qty: 30 tablet, Refills: 0    Associated Diagnoses: Generalized anxiety disorder      ASPIRIN 81 PO Take 81 mg by mouth in the morning      atorvastatin (LIPITOR) 20 mg tablet Take 1 tablet (20 mg total) by mouth daily  Qty: 90 tablet, Refills: 3    Comments: **Patient requests 90 days supply**  Associated Diagnoses: Nonobstructive atherosclerosis of coronary artery;  Angina pectoris (HCC)      b complex vitamins capsule Take 1 capsule by mouth daily      BIOTIN PO Take by mouth      calcium carbonate (OS-BRYCE) 600 MG tablet Take 600 mg by mouth 2 (two) times a day with meals      Cholecalciferol (VITAMIN D3) 1000 units CAPS Take 2 capsules by mouth daily      Flowflex COVID-19 Ag Home Test KIT Use as directed      Lactobacillus Rhamnosus, GG, (CULTURELLE PO) Take by mouth daily      Vitamin Mixture ([place]na-C) 500-60 MG TABS Take by mouth             No discharge procedures on file.     PDMP Review       Value Time User    PDMP Reviewed  Yes 3/25/2022 11:30 AM Thelma Cannon PA-C          ED Provider  Electronically Signed by           Irine Spurling, MD  07/12/23 9490

## 2023-07-12 NOTE — ASSESSMENT & PLAN NOTE
· Patient was undergoing routine echocardiogram today when she was found to be in A-fib with RVR  · Does note intermittent palpitations over the past month  · Sent to the ED for evaluation and admission  · Seen in consult by cardiology  · Started on Cardizem drip, continue  · Start metoprolol every 6 hours  · Heparin drip initiated in the ED, we will price check Eliquis  · Monitor on telemetry  · N.p.o. after midnight

## 2023-07-12 NOTE — PLAN OF CARE
Problem: Potential for Falls  Goal: Patient will remain free of falls  Description: INTERVENTIONS:  - Educate patient/family on patient safety including physical limitations  - Instruct patient to call for assistance with activity   - Consult OT/PT to assist with strengthening/mobility   - Keep Call bell within reach  - Keep bed low and locked with side rails adjusted as appropriate  - Keep care items and personal belongings within reach  - Initiate and maintain comfort rounds  - Make Fall Risk Sign visible to staff  - Offer Toileting every Hours, in advance of need  - Initiate/Maintain alarm  - Obtain necessary fall risk management equipment:   - Apply yellow socks and bracelet for high fall risk patients  - Consider moving patient to room near nurses station  Outcome: Progressing     Problem: PAIN - ADULT  Goal: Verbalizes/displays adequate comfort level or baseline comfort level  Description: Interventions:  - Encourage patient to monitor pain and request assistance  - Assess pain using appropriate pain scale  - Administer analgesics based on type and severity of pain and evaluate response  - Implement non-pharmacological measures as appropriate and evaluate response  - Consider cultural and social influences on pain and pain management  - Notify physician/advanced practitioner if interventions unsuccessful or patient reports new pain  Outcome: Progressing     Problem: SAFETY ADULT  Goal: Patient will remain free of falls  Description: INTERVENTIONS:  - Educate patient/family on patient safety including physical limitations  - Instruct patient to call for assistance with activity   - Consult OT/PT to assist with strengthening/mobility   - Keep Call bell within reach  - Keep bed low and locked with side rails adjusted as appropriate  - Keep care items and personal belongings within reach  - Initiate and maintain comfort rounds  - Make Fall Risk Sign visible to staff  - Offer Toileting every  Hours, in advance of need  - Initiate/Maintain alarm  - Obtain necessary fall risk management equipment:   - Apply yellow socks and bracelet for high fall risk patients  - Consider moving patient to room near nurses station  Outcome: Progressing     Problem: DISCHARGE PLANNING  Goal: Discharge to home or other facility with appropriate resources  Description: INTERVENTIONS:  - Identify barriers to discharge w/patient and caregiver  - Arrange for needed discharge resources and transportation as appropriate  - Identify discharge learning needs (meds, wound care, etc.)  - Arrange for interpretive services to assist at discharge as needed  - Refer to Case Management Department for coordinating discharge planning if the patient needs post-hospital services based on physician/advanced practitioner order or complex needs related to functional status, cognitive ability, or social support system  Outcome: Progressing     Problem: Knowledge Deficit  Goal: Patient/family/caregiver demonstrates understanding of disease process, treatment plan, medications, and discharge instructions  Description: Complete learning assessment and assess knowledge base.   Interventions:  - Provide teaching at level of understanding  - Provide teaching via preferred learning methods  Outcome: Progressing     Problem: CARDIOVASCULAR - ADULT  Goal: Maintains optimal cardiac output and hemodynamic stability  Description: INTERVENTIONS:  - Monitor I/O, vital signs and rhythm  - Monitor for S/S and trends of decreased cardiac output  - Administer and titrate ordered vasoactive medications to optimize hemodynamic stability  - Assess quality of pulses, skin color and temperature  - Assess for signs of decreased coronary artery perfusion  - Instruct patient to report change in severity of symptoms  Outcome: Progressing  Goal: Absence of cardiac dysrhythmias or at baseline rhythm  Description: INTERVENTIONS:  - Continuous cardiac monitoring, vital signs, obtain 12 lead EKG if ordered  - Administer antiarrhythmic and heart rate control medications as ordered  - Monitor electrolytes and administer replacement therapy as ordered  Outcome: Progressing

## 2023-07-12 NOTE — ED NOTES
Provider aware cardizem drip at 15mg/hr last heart rate 112.       Cicero Gosselin, RN  07/12/23 4752

## 2023-07-12 NOTE — H&P
[facility]  H&P  Name: Charisse Mccoy 70 y.o. female I MRN: [id number]  Unit/Bed#: ED-42 I Date of Admission: 7/12/2023   Date of Service: 7/12/2023 I Hospital Day: 0      Assessment/Plan   * Atrial fibrillation with rapid ventricular response [facility])  Assessment & Plan  · Patient was undergoing routine echocardiogram today when she was found to be in A-fib with RVR  · Does note intermittent palpitations over the past month  · Sent to the ED for evaluation and admission  · Seen in consult by cardiology  · Started on Cardizem drip, continue  · Start metoprolol every 6 hours  · Heparin drip initiated in the ED, we will price check Eliquis  · Monitor on telemetry  · N.p.o. after midnight    Nonobstructive atherosclerosis of coronary artery  Assessment & Plan  · Patient with cardiac catheterization in 2022 with less than 50% stenosis  · Denies chest pain at time of admission  · Troponin 7  · Hold aspirin in favor of anticoagulation    Nonrheumatic mitral valve regurgitation  Assessment & Plan  · Patient was receiving routine echocardiogram for known mitral valve regurg when she was found to be in atrial fibrillation  · No evidence of volume overload    Essential hypertension  Assessment & Plan  · BP well controlled at time of admission  · Maintain the patient on Norvasc 2.5 mg daily, hold in favor of metoprolol  · Start metoprolol 25 mg every 6 hours    Generalized anxiety disorder  Assessment & Plan  · Continue as needed Xanax       VTE Pharmacologic Prophylaxis: VTE Score: 3 High Risk (Score >/= 5) - Pharmacological DVT Prophylaxis Ordered: heparin drip. Sequential Compression Devices Ordered. Code Status: Prior full code  Discussion with family: Updated  () at bedside.     Anticipated Length of Stay: Patient will be admitted on an inpatient basis with an anticipated length of stay of greater than 2 midnights secondary to Atrial fibrillation with rapid ventricular response. Total Time Spent on Date of Encounter in care of patient: 65 minutes This time was spent on one or more of the following: performing physical exam; counseling and coordination of care; obtaining or reviewing history; documenting in the medical record; reviewing/ordering tests, medications or procedures; communicating with other healthcare professionals and discussing with patient's family/caregivers. Chief Complaint: A-fib    History of Present Illness:  Cally Dutton is a 70 y.o. female with a PMH of mitral regurgitation, anxiety, hypertension, nonobstructive CAD who presents with A-fib. Patient was at the hospital for an routine echocardiogram and was found to be in atrial fibrillation with rapid ventricular response. She does report she has been having intermittent palpitations over the past month but typically resolve spontaneously. She does report she was feeling palpitations when she entered the hospital today. Denies any prior history of atrial fibrillation. Does report history of mitral regurgitation which she developed approximately a year and a half ago. She denies any pain or discomfort at time of admission. Denies any shortness of breath. Denies any recent acute illnesses. Review of Systems:  Review of Systems   Constitutional: Negative for chills and fever. HENT: Negative for trouble swallowing. Eyes: Negative for visual disturbance. Respiratory: Negative for cough and shortness of breath. Cardiovascular: Positive for palpitations. Negative for chest pain. Gastrointestinal: Negative for abdominal pain, nausea and vomiting. Genitourinary: Negative for difficulty urinating. Musculoskeletal: Negative for back pain and gait problem. Skin: Negative for rash. Neurological: Negative for dizziness and weakness. Psychiatric/Behavioral: Negative for confusion.        Past Medical and Surgical History:   Past Medical History:   Diagnosis Date   • Anxiety    • Diverticulosis    • GERD (gastroesophageal reflux disease)    • Hiatal hernia    • Hypertension    • Melanoma [facility])    • Osteoporosis        Past Surgical History:   Procedure Laterality Date   • CARDIAC CATHETERIZATION N/A 2/24/2022    Procedure: Cardiac catheterization;  Surgeon: Avel Ocampo MD;  Location: [facility] CARDIAC CATH LAB; Service: Cardiology   • CARDIAC CATHETERIZATION N/A 2/24/2022    Procedure: Cardiac Coronary Angiogram;  Surgeon: Avle Ocampo MD;  Location: [facility] CARDIAC CATH LAB; Service: Cardiology   • EGD AND COLONOSCOPY  05/14/2014    NORMAL/HP       Meds/Allergies:  Prior to Admission medications    Medication Sig Start Date End Date Taking? Authorizing Provider   ALPRAZolam Elmon Gavia) 0.25 mg tablet Take 1 tablet (0.25 mg total) by mouth daily as needed for anxiety 3/25/22   Fabian Franco PA-C   amLODIPine (NORVASC) 2.5 mg tablet TAKE 1 TABLET(2.5 MG) BY MOUTH DAILY 5/17/23   Fabian Franco PA-C   ASPIRIN 81 PO Take 81 mg by mouth in the morning    Historical Provider, MD   atorvastatin (LIPITOR) 20 mg tablet Take 1 tablet (20 mg total) by mouth daily 3/6/23   Shalini Bower MD   b complex vitamins capsule Take 1 capsule by mouth daily    Historical Provider, MD   BIOTIN PO Take by mouth    Historical Provider, MD   calcium carbonate (OS-BRYCE) 600 MG tablet Take 600 mg by mouth 2 (two) times a day with meals    Historical Provider, MD   Cholecalciferol (VITAMIN D3) 1000 units CAPS Take 2 capsules by mouth daily 3/12/14   Historical Provider, MD   Flowflex COVID-19 Ag Home Test KIT Use as directed 3/3/23   Historical Provider, MD   Lactobacillus Rhamnosus, GG, (CULTURELLE PO) Take by mouth daily    Historical Provider, MD   Vitamin Mixture ([place]-C) 500-60 MG TABS Take by mouth    Historical Provider, MD     I have reviewed home medications with patient personally. Allergies:    Allergies   Allergen Reactions   • Macrobid [Nitrofurantoin] GI Intolerance   • Penicillins Other (See Comments) Childhood. Per pt, unsure of reaction   • Protonix [Pantoprazole] Nausea Only   • Wound Dressing Adhesive Other (See Comments)     bandaid if on for more than 24 hours       Social History:  Marital Status: /Civil Union   Occupation: Unknown  Patient Pre-hospital Living Situation: Home  Patient Pre-hospital Level of Mobility: walks  Patient Pre-hospital Diet Restrictions: None  Substance Use History:   Social History     Substance and Sexual Activity   Alcohol Use Yes    Comment: very rare socially     Social History     Tobacco Use   Smoking Status Never   Smokeless Tobacco Never   Tobacco Comments    No secondhand smoke exposure      Social History     Substance and Sexual Activity   Drug Use Not on file       Family History:  Family History   Problem Relation Age of Onset   • Uterine cancer Mother    • Prostate cancer Father    • Osteoporosis Sister        Physical Exam:     Vitals:   Blood Pressure: 143/76 (07/12/23 1330)  Pulse: (!) 108 (07/12/23 1330)  Temperature: 98.6 °F (37 °C) (07/12/23 1146)  Temp Source: Oral (07/12/23 1146)  Respirations: 21 (07/12/23 1330)  SpO2: 100 % (07/12/23 1330)    Physical Exam  Vitals reviewed. Constitutional:       General: She is not in acute distress. HENT:      Head: Normocephalic and atraumatic. Eyes:      General: No scleral icterus. Conjunctiva/sclera: Conjunctivae normal.   Cardiovascular:      Rate and Rhythm: Tachycardia present. Rhythm irregular. Heart sounds: No murmur heard. Pulmonary:      Effort: Pulmonary effort is normal. No respiratory distress. Breath sounds: Normal breath sounds. Abdominal:      General: Bowel sounds are normal. There is no distension. Palpations: Abdomen is soft. Tenderness: There is no abdominal tenderness. Musculoskeletal:      Cervical back: Neck supple. Right lower leg: No edema. Left lower leg: No edema. Skin:     General: Skin is warm and dry.    Neurological:      Mental Status: She is alert and oriented to person, place, and time. Psychiatric:         Mood and Affect: Mood normal.         Behavior: Behavior normal.          Additional Data:     Lab Results:  Results from last 7 days   Lab Units 07/12/23  1153   WBC Thousand/uL 9.62   HEMOGLOBIN g/dL 14.4   HEMATOCRIT % 44.2   PLATELETS Thousands/uL 267   NEUTROS PCT % 50   LYMPHS PCT % 41   MONOS PCT % 7   EOS PCT % 1     Results from last 7 days   Lab Units 07/12/23  1153   SODIUM mmol/L 137   POTASSIUM mmol/L 4.2   CHLORIDE mmol/L 104   CO2 mmol/L 25   BUN mg/dL 25   CREATININE mg/dL 0.80   ANION GAP mmol/L 8   CALCIUM mg/dL 9.8   ALBUMIN g/dL 4.5   TOTAL BILIRUBIN mg/dL 0.57   ALK PHOS U/L 88   ALT U/L 33   AST U/L 26   GLUCOSE RANDOM mg/dL 108     Results from last 7 days   Lab Units 07/12/23  1153   INR  1.07                   Lines/Drains:  Invasive Devices     Peripheral Intravenous Line  Duration           Peripheral IV 07/12/23 Left;Proximal;Ventral (anterior) Antecubital <1 day    Peripheral IV 07/12/23 Right Antecubital <1 day                    Imaging: Reviewed radiology reports from this admission including: chest xray  XR chest 1 view portable   Final Result by Sahara Fierro MD (07/12 7818)      No acute cardiopulmonary disease. Workstation performed: [id number]             EKG and Other Studies Reviewed on Admission:   · EKG: Atrial fibrillation. . ** Please Note: This note has been constructed using a voice recognition system.  **

## 2023-07-12 NOTE — ASSESSMENT & PLAN NOTE
· Patient with cardiac catheterization in 2022 with less than 50% stenosis  · Denies chest pain at time of admission  · Troponin 7  · Hold aspirin in favor of anticoagulation

## 2023-07-12 NOTE — ASSESSMENT & PLAN NOTE
· Patient was receiving routine echocardiogram for known mitral valve regurg when she was found to be in atrial fibrillation  · No evidence of volume overload

## 2023-07-12 NOTE — CONSULTS
Consultation - Cardiology   [facility] 70 y.o. female MRN: [id number]  Unit/Bed#: ED-42 Encounter: [id number]    Inpatient consult to Cardiology  Consult performed by: Jeniffer Santos MD  Consult ordered by: Jocelyn Eugene MD          History of Present Illness   Physician Requesting Consult: Jocelyn Eugene*  Reason for Consult / Principal Problem: New onset atrial fibrillation      Assessment:      Assessment/ Plan:    Atrial fibrillation with RVR-new onset  Symptom onset about a month ago  Currently in atrial fibrillation with rates of 120-140  Hemodynamically stable  Unclear trigger: Has moderate MR with dilated left atrium, TSH 2.39, BMI 22.31, no history of snoring, no excessive alcohol intake, electrolytes within normal limits  CHADVASC: 4  Current Meds: IV diltiazem gtt. AC: will start IV heparin gtt    Nonobstructive coronary artery disease  History of exertional angina  Left heart cath 2/2022: 50% stenosis of ostial circumflex lesion  Currently with no active or exertional angina    Moderate MR  Echo in June 2021 shows moderate MR, moderately dilated left atrium, EF 60%  Patient has been has been asymptomatic with no CHF    Hypertension  Home meds: Amlodipine 2.5 mg daily    Hyperlipidemia  On atorvastatin 20 mg daily      Plan  1. Continue the diltiazem drip  2. Will start metoprolol tartrate 25 mg every 6 hours with hold parameters  3. Start IV heparin GTT for anticoagulation, will need to transition to p.o. NOAC prior to discharge  4. DEANGELO cardioversion tomorrow, keep NPO from midnight  5. Will initiate amiodarone post DEANGELO cardioversion  6. Hold home amlodipine for now to allow dosing of metoprolol tartrate  7. Echocardiogram done outpatient, will review images when available  8. Would hold off on Mitral valve interventions for now given age and patient being asymptomatic prior to afib   9.  Continue aspirin, at 81 mg daily, and atorvastatin 20 mg daily    HPI: Melvi Albarran Ansley De La Torre is a 70y.o. year old female who has a history of hypertension, hyperlipidemia, moderate MR [goal in June 2021], obstructive coronary artery disease, follows with Dr. Michelle Clinton outpatient. She presented today following an outpatient cardiogram which was done as a surveillance for mitral regurgitation when he was found to be in fib with RVR. States that over the past month she has been feeling fatigued/dyspneic with minimal exertion and palpitations. Chest pain, dizziness, presyncope, or syncope. No orthopnea, PND, lower extremity swelling. On arrival to the emergency department, she was found to be in A-fib with RVR with a heart rate of 120-140 bpm.  Hemodynamically stable with a blood pressure of 140/90. Electrolytes were within normal limits. HS Troponin 7, . Prior cardiac history includes exertional chest pain in 2021 when she first saw Dr. Michelle Clinton. She underwent a nuclear stress test in June 2021 which showed hypertensive response to exercise with abnormal EKG and normal perfusion study. Subsequent left heart catheterization in February 2022 showed no obstructive coronary artery disease. There was 50% stenosis of ostial circumflex which has been managed medically. Patient denies recent exertional angina. She was also found to have moderate MR, last echo was in June 2021 with a preserved EF of 60%, and mild to moderately dilated left atrium. She has been asymptomatic from mitral regurgitation. No family history of coronary artery disease, per chart review, sister has had a cardiac murmur. Patient does not snore at night. No excessive use of alcohol. TSH is 2.38. Presenting EKG    Review of Systems   Constitutional: Positive for malaise/fatigue. HENT: Negative. Eyes: Negative. Cardiovascular: Positive for dyspnea on exertion, irregular heartbeat and palpitations. Negative for chest pain, leg swelling, near-syncope, orthopnea, paroxysmal nocturnal dyspnea and syncope. Respiratory: Negative for shortness of breath and wheezing. Musculoskeletal: Negative. Gastrointestinal: Negative. Neurological: Negative. Psychiatric/Behavioral: Negative. All other systems reviewed and are negative. Historical Information   Past Medical History:   Diagnosis Date   • Anxiety    • Diverticulosis    • GERD (gastroesophageal reflux disease)    • Hiatal hernia    • Hypertension    • Melanoma ([address])    • Osteoporosis      Past Surgical History:   Procedure Laterality Date   • CARDIAC CATHETERIZATION N/A 2/24/2022    Procedure: Cardiac catheterization;  Surgeon: Isaak Zimmer MD;  Location: [facility]; Service: Cardiology   • CARDIAC CATHETERIZATION N/A 2/24/2022    Procedure: Cardiac Coronary Angiogram;  Surgeon: Isaak Zimmer MD;  Location: [facility]; Service: Cardiology   • EGD AND COLONOSCOPY  05/14/2014    NORMAL/HP     Social History     Substance and Sexual Activity   Alcohol Use Yes    Comment: very rare socially     Social History     Substance and Sexual Activity   Drug Use Not on file     Social History     Tobacco Use   Smoking Status Never   Smokeless Tobacco Never   Tobacco Comments    No secondhand smoke exposure      Family History:   Family History   Problem Relation Age of Onset   • Uterine cancer Mother    • Prostate cancer Father    • Osteoporosis Sister        Meds/Allergies   all current active meds have been reviewed, current meds:   Current Facility-Administered Medications   Medication Dose Route Frequency   • metoprolol tartrate (LOPRESSOR) tablet 25 mg  25 mg Oral Q6H    and PTA meds:   Prior to Admission Medications   Prescriptions Last Dose Informant Patient Reported? Taking?    ALPRAZolam (XANAX) 0.25 mg tablet  Self No No   Sig: Take 1 tablet (0.25 mg total) by mouth daily as needed for anxiety   ASPIRIN 81 PO  Self Yes No   Sig: Take 81 mg by mouth in the morning   BIOTIN PO  Self Yes No   Sig: Take by mouth   Cholecalciferol (VITAMIN D3) 1000 units CAPS  Self Yes No   Sig: Take 2 capsules by mouth daily   Flowflex COVID-19 Ag Home Test KIT  Self Yes No   Sig: Use as directed   Lactobacillus Rhamnosus, GG, (CULTURELLE PO)  Self Yes No   Sig: Take by mouth daily   Vitamin Mixture ([place]-C) 500-60 MG TABS  Self Yes No   Sig: Take by mouth   amLODIPine (NORVASC) 2.5 mg tablet   No No   Sig: TAKE 1 TABLET(2.5 MG) BY MOUTH DAILY   atorvastatin (LIPITOR) 20 mg tablet  Self No No   Sig: Take 1 tablet (20 mg total) by mouth daily   b complex vitamins capsule  Self Yes No   Sig: Take 1 capsule by mouth daily   calcium carbonate (OS-BRYCE) 600 MG tablet  Self Yes No   Sig: Take 600 mg by mouth 2 (two) times a day with meals      Facility-Administered Medications: None          Allergies   Allergen Reactions   • Macrobid [Nitrofurantoin] GI Intolerance   • Penicillins Other (See Comments)     Childhood. Per pt, unsure of reaction   • Protonix [Pantoprazole] Nausea Only   • Wound Dressing Adhesive Other (See Comments)     bandaid if on for more than 24 hours       Objective   Vitals: Blood pressure 161/66, pulse (!) 116, temperature 98.6 °F (37 °C), temperature source Oral, resp. rate 18, SpO2 99 %, not currently breastfeeding. , There is no height or weight on file to calculate BMI.,   Orthostatic Blood Pressures    Flowsheet Row Most Recent Value   Blood Pressure 161/66 filed at 07/12/2023 1240   Patient Position - Orthostatic VS Lying filed at 07/12/2023 6594        Systolic (26HGF), VRW:521 , Min:137 , FBJ:896     Diastolic (36IBE), PWY:32, Min:66, Max:98    No intake or output data in the 24 hours ending 07/12/23 1251    Weight (last 2 days)     None          Invasive Devices     Peripheral Intravenous Line  Duration           Peripheral IV 07/12/23 Left;Proximal;Ventral (anterior) Antecubital <1 day                    Physical Exam: /66   Pulse (!) 116   Temp 98.6 °F (37 °C) (Oral)   Resp 18   SpO2 99%     General Appearance:    Alert, cooperative, no distress, appears stated age   Head:    Normocephalic, without obvious abnormality, atraumatic   Neck:   Supple, symmetrical, trachea midline, no adenopathy;     thyroid:  no enlargement/tenderness/nodules; no carotid    bruit or JVD   Back:     Symmetric, no curvature, ROM normal, no CVA tenderness   Lungs:     Clear to auscultation bilaterally, respirations unlabored   Chest Wall:    No tenderness or deformity    Heart:    Irregular rhythm, normal rate, systolic murmur best heard in   the mitral region   Abdomen:     Soft, non-tender, bowel sounds active all four quadrants,     no masses, no organomegaly   Extremities:   Extremities normal, atraumatic, no cyanosis or edema   Pulses:   2+ and symmetric all extremities   Skin:   Skin color, texture, turgor normal, no rashes or lesions   Neurologic:   CNII-XII intact, normal strength, sensation and reflexes     throughout           Laboratory Results:        CBC with diff:   Results from last 7 days   Lab Units 07/12/23  1153   WBC Thousand/uL 9.62   HEMOGLOBIN g/dL 14.4   HEMATOCRIT % 44.2   MCV fL 91   PLATELETS Thousands/uL 267   RBC Million/uL 4.87   MCH pg 29.6   MCHC g/dL 32.6   RDW % 14.2   MPV fL 9.5   NRBC AUTO /100 WBCs 0         CMP:  Results from last 7 days   Lab Units 07/12/23  1153   POTASSIUM mmol/L 4.2   CHLORIDE mmol/L 104   CO2 mmol/L 25   BUN mg/dL 25   CREATININE mg/dL 0.80   CALCIUM mg/dL 9.8   AST U/L 26   ALT U/L 33   ALK PHOS U/L 88   EGFR ml/min/1.73sq m 71         BMP:  Results from last 7 days   Lab Units 07/12/23  1153   POTASSIUM mmol/L 4.2   CHLORIDE mmol/L 104   CO2 mmol/L 25   BUN mg/dL 25   CREATININE mg/dL 0.80   CALCIUM mg/dL 9.8       BNP:    Recent Labs     07/12/23  1153   *       Magnesium:       Coags:   Results from last 7 days   Lab Units 07/12/23  1153   PTT seconds 32   INR  1.07       TSH:       Hemoglobin A1C       Lipid Profile:         Cardiac testing:   Results for orders placed during the hospital encounter of 21    Echo complete with contrast if indicated    Narrative  [address] [address],  [facility]  [phone]    Transthoracic Echocardiogram  2D, M-mode, Doppler, and Color Doppler    Study date:  2021    Patient: Yasmine Donnelly  MR number: [id number]  Account number: [de-identified]  : 1945  Age: 78 years  Gender: Female  Status: Outpatient  Location: Echo lab  Height: 64 in  Weight: 130 lb  BP: 148/ 76 mmHg    Indications: SOB. Diagnoses: R06.02 - Shortness of breath    Sonographer:  MG Shen  Interpreting Physician:  Sg Alfaro MD  Primary Physician:  Sharon Puga. [facility]  Referring Physician:  Nedra Quiñonez MD  Group:  [facility]  Cardiology Fellow:  Sunny Cotton DO    SUMMARY    PROCEDURE INFORMATION:  This was a technically difficult study. LEFT VENTRICLE:  Size was normal.  Systolic function was normal. Ejection fraction was estimated to be 60 %. There was mild concentric hypertrophy. Features were consistent with a pseudonormal left ventricular filling pattern, with concomitant abnormal relaxation and increased filling pressure (grade 2 diastolic dysfunction). RIGHT VENTRICLE:  The size was normal.  Systolic function was normal.    LEFT ATRIUM:  The atrium was mildly to moderately dilated. MITRAL VALVE:  There was moderate regurgitation. AORTIC VALVE:  There was trace regurgitation. TRICUSPID VALVE:  There was trace regurgitation. HISTORY: PRIOR HISTORY: MR;SOB;HTN;HLD. PROCEDURE: The procedure was performed in the echo lab. This was a routine study. The transthoracic approach was used. The study included complete 2D imaging, M-mode, complete spectral Doppler, and color Doppler. The heart rate was 83 bpm,  at the start of the study. Images were obtained from the parasternal, apical, subcostal, and suprasternal notch acoustic windows.  This was a technically difficult study. LEFT VENTRICLE: Size was normal. Systolic function was normal. Ejection fraction was estimated to be 60 %. Although no diagnostic regional wall motion abnormality was identified, this possibility cannot be completely excluded on the basis  of this study. Wall thickness was at the upper limits of normal. There was mild concentric hypertrophy. DOPPLER: Features were consistent with a pseudonormal left ventricular filling pattern, with concomitant abnormal relaxation and  increased filling pressure (grade 2 diastolic dysfunction). RIGHT VENTRICLE: The size was normal. Systolic function was normal.    LEFT ATRIUM: The atrium was mildly to moderately dilated. RIGHT ATRIUM: Size was normal.    MITRAL VALVE: There was moderate to marked annular calcification. There was normal leaflet separation. DOPPLER: The transmitral velocity was within the normal range. There was no evidence for stenosis. There was moderate regurgitation. AORTIC VALVE: The valve was probably trileaflet. Leaflets exhibited mildly to moderately increased thickness, mild calcification, and normal cuspal separation. DOPPLER: Transaortic velocity was within the normal range. This was a  technically difficult study but there does not appear to be severe stenotic disease. There was trace regurgitation. TRICUSPID VALVE: The valve structure was normal. There was normal leaflet separation. DOPPLER: The transtricuspid velocity was within the normal range. There was no evidence for stenosis. There was trace regurgitation. The tricuspid jet  envelope definition was inadequate for estimation of RV systolic pressure. There are no indirect findings suggestive of moderate or severe pulmonary hypertension. PULMONIC VALVE: Leaflets exhibited normal thickness, no calcification, and normal cuspal separation. DOPPLER: The transpulmonic velocity was within the normal range. There was trace regurgitation.     PERICARDIUM: There was no pericardial effusion. AORTA: The root exhibited normal size. SYSTEMIC VEINS: IVC: The inferior vena cava was normal in size and course. Respirophasic changes were normal.    SYSTEM MEASUREMENT TABLES    2D  %FS: 28.32 %  Ao Diam: 2.88 cm  EDV(Teich): 98.19 ml  EF(Teich): 54.74 %  ESV(Teich): 44.44 ml  IVSd: 0.98 cm  LA Area: 28.3 cm2  LA Diam: 4.45 cm  LVEDV MOD A4C: 85.09 ml  LVEF MOD A4C: 63.6 %  LVESV MOD A4C: 30.97 ml  LVIDd: 4.62 cm  LVIDs: 3.31 cm  LVLd A4C: 7.57 cm  LVLs A4C: 5.8 cm  LVPWd: 0.99 cm  RA Area: 13.87 cm2  RVIDd: 3.34 cm  SV MOD A4C: 54.11 ml  SV(Teich): 53.75 ml    CW  AR Dec [place]: 3.24 m/s2  AR Dec Time: 1441.28 ms  AR PHT: 417.97 ms  AR Vmax: 4.66 m/s  AR maxP.95 mmHg  TR Vmax: 3.79 m/s  TR maxP.36 mmHg    MM  TAPSE: 2.37 cm    PW  E' Sept: 0.08 m/s  E/E' Sept: 17.8  MV A Just[place]: 1 m/s  MV Dec [place]: 7.09 m/s2  MV DecT: 227.76 ms  MV E Just[place]: 1.5 m/s  MV E/A Ratio: 1.5  MV PHT: 66.05 ms  MVA By PHT: 3.33 cm2    [employer] Accredited Echocardiography Laboratory    Prepared and electronically signed by    Sg Alfaro MD  Signed 2021 13:14:49    No results found for this or any previous visit. No results found for this or any previous visit. Results for orders placed during the hospital encounter of 21    NM myocardial perfusion spect (stress and/or rest)    Narrative  [address] [place] [address], [address] [place] [address]    Stress Gated SPECT Myocardial Perfusion Imaging after Exercise    Patient: Yasmine Donnelly  MR number: [id number]  Account number: [de-identified]  : 1945  Age: 78 years  Gender: Female  Status: Outpatient  Location: [address] [place] [address]  Height: 64 in  Weight: 131 lb  BP: 154/ 68 mmHg    Diagnosis: R06.00 - Dyspnea, unspecified, R07.9 - Chest pain, unspecified, R94.39 - Abnormal result of other cardiovascular function study    Primary Physician:  Sharon Puga.  [facility]  RN:  Salma Stack, RN  Referring Physician:  Job Puckett MD  Technician:  Arujn Leonila:  [facility]  Report Prepared By[de-identified]  Cheryl Krishnan RN  Interpreting Physician:  Job Puckett MD    INDICATIONS: Detection of coronary artery disease. HISTORY: The patient is a 78year old  female. Chest pain status: chest/ throat discomfort. Other symptoms: dyspnea. Coronary artery disease risk factors: dyslipidemia, hypertension, and post-menopausal state. Cardiovascular  history: mitral valve regurgitation. Medications: a calcium channel blocker. Previous test results: equivocal stress echo(4/28/2021)    PHYSICAL EXAM: Baseline physical exam screening: normal lung exam, audible heart murmur    REST ECG: Normal sinus rhythm at a rate of 70 beats per minute. Diffuse nonspecific ST T wave changes. Abnormal EKG. PROCEDURE: The study was performed in the [facility]. Treadmill exercise testing was performed, using the Best protocol. Gated SPECT myocardial perfusion imaging was performed during stress. Systolic blood pressure was 154  mmHg, at the start of the study. Diastolic blood pressure was 68 mmHg, at the start of the study. The heart rate was 77 bpm, at the start of the study. IV double checked. BEST PROTOCOL:  HR bpm SBP mmHg DBP mmHg Symptoms  Baseline 77 154 68 none  Stage 1 108 190 58 none  Stage 2 142 194 58 fatigue  Immediate 134 204 50 same as above  Recovery 1 121 -- -- subsiding  Recovery 2 99 160 60 none  Recovery 3 82 140 62 none  No medications or fluids given. STRESS SUMMARY: Duration of exercise was 6 min and 0 sec. The patient exercised to protocol stage 2. Maximal work rate was 7 METs. Maximal heart rate during stress was 142 bpm ( 97 % of maximal predicted heart rate). The rate-pressure  product for the peak heart rate and blood pressure was 78315. There was no chest pain during stress.  The stress test was terminated due to achievement of maximal (symptom limited) exercise. Pre oxygen saturation: 100 %. Peak oxygen  saturation: 99 %. With exercise, there was 1.5-1.8 mm of downsloping ST depression in leads 2, 3, and AVF. In addition there was 1.4-1.8 mm of ST depression in leads V4 through V6. There were occasional premature ventricular contractions  with occasional ventricular couplets. There were occasional premature atrial contractions. ISOTOPE ADMINISTRATION:  The radiopharmaceutical was injected one minute before the end of exercise. MYOCARDIAL PERFUSION IMAGING:  Tomographic perfusion series at rest demonstrated uniformly normal uptake in activity. Following graded treadmill exercise, there was no change. GATED SPECT:  Normal left ventricular systolic function, EF 65%. Normal left ventricular cavity size. Normal wall motion without regional wall motion abnormalities. SUMMARY:  -  Stress results: Duration of exercise was 6 min and 0 sec. Target heart rate was achieved. There was no chest pain during stress. IMPRESSIONS: 1. Good exercise tolerance  2. Resting hypertension with exaggerated hypertensive response to exercise  3. graded treadmill exercise test negative for symptoms of exertional angina pectoris but with abnormal ST depression with exercise  4. Ventricular ectopy as described during exercise  5. Normal left ventricular systolic function, EF 32%  6. normal tomographic perfusion series    Prepared and signed by    Jae Toussaint MD  Signed 06/24/2021 07:41:58        Imaging: I have personally reviewed pertinent reports. No results found.     EKG reviewed personally: Atrial fibrillation with RVR  Telemetry reviewed personally: Atrial fibrillation with RVR      Code Status: Prior

## 2023-07-12 NOTE — ASSESSMENT & PLAN NOTE
· BP well controlled at time of admission  · Maintain the patient on Norvasc 2.5 mg daily, hold in favor of metoprolol  · Start metoprolol 25 mg every 6 hours

## 2023-07-12 NOTE — QUICK NOTE
Patient converted to normal sinus rhythm. We will discontinue IV Cardizem infusion. We will begin Cardizem 60 mg p.o. every 6 hours for 3 doses. We will then follow with Cardizem  mg beginning 9 AM tomorrow morning and daily thereafter. We will continue metoprolol 25 mg every 6 hours p.o. with hold for heart rate less than 60. Will change in a.m. to every 12 hours metoprolol. We will keep patient n.p.o. in case she goes back into atrial fibrillation so that she will be a candidate for DEANGELO cardioversion tomorrow. If patient in sinus rhythm tomorrow morning will allow her to have breakfast    Would keep patient in the hospital until Friday morning make sure her oral medications are appropriately adjusted.

## 2023-07-13 DIAGNOSIS — I48.0 PAROXYSMAL ATRIAL FIBRILLATION (HCC): Primary | ICD-10-CM

## 2023-07-13 LAB
AORTIC ROOT: 2.9 CM
APICAL FOUR CHAMBER EJECTION FRACTION: 61 %
APTT PPP: 54 SECONDS (ref 23–37)
ASCENDING AORTA: 2.9 CM
DOP CALC LVOT AREA: 2.27 CM2
DOP CALC LVOT DIAMETER: 1.7 CM
FRACTIONAL SHORTENING: 41 % (ref 28–44)
INTERVENTRICULAR SEPTUM IN DIASTOLE (PARASTERNAL SHORT AXIS VIEW): 0.9 CM
INTERVENTRICULAR SEPTUM: 1.2 CM (ref 0.6–1.1)
IVC: 18.8 MM
LAAS-AP2: 28.8 CM2
LAAS-AP4: 22.4 CM2
LEFT ATRIUM SIZE: 4.6 CM
LEFT ATRIUM VOLUME (MOD BIPLANE): 93 ML
LEFT INTERNAL DIMENSION IN SYSTOLE: 3 CM (ref 2.1–4)
LEFT VENTRICULAR INTERNAL DIMENSION IN DIASTOLE: 5.1 CM (ref 3.5–6)
LEFT VENTRICULAR POSTERIOR WALL IN END DIASTOLE: 0.8 CM
LEFT VENTRICULAR STROKE VOLUME: 48 ML
LVSV (TEICH): 48 ML
RA PRESSURE ESTIMATED: 8 MMHG
RIGHT ATRIAL 2D VOLUME: 51 ML
RIGHT ATRIUM AREA SYSTOLE A4C: 18.4 CM2
RIGHT VENTRICLE ID DIMENSION: 3.4 CM
RV PSP: 59 MMHG
SL CV LEFT ATRIUM LENGTH A2C: 6.6 CM
SL CV LV EF: 60
SL CV PED ECHO LEFT VENTRICLE DIASTOLIC VOLUME (MOD BIPLANE) 2D: 84 ML
SL CV PED ECHO LEFT VENTRICLE SYSTOLIC VOLUME (MOD BIPLANE) 2D: 35 ML
TR MAX PG: 51 MMHG
TR PEAK VELOCITY: 3.6 M/S
TRICUSPID ANNULAR PLANE SYSTOLIC EXCURSION: 2.1 CM
TRICUSPID VALVE PEAK REGURGITATION VELOCITY: 3.58 M/S

## 2023-07-13 PROCEDURE — 99232 SBSQ HOSP IP/OBS MODERATE 35: CPT | Performed by: INTERNAL MEDICINE

## 2023-07-13 PROCEDURE — 85730 THROMBOPLASTIN TIME PARTIAL: CPT | Performed by: EMERGENCY MEDICINE

## 2023-07-13 PROCEDURE — 93306 TTE W/DOPPLER COMPLETE: CPT | Performed by: INTERNAL MEDICINE

## 2023-07-13 RX ORDER — METOPROLOL TARTRATE 50 MG/1
50 TABLET, FILM COATED ORAL EVERY 12 HOURS SCHEDULED
Status: DISCONTINUED | OUTPATIENT
Start: 2023-07-14 | End: 2023-07-14 | Stop reason: HOSPADM

## 2023-07-13 RX ORDER — METOPROLOL TARTRATE 50 MG/1
50 TABLET, FILM COATED ORAL ONCE
Status: COMPLETED | OUTPATIENT
Start: 2023-07-13 | End: 2023-07-13

## 2023-07-13 RX ORDER — FAMOTIDINE 20 MG/1
20 TABLET, FILM COATED ORAL DAILY
Status: DISCONTINUED | OUTPATIENT
Start: 2023-07-13 | End: 2023-07-14 | Stop reason: HOSPADM

## 2023-07-13 RX ORDER — LANOLIN ALCOHOL/MO/W.PET/CERES
3 CREAM (GRAM) TOPICAL
Status: DISCONTINUED | OUTPATIENT
Start: 2023-07-13 | End: 2023-07-14 | Stop reason: HOSPADM

## 2023-07-13 RX ORDER — METOPROLOL TARTRATE 50 MG/1
50 TABLET, FILM COATED ORAL EVERY 12 HOURS SCHEDULED
Status: CANCELLED | OUTPATIENT
Start: 2023-07-13

## 2023-07-13 RX ADMIN — RIVAROXABAN 20 MG: 20 TABLET, FILM COATED ORAL at 17:20

## 2023-07-13 RX ADMIN — METOPROLOL TARTRATE 25 MG: 25 TABLET, FILM COATED ORAL at 05:36

## 2023-07-13 RX ADMIN — FAMOTIDINE 20 MG: 20 TABLET ORAL at 14:07

## 2023-07-13 RX ADMIN — Medication 3 MG: at 20:54

## 2023-07-13 RX ADMIN — DILTIAZEM HYDROCHLORIDE 60 MG: 60 TABLET, FILM COATED ORAL at 03:32

## 2023-07-13 RX ADMIN — METOPROLOL TARTRATE 50 MG: 50 TABLET, FILM COATED ORAL at 16:04

## 2023-07-13 RX ADMIN — DILTIAZEM HYDROCHLORIDE 240 MG: 120 CAPSULE, COATED, EXTENDED RELEASE ORAL at 10:11

## 2023-07-13 RX ADMIN — HEPARIN SODIUM 1650 UNITS: 1000 INJECTION INTRAVENOUS; SUBCUTANEOUS at 03:32

## 2023-07-13 NOTE — PROGRESS NOTES
Cardiology Progress Note - Jana Saldana 70 y.o. female MRN: [id number]    Unit/Bed#: E4 -01 Encounter: [id number]      Assessment:  Principal Problem:    Atrial fibrillation with rapid ventricular response (HCC)  Active Problems:    Generalized anxiety disorder    Essential hypertension    Nonrheumatic mitral valve regurgitation    Nonobstructive atherosclerosis of coronary artery    Atrial fibrillation with RVR-new onset  Converted back to normal sinus rhythm while on diltiazem drip  Symptom onset about a month ago  Telemetry shows normal sinus rhythm, heart rate 60-70  Unclear trigger: Has moderate MR with dilated left atrium, TSH 2.39, BMI 22.31, no history of snoring, no excessive alcohol intake, electrolytes within normal limits  CHADVASC: 4  Current Meds: IV diltiazem has been transition to p.o. diltiazem CD2 140 mg daily, metoprolol titrate 25 mg every 6 hours  AC: IV heparin gtt     Nonobstructive coronary artery disease  History of exertional angina  Left heart cath 2/2022: 50% stenosis of ostial circumflex lesion  Currently with no active or exertional angina     Moderate MR  Echo in June 2021 shows moderate MR, moderately dilated left atrium, EF 60%  Patient has been has been asymptomatic with no CHF     Hypertension  Home meds: Amlodipine 2.5 mg daily     Hyperlipidemia  On atorvastatin 20 mg daily        Plan  1. Continue diltiazem CD2 140 mg daily  2. Metoprolol tartrate 50 mg twice daily  3. Will need to transition IV heparin to NOAC. Eliquis currently >$300, therefore will price check Xarelto. If this is not affordable, neck step would be Pradaxa  4. Would discontinue amlodipine on discharge  5. Would hold off on Mitral valve interventions for now given age and patient being asymptomatic prior to afib   6. Continue aspirin, at 81 mg daily, and atorvastatin 20 mg daily        Subjective:   Patient seen and examined. No significant events overnight.        Objective:     Vitals: Blood pressure 119/60, pulse 71, temperature 98.3 °F (36.8 °C), temperature source Temporal, resp. rate 18, height 5' 4" (1.626 m), weight 59 kg (130 lb), SpO2 95 %, not currently breastfeeding., Body mass index is 22.31 kg/m².,   Orthostatic Blood Pressures    Flowsheet Row Most Recent Value   Blood Pressure 119/60 filed at 07/13/2023 1110   Patient Position - Orthostatic VS Lying filed at 07/13/2023 1110          No intake or output data in the 24 hours ending 07/13/23 1152    No significant arrhythmias seen on telemetry review.        Physical Exam:    GEN: Laure Nuno appears well, alert and oriented x 3, pleasant and cooperative   HEENT: pupils equal, round, and reactive to light; extraocular muscles intact  NECK: supple, no carotid bruits   HEART: regular rhythm, normal S1 and S2, no murmurs, clicks, gallops or rubs   LUNGS: clear to auscultation bilaterally; no wheezes, rales, or rhonchi   ABDOMEN: normal bowel sounds, soft, no tenderness, no distention  EXTREMITIES: peripheral pulses normal; no clubbing, cyanosis, or edema  NEURO: no focal findings   SKIN: normal without suspicious lesions on exposed skin    Medications:      Current Facility-Administered Medications:   •  diltiazem (CARDIZEM CD) 24 hr capsule 240 mg, 240 mg, Oral, Daily, Audra Bruno MD, 240 mg at 07/13/23 1011  •  heparin (porcine) 25,000 units in 0.45% NaCl 250 mL infusion (premix), 3-20 Units/kg/hr (Order-Specific), Intravenous, Titrated, Yamil Nevarez MD, Last Rate: 7.7 mL/hr at 07/13/23 0336, 14 Units/kg/hr at 07/13/23 0336  •  heparin (porcine) injection 1,650 Units, 1,650 Units, Intravenous, Q6H PRN, Yamil Nevarez MD, 1,650 Units at 07/13/23 0332  •  heparin (porcine) injection 3,300 Units, 3,300 Units, Intravenous, Q6H PRN, Yamil Nevarez MD  •  metoprolol tartrate (LOPRESSOR) tablet 25 mg, 25 mg, Oral, Q6H, Audra Bruno MD, 25 mg at 07/13/23 0536     Labs & Results:        Results from last 7 days   Lab Units 07/12/23  1153   WBC Thousand/uL 9.62   HEMOGLOBIN g/dL 14.4   HEMATOCRIT % 44.2   PLATELETS Thousands/uL 267         Results from last 7 days   Lab Units 07/12/23  1153   POTASSIUM mmol/L 4.2   CHLORIDE mmol/L 104   CO2 mmol/L 25   BUN mg/dL 25   CREATININE mg/dL 0.80   CALCIUM mg/dL 9.8   ALK PHOS U/L 88   ALT U/L 33   AST U/L 26     Results from last 7 days   Lab Units 07/13/23  0243 07/12/23  2035 07/12/23  1153   INR   --   --  1.07   PTT seconds 54* 62* 32               EKG personally reviewed by Jeniffer Santos MD.

## 2023-07-13 NOTE — PROGRESS NOTES
07/13/23 1300   Sacramental Encounters   Communion Given Indicator Yes   Sacrament of Sick-Anointing Anointed

## 2023-07-13 NOTE — PLAN OF CARE
Problem: Potential for Falls  Goal: Patient will remain free of falls  Description: INTERVENTIONS:  - Educate patient/family on patient safety including physical limitations  - Instruct patient to call for assistance with activity   - Consult OT/PT to assist with strengthening/mobility   - Keep Call bell within reach  - Keep bed low and locked with side rails adjusted as appropriate  - Keep care items and personal belongings within reach  - Initiate and maintain comfort rounds  - Make Fall Risk Sign visible to staff  - Offer Toileting every 2 Hours, in advance of need  - Initiate/Maintain bed and chair alarm  - Obtain necessary fall risk management equipment: none  - Apply yellow socks and bracelet for high fall risk patients  - Consider moving patient to room near nurses station  Outcome: Progressing     Problem: PAIN - ADULT  Goal: Verbalizes/displays adequate comfort level or baseline comfort level  Description: Interventions:  - Encourage patient to monitor pain and request assistance  - Assess pain using appropriate pain scale  - Administer analgesics based on type and severity of pain and evaluate response  - Implement non-pharmacological measures as appropriate and evaluate response  - Consider cultural and social influences on pain and pain management  - Notify physician/advanced practitioner if interventions unsuccessful or patient reports new pain  Outcome: Progressing     Problem: SAFETY ADULT  Goal: Patient will remain free of falls  Description: INTERVENTIONS:  - Educate patient/family on patient safety including physical limitations  - Instruct patient to call for assistance with activity   - Consult OT/PT to assist with strengthening/mobility   - Keep Call bell within reach  - Keep bed low and locked with side rails adjusted as appropriate  - Keep care items and personal belongings within reach  - Initiate and maintain comfort rounds  - Make Fall Risk Sign visible to staff  - Offer Toileting every 2 Hours, in advance of need  - Initiate/Maintain bed alarm  - Obtain necessary fall risk management equipment:   - Apply yellow socks and bracelet for high fall risk patients  - Consider moving patient to room near nurses station  Outcome: Progressing     Problem: DISCHARGE PLANNING  Goal: Discharge to home or other facility with appropriate resources  Description: INTERVENTIONS:  - Identify barriers to discharge w/patient and caregiver  - Arrange for needed discharge resources and transportation as appropriate  - Identify discharge learning needs (meds, wound care, etc.)  - Arrange for interpretive services to assist at discharge as needed  - Refer to Case Management Department for coordinating discharge planning if the patient needs post-hospital services based on physician/advanced practitioner order or complex needs related to functional status, cognitive ability, or social support system  Outcome: Progressing     Problem: Knowledge Deficit  Goal: Patient/family/caregiver demonstrates understanding of disease process, treatment plan, medications, and discharge instructions  Description: Complete learning assessment and assess knowledge base.   Interventions:  - Provide teaching at level of understanding  - Provide teaching via preferred learning methods  Outcome: Progressing     Problem: CARDIOVASCULAR - ADULT  Goal: Maintains optimal cardiac output and hemodynamic stability  Description: INTERVENTIONS:  - Monitor I/O, vital signs and rhythm  - Monitor for S/S and trends of decreased cardiac output  - Administer and titrate ordered vasoactive medications to optimize hemodynamic stability  - Assess quality of pulses, skin color and temperature  - Assess for signs of decreased coronary artery perfusion  - Instruct patient to report change in severity of symptoms  Outcome: Progressing  Goal: Absence of cardiac dysrhythmias or at baseline rhythm  Description: INTERVENTIONS:  - Continuous cardiac monitoring, vital signs, obtain 12 lead EKG if ordered  - Administer antiarrhythmic and heart rate control medications as ordered  - Monitor electrolytes and administer replacement therapy as ordered  Outcome: Progressing

## 2023-07-13 NOTE — ASSESSMENT & PLAN NOTE
· Patient with cardiac catheterization in 2022 with less than 50% stenosis  · Denies chest pain at time of admission  · Troponin x 3 negative  · Hold aspirin in favor of anticoagulation

## 2023-07-13 NOTE — ASSESSMENT & PLAN NOTE
· BP well controlled at time of admission  · Holding amlodipine on admission in favor of heart rate lowering medications  · Closely monitor

## 2023-07-13 NOTE — PROGRESS NOTES
[facility]  Progress Note  Name: Lorena Espinosa  MRN: [id number]  Unit/Bed#: E4 -01 I Date of Admission: 7/12/2023   Date of Service: 7/13/2023 I Hospital Day: 1    Assessment/Plan   * Atrial fibrillation with rapid ventricular response [facility])  Assessment & Plan  · Patient was undergoing routine echocardiogram 7/12 when she was found to be in A-fib with RVR  · Does note intermittent palpitations over the past month  · Sent to the ED for evaluation and admission  · Seen in consult by cardiology  · Started on Cardizem drip and metoprolol 25 mg every 6 hours  · Transitioned over to Cardizem 240 mg daily and metoprolol tartrate 50 mg twice daily today  · Started on heparin drip, Eliquis not cost effective, price checked Xarelto and discussed with patient  · Start Xarelto 20 mg starting tonight with dinner as CrCl >50, D/C heparin drip at that time  · Remains in normal sinus rhythm  · Closely monitor HR, BP, and telemetry    Nonobstructive atherosclerosis of coronary artery  Assessment & Plan  · Patient with cardiac catheterization in 2022 with less than 50% stenosis  · Denies chest pain at time of admission  · Troponin x 3 negative  · Hold aspirin in favor of anticoagulation    Nonrheumatic mitral valve regurgitation  Assessment & Plan  · Patient was receiving routine echocardiogram for known mitral valve regurg when she was found to be in atrial fibrillation  · No evidence of volume overload    Essential hypertension  Assessment & Plan  · BP well controlled at time of admission  · Holding amlodipine on admission in favor of heart rate lowering medications  · Closely monitor    GERD without esophagitis  Assessment & Plan  · On prilosec at home, continue famotidine while in patient    Generalized anxiety disorder  Assessment & Plan  · Continue as needed Xanax      VTE Pharmacologic Prophylaxis: VTE Score: 3 Moderate Risk (Score 3-4) - Pharmacological DVT Prophylaxis Ordered: heparin drip.    Patient Centered Rounds: I performed bedside rounds with nursing staff today. Discussions with Specialists or Other Care Team Provider:     Education and Discussions with Family / Patient: Updated  () via phone. No answer, left voice mail. Total Time Spent on Date of Encounter in care of patient: 35 minutes This time was spent on one or more of the following: performing physical exam; counseling and coordination of care; obtaining or reviewing history; documenting in the medical record; reviewing/ordering tests, medications or procedures; communicating with other healthcare professionals and discussing with patient's family/caregivers. Current Length of Stay: 1 day(s)  Current Patient Status: Inpatient   Certification Statement: The patient will continue to require additional inpatient hospital stay due to atrial fibrillation control on medications  Discharge Plan: Anticipate discharge tomorrow to home. Code Status: Prior    Subjective:   Patient seen and examined. Denies chest pain, shortness of breath, heart palpitations, dizziness or lightheadedness. She has a good appetite and is eating well today. She does admit to some heartburn and did not get her Prilosec. Her  is going for testing today so she does not know if he will be in. Denies any nausea, vomiting or abdominal pain patient seen and examined. She reports she is feeling well. Denies any chest pain feeling well. Objective:     Vitals:   Temp (24hrs), Av.2 °F (36.8 °C), Min:97.2 °F (36.2 °C), Max:98.6 °F (37 °C)    Temp:  [97.2 °F (36.2 °C)-98.6 °F (37 °C)] 98.3 °F (36.8 °C)  HR:  [58-76] 71  Resp:  [16-18] 18  BP: (109-128)/(53-76) 119/60  SpO2:  [92 %-98 %] 95 %  Body mass index is 22.31 kg/m². Input and Output Summary (last 24 hours):   No intake or output data in the 24 hours ending 23 1350    Physical Exam:   Physical Exam  Vitals and nursing note reviewed.    Constitutional: General: She is not in acute distress. Appearance: Normal appearance. She is well-developed. She is not ill-appearing or diaphoretic. HENT:      Head: Normocephalic and atraumatic. Cardiovascular:      Rate and Rhythm: Normal rate and regular rhythm. Heart sounds: Murmur heard. Pulmonary:      Effort: Pulmonary effort is normal. No respiratory distress. Breath sounds: Normal breath sounds. No wheezing, rhonchi or rales. Abdominal:      General: Bowel sounds are normal. There is no distension. Palpations: Abdomen is soft. Tenderness: There is no abdominal tenderness. There is no guarding. Musculoskeletal:      Right lower leg: No edema. Left lower leg: No edema. Skin:     General: Skin is warm and dry. Neurological:      General: No focal deficit present. Mental Status: She is alert and oriented to person, place, and time.    Psychiatric:         Mood and Affect: Mood normal.         Behavior: Behavior normal.      Comments: Anxious         Additional Data:     Labs:  Results from last 7 days   Lab Units 07/12/23  1153   WBC Thousand/uL 9.62   HEMOGLOBIN g/dL 14.4   HEMATOCRIT % 44.2   PLATELETS Thousands/uL 267   NEUTROS PCT % 50   LYMPHS PCT % 41   MONOS PCT % 7   EOS PCT % 1     Results from last 7 days   Lab Units 07/12/23  1153   SODIUM mmol/L 137   POTASSIUM mmol/L 4.2   CHLORIDE mmol/L 104   CO2 mmol/L 25   BUN mg/dL 25   CREATININE mg/dL 0.80   ANION GAP mmol/L 8   CALCIUM mg/dL 9.8   ALBUMIN g/dL 4.5   TOTAL BILIRUBIN mg/dL 0.57   ALK PHOS U/L 88   ALT U/L 33   AST U/L 26   GLUCOSE RANDOM mg/dL 108     Results from last 7 days   Lab Units 07/12/23  1153   INR  1.07     Lines/Drains:  Invasive Devices     Peripheral Intravenous Line  Duration           Peripheral IV 07/12/23 Left;Proximal;Ventral (anterior) Antecubital 1 day    Peripheral IV 07/12/23 Right Antecubital <1 day              Telemetry:  Telemetry Orders (From admission, onward)             24 Hour Telemetry Monitoring  (ED Bridging Orders Panel)  Continuous x 24 Hours (Telem)        Question:  Reason for 24 Hour Telemetry  Answer:  Arrhythmias requiring acute medical intervention / PPM or ICD malfunction                 Telemetry Reviewed: Normal Sinus Rhythm  Indication for Continued Telemetry Use: Arrthymias requiring medical therapy      Imaging: Reviewed radiology reports from this admission including: chest xray    Recent Cultures (last 7 days):     Last 24 Hours Medication List:   Current Facility-Administered Medications   Medication Dose Route Frequency Provider Last Rate   • diltiazem  240 mg Oral Daily Silas Walsh MD     • famotidine  20 mg Oral Daily Steven Quick PA-C     • heparin (porcine)  3-20 Units/kg/hr (Order-Specific) Intravenous Titrated Aneudy Mckinney PA-C 14 Units/kg/hr (07/13/23 0336)   • [START ON 7/14/2023] metoprolol tartrate  50 mg Oral Q12H [facility] Bob Mathis MD     • metoprolol tartrate  50 mg Oral Once Bob Mathis MD     • rivaroxaban  20 mg Oral Daily With Dinner Aneudy Mckinney PA-C          Today, Patient Was Seen By: Aneudy Mckinney PA-C    **Please Note: This note may have been constructed using a voice recognition system. **

## 2023-07-13 NOTE — ASSESSMENT & PLAN NOTE
· Patient was undergoing routine echocardiogram 7/12 when she was found to be in A-fib with RVR  · Does note intermittent palpitations over the past month  · Sent to the ED for evaluation and admission  · Seen in consult by cardiology  · Started on Cardizem drip and metoprolol 25 mg every 6 hours  · Transitioned over to Cardizem 240 mg daily and metoprolol tartrate 50 mg twice daily today  · Started on heparin drip, Eliquis not cost effective, price checked Xarelto and discussed with patient  · Start Xarelto 20 mg starting tonight with dinner as CrCl >50, D/C heparin drip at that time  · Remains in normal sinus rhythm  · Closely monitor HR, BP, and telemetry

## 2023-07-14 ENCOUNTER — TELEPHONE (OUTPATIENT)
Dept: FAMILY MEDICINE CLINIC | Facility: CLINIC | Age: 78
End: 2023-07-14

## 2023-07-14 VITALS
TEMPERATURE: 98.1 F | HEART RATE: 71 BPM | RESPIRATION RATE: 19 BRPM | BODY MASS INDEX: 22.2 KG/M2 | DIASTOLIC BLOOD PRESSURE: 56 MMHG | OXYGEN SATURATION: 95 % | HEIGHT: 64 IN | SYSTOLIC BLOOD PRESSURE: 115 MMHG | WEIGHT: 130 LBS

## 2023-07-14 LAB
ANION GAP SERPL CALCULATED.3IONS-SCNC: 5 MMOL/L
BUN SERPL-MCNC: 22 MG/DL (ref 5–25)
CALCIUM SERPL-MCNC: 9.1 MG/DL (ref 8.4–10.2)
CHLORIDE SERPL-SCNC: 109 MMOL/L (ref 96–108)
CO2 SERPL-SCNC: 25 MMOL/L (ref 21–32)
CREAT SERPL-MCNC: 0.8 MG/DL (ref 0.6–1.3)
ERYTHROCYTE [DISTWIDTH] IN BLOOD BY AUTOMATED COUNT: 14.4 % (ref 11.6–15.1)
GFR SERPL CREATININE-BSD FRML MDRD: 71 ML/MIN/1.73SQ M
GLUCOSE SERPL-MCNC: 94 MG/DL (ref 65–140)
HCT VFR BLD AUTO: 39.5 % (ref 34.8–46.1)
HGB BLD-MCNC: 12.8 G/DL (ref 11.5–15.4)
MCH RBC QN AUTO: 29.8 PG (ref 26.8–34.3)
MCHC RBC AUTO-ENTMCNC: 32.4 G/DL (ref 31.4–37.4)
MCV RBC AUTO: 92 FL (ref 82–98)
PLATELET # BLD AUTO: 246 THOUSANDS/UL (ref 149–390)
PMV BLD AUTO: 9.9 FL (ref 8.9–12.7)
POTASSIUM SERPL-SCNC: 3.9 MMOL/L (ref 3.5–5.3)
RBC # BLD AUTO: 4.3 MILLION/UL (ref 3.81–5.12)
SODIUM SERPL-SCNC: 139 MMOL/L (ref 135–147)
WBC # BLD AUTO: 8.03 THOUSAND/UL (ref 4.31–10.16)

## 2023-07-14 PROCEDURE — 80048 BASIC METABOLIC PNL TOTAL CA: CPT

## 2023-07-14 PROCEDURE — 99239 HOSP IP/OBS DSCHRG MGMT >30: CPT

## 2023-07-14 PROCEDURE — 85027 COMPLETE CBC AUTOMATED: CPT

## 2023-07-14 PROCEDURE — 99232 SBSQ HOSP IP/OBS MODERATE 35: CPT | Performed by: INTERNAL MEDICINE

## 2023-07-14 RX ORDER — METOPROLOL TARTRATE 50 MG/1
50 TABLET, FILM COATED ORAL EVERY 12 HOURS SCHEDULED
Qty: 60 TABLET | Refills: 0 | Status: SHIPPED | OUTPATIENT
Start: 2023-07-14 | End: 2023-07-27 | Stop reason: CLARIF

## 2023-07-14 RX ORDER — DILTIAZEM HYDROCHLORIDE 240 MG/1
240 CAPSULE, COATED, EXTENDED RELEASE ORAL DAILY
Qty: 30 CAPSULE | Refills: 0 | Status: SHIPPED | OUTPATIENT
Start: 2023-07-15 | End: 2023-07-27 | Stop reason: SDUPTHER

## 2023-07-14 RX ADMIN — METOPROLOL TARTRATE 50 MG: 50 TABLET, FILM COATED ORAL at 09:12

## 2023-07-14 RX ADMIN — DILTIAZEM HYDROCHLORIDE 240 MG: 120 CAPSULE, COATED, EXTENDED RELEASE ORAL at 09:12

## 2023-07-14 RX ADMIN — FAMOTIDINE 20 MG: 20 TABLET ORAL at 09:13

## 2023-07-14 NOTE — TELEPHONE ENCOUNTER
----- Message from Melina Mcdermott PA-C sent at 7/14/2023 10:53 AM EDT -----  Regarding: Discharge  Thank you for allowing us to participate in the care of your patient, Alisha Benton, who was hospitalized from 7/12/2023 through 7/14/2023 with the admitting diagnosis of atrial fibrillation with rapid ventricular rate. Patient was started on oral diltiazem and metoprolol with improvement in her heart rate. Her blood pressure remained well controlled throughout admission and her amlodipine was discontinued on discharge as well as her aspirin. She was started on a daily Xarelto 20 mg daily. She was seen in consultation by cardiology and she will have close follow-up in the outpatient setting we will have her get a CBC and BMP within 1 week and have this sent to you. Thank so much have a great weekend      If you have any additional questions or would like to discuss further, please feel free to contact me.     [facility], Hospitalist  [phone]

## 2023-07-14 NOTE — ASSESSMENT & PLAN NOTE
· Patient with cardiac catheterization in 2022 with less than 50% stenosis  · Denies chest pain at time of admission  · Troponin x 3 negative  · Holding aspirin on discharge in favor of anticoagulation with Xarelto, discussed with patient

## 2023-07-14 NOTE — ASSESSMENT & PLAN NOTE
· Patient was undergoing routine echocardiogram 7/12 when she was found to be in A-fib with RVR  · Now remains in normal sinus rhythm with adequate heart rate and blood pressure control  · Continue Cardizem 240 mg 1 capsule daily in the morning and metoprolol tartrate 50 mg tablet twice daily on discharge  · Continue home on Xarelto 20 mg 1 tablet daily  · Close follow-up in the outpatient setting with Dr. Juan Ramon Marion, 1-2 weeks.  Referral placed  · CBC and BMP in 1 week post discharge

## 2023-07-14 NOTE — PROGRESS NOTES
Cardiology Progress Note - Parish Dumont 70 y.o. female MRN: [id number]    Unit/Bed#: E4 -01 Encounter: [id number]      Assessment:  Principal Problem:    Atrial fibrillation with rapid ventricular response (HCC)  Active Problems:    Generalized anxiety disorder    GERD without esophagitis    Essential hypertension    Nonrheumatic mitral valve regurgitation    Nonobstructive atherosclerosis of coronary artery    Atrial fibrillation with RVR-new onset  Converted back to normal sinus rhythm and maintaining NSR  Symptom onset about a month ago  Telemetry shows normal sinus rhythm, heart rate 60-70  Unclear trigger: Has moderate MR with dilated left atrium, TSH 2.39, BMI 22.31, no history of snoring, no excessive alcohol intake, electrolytes within normal limits  CHADVASC: 4  Current Meds: IV diltiazem has been transition to p.o. diltiazem  mg daily, metoprolol tartrate 50 mg bid  AC: xarelto     Nonobstructive coronary artery disease  History of exertional angina  Left heart cath 2/2022: 50% stenosis of ostial circumflex lesion  Currently with no active or exertional angina     Moderate MR  Echo in June 2021 shows moderate MR, moderately dilated left atrium, EF 60%  Patient has been has been asymptomatic with no CHF     Hypertension  Home meds: Amlodipine 2.5 mg daily     Hyperlipidemia  On atorvastatin 20 mg daily        Plan  1. Continue diltiazem  mg daily  2. Metoprolol tartrate 50 mg twice daily  3. Continue xarelto  4. Discontinue amlodipine on discharge  5. Would hold off on Mitral valve interventions for now given age and patient being asymptomatic prior to afib   6. Continue aspirin, at 81 mg daily, and atorvastatin 20 mg daily  7. Stable for discharge from cardiac standpoint. Outpatient office will reach out to patient regarding a follow up visit in 2 weeks      Subjective:   Patient seen and examined. No significant events overnight.       Objective:     Vitals: Blood pressure 115/56, pulse 71, temperature 98.1 °F (36.7 °C), temperature source Temporal, resp. rate 19, height 5' 4" (1.626 m), weight 59 kg (130 lb), SpO2 95 %, not currently breastfeeding., Body mass index is 22.31 kg/m².,   Orthostatic Blood Pressures    Flowsheet Row Most Recent Value   Blood Pressure 115/56 filed at 07/14/2023 0725   Patient Position - Orthostatic VS Lying filed at 07/14/2023 0725          No intake or output data in the 24 hours ending 07/14/23 1055    No significant arrhythmias seen on telemetry review.        Physical Exam:    GEN: Charity Odonnell appears well, alert and oriented x 3, pleasant and cooperative   HEENT: pupils equal, round, and reactive to light; extraocular muscles intact  NECK: supple, no carotid bruits   HEART: regular rhythm, normal S1 and S2, no murmurs, clicks, gallops or rubs   LUNGS: clear to auscultation bilaterally; no wheezes, rales, or rhonchi   ABDOMEN: normal bowel sounds, soft, no tenderness, no distention  EXTREMITIES: peripheral pulses normal; no clubbing, cyanosis, or edema  NEURO: no focal findings   SKIN: normal without suspicious lesions on exposed skin    Medications:      Current Facility-Administered Medications:   •  diltiazem (CARDIZEM CD) 24 hr capsule 240 mg, 240 mg, Oral, Daily, Dung hO MD, 240 mg at 07/14/23 0912  •  famotidine (PEPCID) tablet 20 mg, 20 mg, Oral, Daily, Steven Quick PA-C, 20 mg at 07/14/23 0913  •  melatonin tablet 3 mg, 3 mg, Oral, HS, Steven Quick PA-C, 3 mg at 07/13/23 2054  •  metoprolol tartrate (LOPRESSOR) tablet 50 mg, 50 mg, Oral, Q12H [address], Caroll Lesches, MD, 50 mg at 07/14/23 0912  •  rivaroxaban (XARELTO) tablet 20 mg, 20 mg, Oral, Daily With Dinner, Glenis Libman, PA-C, 20 mg at 07/13/23 1720     Labs & Results:        Results from last 7 days   Lab Units 07/14/23  0509 07/12/23  1153   WBC Thousand/uL 8.03 9.62   HEMOGLOBIN g/dL 12.8 14.4   HEMATOCRIT % 39.5 44.2   PLATELETS Thousands/uL 246 267         Results from last 7 days   Lab Units 07/14/23  0509 07/12/23  1153   POTASSIUM mmol/L 3.9 4.2   CHLORIDE mmol/L 109* 104   CO2 mmol/L 25 25   BUN mg/dL 22 25   CREATININE mg/dL 0.80 0.80   CALCIUM mg/dL 9.1 9.8   ALK PHOS U/L  --  88   ALT U/L  --  33   AST U/L  --  26     Results from last 7 days   Lab Units 07/13/23  0243 07/12/23  2035 07/12/23  1153   INR   --   --  1.07   PTT seconds 54* 62* 32         EKG personally reviewed by Enma Lam MD.

## 2023-07-14 NOTE — DISCHARGE SUMMARY
[address] [facility]  Discharge- Lori Castro 1945, 70 y.o. female MRN: [id number]  Unit/Bed#: E4 -01 Encounter: [id number]  Primary Care Provider: Hola Vail PA-C   Date and time admitted to hospital: 7/12/2023 11:39 AM    * Atrial fibrillation with rapid ventricular response [facility])  Assessment & Plan  · Patient was undergoing routine echocardiogram 7/12 when she was found to be in A-fib with RVR  · Now remains in normal sinus rhythm with adequate heart rate and blood pressure control  · Continue Cardizem 240 mg 1 capsule daily in the morning and metoprolol tartrate 50 mg tablet twice daily on discharge  · Continue home on Xarelto 20 mg 1 tablet daily  · Close follow-up in the outpatient setting with Dr. Michelle Clinton, 1-2 weeks. Referral placed  · CBC and BMP in 1 week post discharge    Nonobstructive atherosclerosis of coronary artery  Assessment & Plan  · Patient with cardiac catheterization in 2022 with less than 50% stenosis  · Denies chest pain at time of admission  · Troponin x 3 negative  · Holding aspirin on discharge in favor of anticoagulation with Xarelto, discussed with patient    Nonrheumatic mitral valve regurgitation  Assessment & Plan  · Patient was receiving routine echocardiogram for known mitral valve regurg when she was found to be in atrial fibrillation  · No evidence of volume overload    Essential hypertension  Assessment & Plan  · BP well controlled throughout admission with newly added medications  · Discontinue amlodipine at home in favor of newly added medications.   Discussed with patient    GERD without esophagitis  Assessment & Plan  · Continue Prilosec at home    Generalized anxiety disorder  Assessment & Plan  · Continue as needed Xanax    Medical Problems     Resolved Problems  Date Reviewed: 7/14/2023   None       Discharging Physician / Practitioner: Jabari Patel PA-C  PCP: Hola Vail PA-C  Admission Date:   Admission Orders (From admission, onward)     Ordered        07/12/23 1405  INPATIENT ADMISSION  Once                      Discharge Date: 07/14/23    Consultations During Hospital Stay:  · Cardiology    Procedures Performed:     Significant Findings / Test Results:     Echo complete w/ contrast if indicated  Result Date: 7/13/2023  Narrative: •  Left Ventricle: Left ventricular cavity size is normal. Wall thickness is normal. The left ventricular ejection fraction is 60%. Systolic function is normal. Wall motion is normal. •  IVS: There is sigmoid appearance of the septum. •  Left Atrium: The atrium is severely dilated (>48 mL/m2). •  Right Atrium: The atrium is dilated. •  Aortic Valve: The aortic valve is trileaflet. The leaflets are moderately thickened. There is mildly reduced mobility. There is aortic valve sclerosis. •  Mitral Valve: There is moderate annular calcification. The posterior leaflet is likely prolapsed There is moderate regurgitation. •  Tricuspid Valve: There is mild to moderate regurgitation. The right ventricular systolic pressure is moderately elevated. The estimated right ventricular systolic pressure is 63.92 mmHg. XR chest 1 view portable  Result Date: 7/12/2023  Impression: No acute cardiopulmonary disease. Incidental Findings:     Test Results Pending at Discharge (will require follow up): Outpatient Tests Requested:  · CBC and platelet, BMP in 1 week postdischarge  · Defer rest of testing to outpatient providers    Complications:      Reason for Admission: Atrial fibrillation with rapid ventricular rate    Hospital Course:   Darling Beck is a 70 y.o. female patient who originally presented to the hospital on 7/12/2023 due to true fibrillation with rapid ventricular rate. She was undergoing a routine echocardiogram on 7/12/2023 when she was found to be in this abnormal heart rhythm. Patient did note at time of admission she had in her mid palpitations over the past month.   She was seen in consult by cardiology and started on a Cardizem drip as well as metoprolol 25 mg every 6 hours. Patient converted to normal sinus rhythm and was transitioned over to Cardizem 240 mg daily and metoprolol tartrate 50 mg twice daily with adequate control of heart rate and blood pressure. She was monitored an additional day to ensure heart rate and blood pressure remained controlled. When patient was found to be in atrial fibrillation she was started on a heparin drip and Eliquis was price checked but this is not cost effective. Xarelto ended up being the more cost effective of the 2 and patient was started on 20 mg daily here. Patient will have close follow-up in the outpatient setting with Dr. Rasheeda Zarco and her PCP. Please see above list of diagnoses and related plan for additional information. Condition at Discharge: good    Discharge Day Visit / Exam:   Subjective: Patient seen and examined. Reports she is feeling great and slept very well last night with the melatonin. Denies any chest pain, heart palpitations, shortness of breath, dizziness or lightheadedness. She is ready to go home. Vitals: Blood Pressure: 115/56 (07/14/23 0725)  Pulse: 71 (07/14/23 0725)  Temperature: 98.1 °F (36.7 °C) (07/14/23 0725)  Temp Source: Temporal (07/14/23 0725)  Respirations: 19 (07/14/23 0725)  Height: 5' 4" (162.6 cm) (07/12/23 1330)  Weight - Scale: 59 kg (130 lb) (07/12/23 1330)  SpO2: 95 % (07/14/23 0725)     Exam:   Physical Exam  Vitals and nursing note reviewed. Constitutional:       General: She is not in acute distress. Appearance: Normal appearance. She is well-developed. She is not ill-appearing or diaphoretic. Cardiovascular:      Rate and Rhythm: Normal rate and regular rhythm. Heart sounds: Murmur heard. Pulmonary:      Effort: Pulmonary effort is normal. No respiratory distress. Breath sounds: Normal breath sounds. No wheezing, rhonchi or rales.    Abdominal:      General: Bowel sounds are normal. There is no distension. Palpations: Abdomen is soft. Tenderness: There is no abdominal tenderness. There is no guarding. Musculoskeletal:      Right lower leg: No edema. Left lower leg: No edema. Skin:     General: Skin is warm and dry. Neurological:      General: No focal deficit present. Mental Status: She is alert and oriented to person, place, and time. Psychiatric:         Mood and Affect: Mood normal.        Discussion with Family: Patient updating . Discharge instructions/Information to patient and family:   See after visit summary for information provided to patient and family. Provisions for Follow-Up Care:  See after visit summary for information related to follow-up care and any pertinent home health orders. Disposition:   Home    Planned Readmission: No     Discharge Statement:  I spent 60 minutes discharging the patient. This time was spent on the day of discharge. I had direct contact with the patient on the day of discharge. Greater than 50% of the total time was spent examining patient, answering all patient questions, arranging and discussing plan of care with patient as well as directly providing post-discharge instructions. Additional time then spent on discharge activities. Discharge Medications:  See after visit summary for reconciled discharge medications provided to patient and/or family.       **Please Note: This note may have been constructed using a voice recognition system**

## 2023-07-14 NOTE — ASSESSMENT & PLAN NOTE
· BP well controlled throughout admission with newly added medications  · Discontinue amlodipine at home in favor of newly added medications.   Discussed with patient

## 2023-07-14 NOTE — DISCHARGE INSTR - AVS FIRST PAGE
You can stop your amlodipine and aspirin at home    Your new medications include (these were sent to the pharmacy here)  Diltiazem also known as Cardizem. You will take 1 capsule every morning. This is 240 mg. You will start taking this tomorrow on 7/15/2023  Metoprolol tartrate also known as Lopressor. You will take 1 tablet every 12 hours. This is 50 mg a tablet. You will start taking this one in the evening today, 7/14/2023. We have you scheduled to take it at 9 pm tonight (7/14/2023). Rivaroxaban also Xarelto. You will take 1 tablet daily with the biggest meal of the day, this can be breakfast or dinner. This is a 20 mg tablet. We have you scheduled to take it here between 4-5 pm today (7/14/2023)    Please call your family doctor within 1 week and have follow-up since you were hospitalized    Please follow-up with Dr. Sameera Man in the outpatient setting. He would like to see you in the office in about 1 to 2 weeks after your discharge. Please get repeat blood work in the outpatient setting. Aim to get this within 1 week of your discharge. We will have this sent to your family doctor and Dr. Sameera Man. It was a pleasure taking care of you.

## 2023-07-18 ENCOUNTER — TRANSITIONAL CARE MANAGEMENT (OUTPATIENT)
Dept: FAMILY MEDICINE CLINIC | Facility: CLINIC | Age: 78
End: 2023-07-18

## 2023-07-18 ENCOUNTER — TELEPHONE (OUTPATIENT)
Dept: CARDIOLOGY CLINIC | Facility: CLINIC | Age: 78
End: 2023-07-18

## 2023-07-18 NOTE — TELEPHONE ENCOUNTER
PC from patient who is on three new medications, Diltiazem. Xarelto and Metoprolol. She is experiencing extreme fatigue, and "out of it not not wanting to do anything". She is guessing it is the Metoprolol. She "wants her life back". She does have an appointment with Dr Sam Samuel on 7/27/23 for HFU. She doesn't feel like this can wait until the appointment. Please advise, please.

## 2023-07-19 ENCOUNTER — RA CDI HCC (OUTPATIENT)
Dept: OTHER | Facility: HOSPITAL | Age: 78
End: 2023-07-19

## 2023-07-20 ENCOUNTER — APPOINTMENT (OUTPATIENT)
Dept: LAB | Facility: CLINIC | Age: 78
End: 2023-07-20
Payer: MEDICARE

## 2023-07-20 DIAGNOSIS — I48.91 NEW ONSET ATRIAL FIBRILLATION (HCC): ICD-10-CM

## 2023-07-20 DIAGNOSIS — I48.91 ATRIAL FIBRILLATION WITH RAPID VENTRICULAR RESPONSE (HCC): ICD-10-CM

## 2023-07-20 LAB
ANION GAP SERPL CALCULATED.3IONS-SCNC: 5 MMOL/L
BUN SERPL-MCNC: 22 MG/DL (ref 5–25)
CALCIUM SERPL-MCNC: 9.3 MG/DL (ref 8.3–10.1)
CHLORIDE SERPL-SCNC: 108 MMOL/L (ref 96–108)
CO2 SERPL-SCNC: 27 MMOL/L (ref 21–32)
CREAT SERPL-MCNC: 0.85 MG/DL (ref 0.6–1.3)
ERYTHROCYTE [DISTWIDTH] IN BLOOD BY AUTOMATED COUNT: 14 % (ref 11.6–15.1)
GFR SERPL CREATININE-BSD FRML MDRD: 66 ML/MIN/1.73SQ M
GLUCOSE P FAST SERPL-MCNC: 97 MG/DL (ref 65–99)
HCT VFR BLD AUTO: 39.8 % (ref 34.8–46.1)
HGB BLD-MCNC: 12.9 G/DL (ref 11.5–15.4)
MCH RBC QN AUTO: 29.9 PG (ref 26.8–34.3)
MCHC RBC AUTO-ENTMCNC: 32.4 G/DL (ref 31.4–37.4)
MCV RBC AUTO: 92 FL (ref 82–98)
PLATELET # BLD AUTO: 284 THOUSANDS/UL (ref 149–390)
PMV BLD AUTO: 9.7 FL (ref 8.9–12.7)
POTASSIUM SERPL-SCNC: 4.2 MMOL/L (ref 3.5–5.3)
RBC # BLD AUTO: 4.32 MILLION/UL (ref 3.81–5.12)
SODIUM SERPL-SCNC: 140 MMOL/L (ref 135–147)
WBC # BLD AUTO: 6.71 THOUSAND/UL (ref 4.31–10.16)

## 2023-07-20 PROCEDURE — 85027 COMPLETE CBC AUTOMATED: CPT

## 2023-07-20 PROCEDURE — 80048 BASIC METABOLIC PNL TOTAL CA: CPT

## 2023-07-20 PROCEDURE — 36415 COLL VENOUS BLD VENIPUNCTURE: CPT

## 2023-07-22 PROBLEM — R01.1 MURMUR, HEART: Status: RESOLVED | Noted: 2021-01-09 | Resolved: 2023-07-22

## 2023-07-22 PROBLEM — I48.91 ATRIAL FIBRILLATION WITH RAPID VENTRICULAR RESPONSE (HCC): Status: RESOLVED | Noted: 2023-07-12 | Resolved: 2023-07-22

## 2023-07-22 PROBLEM — I35.8 AORTIC VALVE SCLEROSIS: Status: ACTIVE | Noted: 2023-07-22

## 2023-07-22 PROBLEM — I48.0 PAROXYSMAL ATRIAL FIBRILLATION (HCC): Status: ACTIVE | Noted: 2023-07-22

## 2023-07-25 ENCOUNTER — OFFICE VISIT (OUTPATIENT)
Dept: FAMILY MEDICINE CLINIC | Facility: CLINIC | Age: 78
End: 2023-07-25
Payer: MEDICARE

## 2023-07-25 VITALS
DIASTOLIC BLOOD PRESSURE: 60 MMHG | SYSTOLIC BLOOD PRESSURE: 112 MMHG | HEIGHT: 64 IN | WEIGHT: 127 LBS | HEART RATE: 80 BPM | BODY MASS INDEX: 21.68 KG/M2

## 2023-07-25 DIAGNOSIS — Z12.31 ENCOUNTER FOR SCREENING MAMMOGRAM FOR MALIGNANT NEOPLASM OF BREAST: Primary | ICD-10-CM

## 2023-07-25 DIAGNOSIS — I48.0 PAROXYSMAL ATRIAL FIBRILLATION (HCC): ICD-10-CM

## 2023-07-25 DIAGNOSIS — I10 ESSENTIAL HYPERTENSION: ICD-10-CM

## 2023-07-25 PROCEDURE — 99495 TRANSJ CARE MGMT MOD F2F 14D: CPT | Performed by: PHYSICIAN ASSISTANT

## 2023-07-25 NOTE — TELEPHONE ENCOUNTER
Spoke with patient regarding Dr Spencer Potts response. We will see her in two days for her appointment.

## 2023-07-25 NOTE — PATIENT INSTRUCTIONS
Problem List Items Addressed This Visit          Cardiovascular and Mediastinum    Essential hypertension     Patient with recent admission for uncontrolled rate of atrial fibrillation new onset Norvasc was stopped Lopressor Cardizem was started patient's blood pressure is very good today and she has follow-up with cardiology on the 27th. Paroxysmal atrial fibrillation (HCC)     Status post recent admission and started on Xarelto 20 mg daily with breakfast.  She was also switched off of Zocor and started on Lopressor and Cardizem. She does have cardiology follow-up already scheduled for 27 July. Heart rate is controlled today at 80. Blood pressure stable at 112/60.           Other Visit Diagnoses       Encounter for screening mammogram for malignant neoplasm of breast    -  Primary    Relevant Orders    Mammo screening bilateral w 3d & cad

## 2023-07-25 NOTE — PROGRESS NOTES
Assessment & Plan     1. Encounter for screening mammogram for malignant neoplasm of breast  -     Mammo screening bilateral w 3d & cad; Future; Expected date: 07/25/2023    2. Essential hypertension  Assessment & Plan:  Patient with recent admission for uncontrolled rate of atrial fibrillation new onset Norvasc was stopped Lopressor Cardizem was started patient's blood pressure is very good today and she has follow-up with cardiology on the 27th.      3. Paroxysmal atrial fibrillation ([address])  Assessment & Plan:  Status post recent admission and started on Xarelto 20 mg daily with breakfast.  She was also switched off of Zocor and started on Lopressor and Cardizem. She does have cardiology follow-up already scheduled for 27 July. Heart rate is controlled today at 80. Blood pressure stable at 112/60. Subjective     Transitional Care Management Review:   Gracie Leal is a 70 y.o. female here for TCM follow up. During the TCM phone call patient stated:  TCM Call     Date and time call was made  7/18/2023  7:47 PM    Hospital care reviewed  Records reviewed    [place]  [facility]    Date of Admission  07/12/23    Date of discharge  07/14/23    Diagnosis  Atrial fibrillation with rapid ventricular response    Disposition  Home      TCM Call     I have advised the patient to call PCP with any new or worsening symptoms  Mariangel WATTA        Pt. is here for SAUNDRA  Follow up to an admission from 7/12-7/14. Patient is under the care of of cardiology and she was at her appointment for her echocardiogram and was found to be in uncontrolled atrial fibrillation with a rate between 140 and 150 and was sent to the emergency room. She was actually seen by her own cardiologist Dr. Merlin Desai was admitted for 2 days with work-up actually converted over with the initiation of the diltiazem drip. She was then sent home on diltiazem daily Lopressor twice daily and Xarelto daily.   She states that she has not noticed that feeling of fluttering in her chest again since she was in the hospital but she overall does not feel well or feel herself and she is having a difficult time sleeping. She states every time she lays down to go to sleep she feels very tight in her chest states that all during the day when she is trying to be active which she always is she feels like somebody or something is pulling her down her arms and legs. She did call cardiology and they wanted to not implement any changes until they saw her and ran a new cardiogram on her at the appointment upcoming this Thursday. Patient does have a home blood pressure cuff and she states it is running very low in the 90s over sometimes 40s. Today she had car trouble on her way here to the office to her blood pressure is actually in a more normal range at 112/60. Review of Systems   Constitutional: Negative. HENT: Negative. Eyes: Negative. Respiratory: Negative. Cardiovascular: Negative. Gastrointestinal: Negative. Endocrine: Negative. Genitourinary: Negative. Musculoskeletal: Negative. Skin: Negative. Allergic/Immunologic: Negative. Neurological: Negative. Hematological: Negative. Psychiatric/Behavioral: Negative. Objective     /60   Pulse 80   Ht 5' 4" (1.626 m)   Wt 57.6 kg (127 lb)   BMI 21.80 kg/m²      Physical Exam  Vitals and nursing note reviewed. Constitutional:       Appearance: Normal appearance. She is well-developed. HENT:      Head: Normocephalic and atraumatic. Eyes:      General: Lids are normal.      Conjunctiva/sclera: Conjunctivae normal.      Pupils: Pupils are equal, round, and reactive to light. Cardiovascular:      Rate and Rhythm: Normal rate and regular rhythm. Heart sounds: No murmur heard. Pulmonary:      Effort: Pulmonary effort is normal.      Breath sounds: Normal breath sounds. Skin:     General: Skin is warm and dry. Neurological:      General: No focal deficit present. Mental Status: She is alert. Coordination: Coordination is intact. Psychiatric:         Mood and Affect: Mood normal.         Behavior: Behavior normal. Behavior is cooperative. Thought Content:  Thought content normal.         Judgment: Judgment normal.       Medications have been reviewed by provider in current encounter    Onur Nix PA-C

## 2023-07-25 NOTE — ASSESSMENT & PLAN NOTE
Status post recent admission and started on Xarelto 20 mg daily with breakfast.  She was also switched off of Zocor and started on Lopressor and Cardizem. She does have cardiology follow-up already scheduled for 27 July. Heart rate is controlled today at 80. Blood pressure stable at 112/60.

## 2023-07-25 NOTE — ASSESSMENT & PLAN NOTE
Patient with recent admission for uncontrolled rate of atrial fibrillation new onset Norvasc was stopped Lopressor Cardizem was started patient's blood pressure is very good today and she has follow-up with cardiology on the 27th.

## 2023-07-27 ENCOUNTER — OFFICE VISIT (OUTPATIENT)
Dept: CARDIOLOGY CLINIC | Facility: CLINIC | Age: 78
End: 2023-07-27
Payer: MEDICARE

## 2023-07-27 VITALS
HEIGHT: 64 IN | WEIGHT: 126.6 LBS | SYSTOLIC BLOOD PRESSURE: 144 MMHG | DIASTOLIC BLOOD PRESSURE: 54 MMHG | HEART RATE: 65 BPM | BODY MASS INDEX: 21.61 KG/M2

## 2023-07-27 DIAGNOSIS — I48.91 ATRIAL FIBRILLATION WITH RAPID VENTRICULAR RESPONSE (HCC): ICD-10-CM

## 2023-07-27 DIAGNOSIS — I20.9 ANGINA PECTORIS (HCC): ICD-10-CM

## 2023-07-27 DIAGNOSIS — I10 ESSENTIAL HYPERTENSION: ICD-10-CM

## 2023-07-27 DIAGNOSIS — I34.0 NONRHEUMATIC MITRAL VALVE REGURGITATION: ICD-10-CM

## 2023-07-27 DIAGNOSIS — I48.0 PAROXYSMAL ATRIAL FIBRILLATION (HCC): Primary | ICD-10-CM

## 2023-07-27 DIAGNOSIS — E78.00 HYPERCHOLESTEROLEMIA: ICD-10-CM

## 2023-07-27 DIAGNOSIS — I25.10 NONOBSTRUCTIVE ATHEROSCLEROSIS OF CORONARY ARTERY: ICD-10-CM

## 2023-07-27 DIAGNOSIS — I35.8 AORTIC VALVE SCLEROSIS: ICD-10-CM

## 2023-07-27 DIAGNOSIS — I48.91 NEW ONSET ATRIAL FIBRILLATION (HCC): ICD-10-CM

## 2023-07-27 PROCEDURE — 93000 ELECTROCARDIOGRAM COMPLETE: CPT | Performed by: INTERNAL MEDICINE

## 2023-07-27 PROCEDURE — 99214 OFFICE O/P EST MOD 30 MIN: CPT | Performed by: INTERNAL MEDICINE

## 2023-07-27 RX ORDER — BISOPROLOL FUMARATE 5 MG/1
5 TABLET, FILM COATED ORAL DAILY
Qty: 30 TABLET | Refills: 5 | Status: SHIPPED | OUTPATIENT
Start: 2023-07-27

## 2023-07-27 RX ORDER — ATORVASTATIN CALCIUM 20 MG/1
20 TABLET, FILM COATED ORAL DAILY
Qty: 90 TABLET | Refills: 3 | Status: SHIPPED | OUTPATIENT
Start: 2023-07-27

## 2023-07-27 RX ORDER — DILTIAZEM HYDROCHLORIDE 240 MG/1
240 CAPSULE, COATED, EXTENDED RELEASE ORAL DAILY
Qty: 30 CAPSULE | Refills: 5 | Status: SHIPPED | OUTPATIENT
Start: 2023-07-27

## 2023-07-27 RX ORDER — LOSARTAN POTASSIUM 25 MG/1
25 TABLET ORAL
Qty: 30 TABLET | Refills: 5 | Status: SHIPPED | OUTPATIENT
Start: 2023-07-27

## 2023-07-27 NOTE — PROGRESS NOTES
[facility]  [address] [address] [facility]  Phone#  [phone]   Fax#  [phone]  *-*-*-*-*-*-*-*-*-*-*-*-*-*-*-*-*-*-*-*-*-*-*-*-*-*-*-*-*-*-*-*-*-*-*-*-*-*-*-*-*-*-*-*-*-*-*-*-*-*-*-*-*-*                                   Cardiology Follow Up      ENCOUNTER DATE: 23 9:21 AM  PATIENT NAME: Aylin Moise   : 1945    MRN: [id number]  AGE:78 y.o. SEX: female  [facility] MD Melissa     PRIMARY CARE PHYSICIAN: Chandler Donahue PA-C    ACTIVE DIAGNOSIS THIS VISIT  1. Paroxysmal atrial fibrillation (HCC)  POCT ECG      2. Nonobstructive atherosclerosis of coronary artery  atorvastatin (LIPITOR) 20 mg tablet      3. Aortic valve sclerosis        4. Angina pectoris (HCC)  atorvastatin (LIPITOR) 20 mg tablet      5.  mitral valve prolapse with moderate regurgitation  losartan (COZAAR) 25 mg tablet      6. Hypercholesterolemia        7. Essential hypertension        8. New onset atrial fibrillation (HCC)  rivaroxaban (Xarelto) 20 mg tablet    diltiazem (CARDIZEM CD) 240 mg 24 hr capsule      9.  Atrial fibrillation with rapid ventricular response (HCC)  bisoprolol (ZEBETA) 5 mg tablet    rivaroxaban (Xarelto) 20 mg tablet    diltiazem (CARDIZEM CD) 240 mg 24 hr capsule        ACTIVE PROBLEM LIST  Patient Active Problem List   Diagnosis   • Generalized anxiety disorder   • Diverticulosis   • GERD without esophagitis   • Hiatal hernia   • Hypercholesterolemia   • Essential hypertension   • Irritable bowel syndrome   • Malignant melanoma of skin (HCC)   • Age-related osteoporosis without current pathological fracture   • Squamous cell carcinoma of skin   • Splenic artery aneurysm (HCC)   • Other shoulder lesions, unspecified shoulder   • Other specified disorders of rotator cuff syndrome of shoulder and allied disorders   • Healthcare maintenance   • Hair loss   •  mitral valve prolapse with moderate regurgitation   • Angina pectoris (HCC)   • Nonobstructive atherosclerosis of coronary artery   • Paroxysmal atrial fibrillation [facility])   • Aortic valve sclerosis       CARDIOLOGY SPECIALTY COMMENTS  Patient was 1st seen on 05/27/2021. Patient is a is a 79-year-old female who developed symptoms of chest tightness radiating into her neck and jaw with shortness of breath on physical exertion such as going up a flight of stairs.  The discomfort would melba with rest. Ayush Pinto primary care physician ordered a stress echocardiogram which was nondiagnostic and equivocal for myocardial ischemia.  Mild mitral regurgitation was noted on the echocardiogram.  Patient states that since the stress test, her exertional chest tightness and shortness of breath has been much less severe.  Patient has no family history of coronary artery disease.  A sister has a murmur. Alona Lo is a lifetime nonsmoker. 06/23/2021 nuclear stress test: Resting hypertension with exaggerated hypertensive response to exercise negative treadmill stress test for angina pectoris but with abnormal ST depression with exercise. Exercise induced ventricular ectopy. Normal LV systolic function, EF 20%. Normal tomographic perfusion series. 06/01/2021 echocardiogram:  Normal LV function EF 60%, mild concentric LVH. Grade 1 diastolic dysfunction. Mild-to-moderate left atrial enlargement. Moderate mitral regurgitation. 02/24/2022 cardiac catheterization: Ostial circumflex lesion 50% stenosis. 7/12-7/14/2023 [facility] new onset atrial fibrillation with RVR    7/12/2023 echocardiogram: Normal left ventricular systolic function, EF 62% atrial fibrillation with diastolic dysfunction. Sigmoid shaped septum. Severe LA enlargement. Mild RA dilated. Aortic sclerosis. Valve posterior leaflet prolapse with moderate regurgitation. Mild to moderate tricuspid regurgitation. PASP 59 mmHg. INTERVAL HISTORY:        Patient returns after being hospitalized with atrial fibrillation with RVR.   She has done reasonably well since discharge. She does not have the energy she used to have. The metoprolol particularly gives her a lot of fatigue. She has had 2 episodes of PND. The one episode occurred after she had had popcorn and although it was low-salt popcorn it was not without salt. All her problems relate back to a mitral valve prolapse with moderately severe mitral regurgitation and pulmonary hypertension, PASP 59 mmHg. Her blood pressure was elevated today.   However at the PCPs office it was only 112/60 which is similar to what she gets at home    DISCUSSION/PLAN:          · Stop metoprolol  · Begin bisoprolol 5 mg daily  · Begin losartan 25 mg at bedtime  · All her prescriptions renewed  · Return in 2 months  · EKG on return    Lab Studies:    Lab Results   Component Value Date    CHOLESTEROL 124 03/23/2023    CHOLESTEROL 129 09/20/2022    CHOLESTEROL 122 06/14/2022     Lab Results   Component Value Date    TRIG 61 03/23/2023    TRIG 62 09/20/2022    TRIG 61 06/14/2022     Lab Results   Component Value Date    HDL 49 (L) 03/23/2023    HDL 53 09/20/2022    HDL 52 06/14/2022     Lab Results   Component Value Date    LDLCALC 63 03/23/2023    LDLCALC 64 09/20/2022    LDLCALC 58 06/14/2022       Lab Results   Component Value Date    LDLDIRECT 112 05/07/2014         Lab Results   Component Value Date    EGFR 66 07/20/2023    EGFR 71 07/14/2023    EGFR 71 07/12/2023    SODIUM 140 07/20/2023    SODIUM 139 07/14/2023    SODIUM 137 07/12/2023    K 4.2 07/20/2023    K 3.9 07/14/2023    K 4.2 07/12/2023     07/20/2023     (H) 07/14/2023     07/12/2023    CO2 27 07/20/2023    CO2 25 07/14/2023    CO2 25 07/12/2023    ANIONGAP 5 05/07/2014    BUN 22 07/20/2023    BUN 22 07/14/2023    BUN 25 07/12/2023    CREATININE 0.85 07/20/2023    CREATININE 0.80 07/14/2023    CREATININE 0.80 07/12/2023     Lab Results   Component Value Date    WBC 6.71 07/20/2023    WBC 8.03 07/14/2023    WBC 9.62 07/12/2023    HGB 12.9 07/20/2023 HGB 12.8 07/14/2023    HGB 14.4 07/12/2023    HCT 39.8 07/20/2023    HCT 39.5 07/14/2023    HCT 44.2 07/12/2023    MCV 92 07/20/2023    MCV 92 07/14/2023    MCV 91 07/12/2023    MCH 29.9 07/20/2023    MCH 29.8 07/14/2023    MCH 29.6 07/12/2023    MCHC 32.4 07/20/2023    MCHC 32.4 07/14/2023    MCHC 32.6 07/12/2023     07/20/2023     07/14/2023     07/12/2023      Lab Results   Component Value Date    GLUCOSE 98 05/07/2014    CALCIUM 9.3 07/20/2023    CALCIUM 9.1 07/14/2023    CALCIUM 9.8 07/12/2023    AST 26 07/12/2023    AST 27 03/23/2023    AST 17 09/20/2022    ALT 33 07/12/2023    ALT 49 03/23/2023    ALT 34 09/20/2022    ALKPHOS 88 07/12/2023    ALKPHOS 82 03/23/2023    ALKPHOS 87 09/20/2022    PROT 7.4 05/07/2014    BILITOT 0.37 05/07/2014     Results for orders placed or performed in visit on 07/27/23   POCT ECG    Narrative    Normal sinus rhythm at a rate of 65 bpm.  Normal EKG.          Current Outpatient Medications:   •  ALPRAZolam (XANAX) 0.25 mg tablet, Take 1 tablet (0.25 mg total) by mouth daily as needed for anxiety, Disp: 30 tablet, Rfl: 0  •  atorvastatin (LIPITOR) 20 mg tablet, Take 1 tablet (20 mg total) by mouth daily, Disp: 90 tablet, Rfl: 3  •  b complex vitamins capsule, Take 1 capsule by mouth daily, Disp: , Rfl:   •  BIOTIN PO, Take by mouth, Disp: , Rfl:   •  bisoprolol (ZEBETA) 5 mg tablet, Take 1 tablet (5 mg total) by mouth daily, Disp: 30 tablet, Rfl: 5  •  calcium carbonate (OS-BRYCE) 600 MG tablet, Take 600 mg by mouth 2 (two) times a day with meals, Disp: , Rfl:   •  Cholecalciferol (VITAMIN D3) 1000 units CAPS, Take 2 capsules by mouth daily, Disp: , Rfl:   •  diltiazem (CARDIZEM CD) 240 mg 24 hr capsule, Take 1 capsule (240 mg total) by mouth daily, Disp: 30 capsule, Rfl: 5  •  Lactobacillus Rhamnosus, GG, (CULTURELLE PO), Take by mouth daily, Disp: , Rfl:   •  losartan (COZAAR) 25 mg tablet, Take 1 tablet (25 mg total) by mouth daily at bedtime, Disp: 30 tablet, Rfl: 5  •  rivaroxaban (Xarelto) 20 mg tablet, Take 1 tablet (20 mg total) by mouth daily with breakfast, Disp: 30 tablet, Rfl: 5  •  Vitamin Mixture (G[place]-C) 500-60 MG TABS, Take by mouth, Disp: , Rfl:   Allergies   Allergen Reactions   • Macrobid [Nitrofurantoin] GI Intolerance   • Penicillins Other (See Comments)     Childhood. Per pt, unsure of reaction   • Protonix [Pantoprazole] Nausea Only   • Wound Dressing Adhesive Other (See Comments)     bandaid if on for more than 24 hours       Past Medical History:   Diagnosis Date   • Anxiety    • Diverticulosis    • GERD (gastroesophageal reflux disease)    • Hiatal hernia    • Hypertension    • Melanoma ([address])    • Osteoporosis      Social History     Socioeconomic History   • Marital status: /Civil Union     Spouse name: Not on file   • Number of children: Not on file   • Years of education: Not on file   • Highest education level: Not on file   Occupational History   • Occupation: retired - [employer]    Tobacco Use   • Smoking status: Never   • Smokeless tobacco: Never   • Tobacco comments:     No secondhand smoke exposure    Vaping Use   • Vaping Use: Never used   Substance and Sexual Activity   • Alcohol use: Yes     Comment: very rare socially   • Drug use: Not on file   • Sexual activity: Not on file   Other Topics Concern   • Not on file   Social History Narrative   • Not on file     Social Determinants of Health     Financial Resource Strain: Low Risk  (4/10/2023)    Overall Financial Resource Strain (CARDIA)    • Difficulty of Paying Living Expenses: Not hard at all   Food Insecurity: Not on file   Transportation Needs: No Transportation Needs (4/10/2023)    PRAPARE - Transportation    • Lack of Transportation (Medical): No    • Lack of Transportation (Non-Medical):  No   Physical Activity: Not on file   Stress: Not on file   Social Connections: Not on file   Intimate Partner Violence: Not on file   Housing Stability: Not on file      Family History   Problem Relation Age of Onset   • Uterine cancer Mother    • Prostate cancer Father    • Osteoporosis Sister      Past Surgical History:   Procedure Laterality Date   • CARDIAC CATHETERIZATION N/A 2/24/2022    Procedure: Cardiac catheterization;  Surgeon: Dimitri Peacock MD;  Location: [facility]ARD[facility]; Service: Cardiology   • CARDIAC CATHETERIZATION N/A 2/24/2022    Procedure: Cardiac Coronary Angiogram;  Surgeon: Dimitri Peacock MD;  Location: [facility]; Service: Cardiology   • EGD AND COLONOSCOPY  05/14/2014    NORMAL/HP       PREVIOUS WEIGHTS:   Wt Readings from Last 10 Encounters:   07/27/23 57.4 kg (126 lb 9.6 oz)   07/25/23 57.6 kg (127 lb)   07/12/23 59 kg (130 lb)   07/12/23 59 kg (130 lb)   04/10/23 59 kg (130 lb)   02/01/23 58.5 kg (129 lb)   10/03/22 58.2 kg (128 lb 4 oz)   06/20/22 58.2 kg (128 lb 3.2 oz)   03/25/22 59.2 kg (130 lb 9.6 oz)   03/17/22 59.9 kg (132 lb)        Review of Systems:  Review of Systems   Respiratory: Positive for shortness of breath. Negative for cough, choking, chest tightness, wheezing and stridor. Cardiovascular: Negative for chest pain, palpitations and leg swelling. Musculoskeletal: Negative for gait problem. Skin: Negative for rash. Neurological: Negative for dizziness, tremors, syncope, weakness, light-headedness, numbness and headaches. Psychiatric/Behavioral: Negative for agitation and behavioral problems. The patient is not hyperactive. Physical Exam:  /54 (BP Location: Right arm, Patient Position: Sitting, Cuff Size: Adult)   Pulse 65   Ht 5' 4" (1.626 m)   Wt 57.4 kg (126 lb 9.6 oz)   BMI 21.73 kg/m²     Physical Exam  Constitutional:       General: She is not in acute distress. Appearance: She is well-developed. HENT:      Head: Normocephalic and atraumatic. Neck:      Thyroid: No thyromegaly. Vascular: No carotid bruit or JVD. Trachea: No tracheal deviation.    Cardiovascular: Rate and Rhythm: Normal rate and regular rhythm. Pulses: Normal pulses. Heart sounds: Murmur heard. Systolic murmur is present with a grade of 4/6. No friction rub. No gallop. Comments: Holosystolic murmur heard throughout the chest but loudest at the apex  Pulmonary:      Effort: Pulmonary effort is normal. No respiratory distress. Breath sounds: Normal breath sounds. No wheezing, rhonchi or rales. Chest:      Chest wall: No tenderness. Abdominal:      General: Bowel sounds are normal. There is no distension. Palpations: Abdomen is soft. Tenderness: There is no abdominal tenderness. Musculoskeletal:         General: Normal range of motion. Cervical back: Normal range of motion and neck supple. Right lower leg: No edema. Left lower leg: No edema. Skin:     General: Skin is warm and dry. Neurological:      General: No focal deficit present. Mental Status: She is alert and oriented to person, place, and time. Gait: Gait normal.   Psychiatric:         Mood and Affect: Mood normal.         Behavior: Behavior normal.         Thought Content: Thought content normal.         Judgment: Judgment normal.         -======================================================  Imaging:   I have personally reviewed pertinent reports. I spent 30 minutes on the patient's office visit. This time was spent on the day of the visit. I had direct contact with the patient in the office on the day of the visit. Greater than 50% of the total time was spent obtaining a history, examining patient, answering all patient questions, arranging and discussing plan of care with patient as well as directly providing instructions. Additional time then spent on orders and office chart. Portions of the record may have been created with voice recognition software.  Occasional wrong word or "sound a like" substitutions may have occurred due to the inherent limitations of voice recognition software. Read the chart carefully and recognize, using context, where substitutions have occurred.     SIGNATURES:   Hu Srivastava MD

## 2023-08-08 ENCOUNTER — TELEPHONE (OUTPATIENT)
Dept: CARDIOLOGY CLINIC | Facility: CLINIC | Age: 78
End: 2023-08-08

## 2023-08-08 NOTE — TELEPHONE ENCOUNTER
Patient was in the office to see Dr Verenice Fischer on 7/27/23 and patient states she was put on Losartan 25 mgs daily and Bisoprolol 5mgs daily and the medication "is too much for me". Her BP's are low at 104/48, 100/46, 93/36 and she is dizzy and sluggish. It is the same as the Metoprolol made her feel and she cannot do anything like house work, laundry, etc. Please advise.   [phone]

## 2023-08-10 NOTE — TELEPHONE ENCOUNTER
Called patient and gave her Dr Gerda Anglin advisement on cutting the Bisoprolol in half. Patient will do that and report back with results.

## 2023-08-14 ENCOUNTER — TELEPHONE (OUTPATIENT)
Dept: CARDIOLOGY CLINIC | Facility: CLINIC | Age: 78
End: 2023-08-14

## 2023-08-14 NOTE — TELEPHONE ENCOUNTER
PC from patient stating she has had trouble sleeping the past two nights or so. She wakes up wheezing, with phlegm and tightness in her chest. She is fine if she takes a Xanax and relaxes. I wasn't so sure this is cardiac related and asked her to call PCP, for she may need a CXR for diagnosis. If cardiac related she is to call back.

## 2023-08-15 ENCOUNTER — OFFICE VISIT (OUTPATIENT)
Dept: FAMILY MEDICINE CLINIC | Facility: CLINIC | Age: 78
End: 2023-08-15
Payer: MEDICARE

## 2023-08-15 VITALS
OXYGEN SATURATION: 98 % | BODY MASS INDEX: 22.2 KG/M2 | WEIGHT: 130 LBS | SYSTOLIC BLOOD PRESSURE: 142 MMHG | RESPIRATION RATE: 16 BRPM | HEIGHT: 64 IN | HEART RATE: 69 BPM | DIASTOLIC BLOOD PRESSURE: 60 MMHG

## 2023-08-15 DIAGNOSIS — I48.0 PAROXYSMAL ATRIAL FIBRILLATION (HCC): ICD-10-CM

## 2023-08-15 DIAGNOSIS — J30.9 ALLERGIC RHINITIS, UNSPECIFIED SEASONALITY, UNSPECIFIED TRIGGER: ICD-10-CM

## 2023-08-15 DIAGNOSIS — K21.9 GERD WITHOUT ESOPHAGITIS: Primary | ICD-10-CM

## 2023-08-15 PROCEDURE — 99214 OFFICE O/P EST MOD 30 MIN: CPT | Performed by: PHYSICIAN ASSISTANT

## 2023-08-15 RX ORDER — MONTELUKAST SODIUM 10 MG/1
10 TABLET ORAL
Qty: 90 TABLET | OUTPATIENT
Start: 2023-08-15

## 2023-08-15 RX ORDER — OMEPRAZOLE 20 MG/1
20 CAPSULE, DELAYED RELEASE ORAL DAILY
Qty: 30 CAPSULE | Refills: 0 | Status: SHIPPED | COMMUNITY
Start: 2023-08-15

## 2023-08-15 RX ORDER — MONTELUKAST SODIUM 10 MG/1
10 TABLET ORAL
Qty: 30 TABLET | Refills: 5 | Status: SHIPPED | OUTPATIENT
Start: 2023-08-15

## 2023-08-15 NOTE — PROGRESS NOTES
Name: Jana Saldana      : 1945      MRN: [id number]  Encounter Provider: Julia Benítez PA-C  Encounter Date: 8/15/2023   Encounter department: [facility] PRIMARY CARE    Assessment & Plan     1. GERD without esophagitis  Assessment & Plan:  Consider changing pts PPI in the future if her wheezing extra mucus continues despite singulair. Pt on OTC prilosec 20 mg daily. Orders:  -     omeprazole (PriLOSEC) 20 mg delayed release capsule; Take 1 capsule (20 mg total) by mouth daily    2. Allergic rhinitis, unspecified seasonality, unspecified trigger  Assessment & Plan:  Pt. has been having increase in PND and a wheeze like chest congestion only when she lye's down to go to bed for the past 4 months. CXR was normal. Trial singulair 10 mg once daily for one month. Taking a xanax lets her sleep by shutting off her anxiety about the situation. Orders:  -     montelukast (SINGULAIR) 10 mg tablet; Take 1 tablet (10 mg total) by mouth daily at bedtime    3. Paroxysmal atrial fibrillation [facility])  Assessment & Plan:  Follows with cardio regularly and current symptoms not accompanied by palpitations. Subjective      Pt is here today with c/o having an increase in clear PND and wheezing that wakes her up in the middle of the night for the past 4 months. This gets her anxious and so then she has to take a xanax in order to fall back asleep. She called cardio thinking it was her heart. She does experience a lot of PND and clears her throat often during the day. NO hx asthma. During the day never any SOB or chest discomfort or wheezing with cough. She states night cough is very deep bronchial sounding. She does take OTC prilosec daily for years for GERD which she says controls these symptoms well. Review of Systems   Constitutional: Negative. HENT: Positive for postnasal drip and rhinorrhea. Eyes: Negative. Respiratory: Positive for cough. Cardiovascular: Negative. Gastrointestinal: Negative. Endocrine: Negative. Genitourinary: Negative. Musculoskeletal: Negative. Skin: Negative. Allergic/Immunologic: Negative. Neurological: Negative. Hematological: Negative. Psychiatric/Behavioral: Negative. Current Outpatient Medications on File Prior to Visit   Medication Sig   • ALPRAZolam (XANAX) 0.25 mg tablet Take 1 tablet (0.25 mg total) by mouth daily as needed for anxiety   • atorvastatin (LIPITOR) 20 mg tablet Take 1 tablet (20 mg total) by mouth daily   • b complex vitamins capsule Take 1 capsule by mouth daily   • BIOTIN PO Take by mouth   • bisoprolol (ZEBETA) 5 mg tablet Take 1 tablet (5 mg total) by mouth daily   • calcium carbonate (OS-BRYCE) 600 MG tablet Take 600 mg by mouth 2 (two) times a day with meals   • Cholecalciferol (VITAMIN D3) 1000 units CAPS Take 2 capsules by mouth daily   • diltiazem (CARDIZEM CD) 240 mg 24 hr capsule Take 1 capsule (240 mg total) by mouth daily   • Lactobacillus Rhamnosus, GG, (CULTURELLE PO) Take by mouth daily   • losartan (COZAAR) 25 mg tablet Take 1 tablet (25 mg total) by mouth daily at bedtime   • rivaroxaban (Xarelto) 20 mg tablet Take 1 tablet (20 mg total) by mouth daily with breakfast   • Vitamin Mixture (G[place]-C) 500-60 MG TABS Take by mouth       Objective     /60 (BP Location: Left arm, Patient Position: Sitting, Cuff Size: Standard)   Pulse 69   Resp 16   Ht 5' 4" (1.626 m)   Wt 59 kg (130 lb)   SpO2 98%   BMI 22.31 kg/m²     Physical Exam  Vitals and nursing note reviewed. Constitutional:       General: She is not in acute distress. Appearance: She is well-developed. She is not diaphoretic. HENT:      Head: Normocephalic and atraumatic. Eyes:      General:         Right eye: No discharge. Left eye: No discharge. Conjunctiva/sclera: Conjunctivae normal.   Neck:      Vascular: No carotid bruit. Cardiovascular:      Rate and Rhythm: Normal rate and regular rhythm. Heart sounds: Normal heart sounds. No murmur heard. No friction rub. No gallop. Pulmonary:      Effort: Pulmonary effort is normal. No respiratory distress. Breath sounds: Normal breath sounds. No wheezing or rales. Musculoskeletal:      Cervical back: Neck supple. Skin:     General: Skin is warm and dry. Neurological:      Mental Status: She is alert and oriented to person, place, and time.    Psychiatric:         Judgment: Judgment normal.       Onur Nix PA-C

## 2023-08-15 NOTE — ASSESSMENT & PLAN NOTE
Consider changing pts PPI in the future if her wheezing extra mucus continues despite singulair. Pt on OTC prilosec 20 mg daily.

## 2023-08-15 NOTE — ASSESSMENT & PLAN NOTE
Pt. has been having increase in PND and a wheeze like chest congestion only when she lye's down to go to bed for the past 4 months. CXR was normal. Trial singulair 10 mg once daily for one month. Taking a xanax lets her sleep by shutting off her anxiety about the situation.

## 2023-08-31 DIAGNOSIS — I48.91 ATRIAL FIBRILLATION WITH RAPID VENTRICULAR RESPONSE (HCC): ICD-10-CM

## 2023-08-31 DIAGNOSIS — I34.0 NONRHEUMATIC MITRAL VALVE REGURGITATION: ICD-10-CM

## 2023-08-31 RX ORDER — BISOPROLOL FUMARATE 5 MG/1
5 TABLET, FILM COATED ORAL DAILY
Qty: 90 TABLET | Refills: 1 | Status: SHIPPED | OUTPATIENT
Start: 2023-08-31

## 2023-08-31 RX ORDER — LOSARTAN POTASSIUM 25 MG/1
25 TABLET ORAL
Qty: 90 TABLET | Refills: 1 | Status: SHIPPED | OUTPATIENT
Start: 2023-08-31

## 2023-10-03 ENCOUNTER — APPOINTMENT (OUTPATIENT)
Dept: LAB | Facility: CLINIC | Age: 78
End: 2023-10-03
Payer: MEDICARE

## 2023-10-03 DIAGNOSIS — E78.00 HYPERCHOLESTEROLEMIA: ICD-10-CM

## 2023-10-03 DIAGNOSIS — M81.0 AGE-RELATED OSTEOPOROSIS WITHOUT CURRENT PATHOLOGICAL FRACTURE: ICD-10-CM

## 2023-10-03 DIAGNOSIS — I10 ESSENTIAL HYPERTENSION: ICD-10-CM

## 2023-10-03 LAB
25(OH)D3 SERPL-MCNC: 60.3 NG/ML (ref 30–100)
ALBUMIN SERPL BCP-MCNC: 4.2 G/DL (ref 3.5–5)
ALP SERPL-CCNC: 76 U/L (ref 34–104)
ALT SERPL W P-5'-P-CCNC: 28 U/L (ref 7–52)
ANION GAP SERPL CALCULATED.3IONS-SCNC: 5 MMOL/L
AST SERPL W P-5'-P-CCNC: 18 U/L (ref 13–39)
BASOPHILS # BLD AUTO: 0.05 THOUSANDS/ÂΜL (ref 0–0.1)
BASOPHILS NFR BLD AUTO: 1 % (ref 0–1)
BILIRUB SERPL-MCNC: 0.54 MG/DL (ref 0.2–1)
BUN SERPL-MCNC: 26 MG/DL (ref 5–25)
CALCIUM SERPL-MCNC: 9.4 MG/DL (ref 8.4–10.2)
CHLORIDE SERPL-SCNC: 106 MMOL/L (ref 96–108)
CHOLEST SERPL-MCNC: 132 MG/DL
CO2 SERPL-SCNC: 28 MMOL/L (ref 21–32)
CREAT SERPL-MCNC: 0.82 MG/DL (ref 0.6–1.3)
EOSINOPHIL # BLD AUTO: 0.14 THOUSAND/ÂΜL (ref 0–0.61)
EOSINOPHIL NFR BLD AUTO: 2 % (ref 0–6)
ERYTHROCYTE [DISTWIDTH] IN BLOOD BY AUTOMATED COUNT: 14.2 % (ref 11.6–15.1)
GFR SERPL CREATININE-BSD FRML MDRD: 69 ML/MIN/1.73SQ M
GLUCOSE P FAST SERPL-MCNC: 96 MG/DL (ref 65–99)
HCT VFR BLD AUTO: 42.3 % (ref 34.8–46.1)
HDLC SERPL-MCNC: 48 MG/DL
HGB BLD-MCNC: 14 G/DL (ref 11.5–15.4)
IMM GRANULOCYTES # BLD AUTO: 0.01 THOUSAND/UL (ref 0–0.2)
IMM GRANULOCYTES NFR BLD AUTO: 0 % (ref 0–2)
LDLC SERPL CALC-MCNC: 68 MG/DL (ref 0–100)
LYMPHOCYTES # BLD AUTO: 2.59 THOUSANDS/ÂΜL (ref 0.6–4.47)
LYMPHOCYTES NFR BLD AUTO: 36 % (ref 14–44)
MCH RBC QN AUTO: 30.2 PG (ref 26.8–34.3)
MCHC RBC AUTO-ENTMCNC: 33.1 G/DL (ref 31.4–37.4)
MCV RBC AUTO: 91 FL (ref 82–98)
MONOCYTES # BLD AUTO: 0.51 THOUSAND/ÂΜL (ref 0.17–1.22)
MONOCYTES NFR BLD AUTO: 7 % (ref 4–12)
NEUTROPHILS # BLD AUTO: 3.86 THOUSANDS/ÂΜL (ref 1.85–7.62)
NEUTS SEG NFR BLD AUTO: 54 % (ref 43–75)
NRBC BLD AUTO-RTO: 0 /100 WBCS
PLATELET # BLD AUTO: 269 THOUSANDS/UL (ref 149–390)
PMV BLD AUTO: 10.1 FL (ref 8.9–12.7)
POTASSIUM SERPL-SCNC: 4.2 MMOL/L (ref 3.5–5.3)
PROT SERPL-MCNC: 7.2 G/DL (ref 6.4–8.4)
RBC # BLD AUTO: 4.63 MILLION/UL (ref 3.81–5.12)
SODIUM SERPL-SCNC: 139 MMOL/L (ref 135–147)
TRIGL SERPL-MCNC: 78 MG/DL
WBC # BLD AUTO: 7.16 THOUSAND/UL (ref 4.31–10.16)

## 2023-10-03 PROCEDURE — 80053 COMPREHEN METABOLIC PANEL: CPT

## 2023-10-03 PROCEDURE — 82306 VITAMIN D 25 HYDROXY: CPT

## 2023-10-03 PROCEDURE — 85025 COMPLETE CBC W/AUTO DIFF WBC: CPT

## 2023-10-03 PROCEDURE — 80061 LIPID PANEL: CPT

## 2023-10-03 PROCEDURE — 36415 COLL VENOUS BLD VENIPUNCTURE: CPT

## 2023-10-05 ENCOUNTER — OFFICE VISIT (OUTPATIENT)
Dept: CARDIOLOGY CLINIC | Facility: CLINIC | Age: 78
End: 2023-10-05
Payer: MEDICARE

## 2023-10-05 VITALS
HEIGHT: 64 IN | SYSTOLIC BLOOD PRESSURE: 130 MMHG | HEART RATE: 58 BPM | DIASTOLIC BLOOD PRESSURE: 52 MMHG | WEIGHT: 126 LBS | BODY MASS INDEX: 21.51 KG/M2

## 2023-10-05 DIAGNOSIS — E78.00 HYPERCHOLESTEROLEMIA: ICD-10-CM

## 2023-10-05 DIAGNOSIS — I35.8 AORTIC VALVE SCLEROSIS: ICD-10-CM

## 2023-10-05 DIAGNOSIS — I34.0 NONRHEUMATIC MITRAL VALVE REGURGITATION: ICD-10-CM

## 2023-10-05 DIAGNOSIS — I25.10 NONOBSTRUCTIVE ATHEROSCLEROSIS OF CORONARY ARTERY: ICD-10-CM

## 2023-10-05 DIAGNOSIS — I10 ESSENTIAL HYPERTENSION: ICD-10-CM

## 2023-10-05 DIAGNOSIS — I48.0 PAROXYSMAL ATRIAL FIBRILLATION (HCC): Primary | ICD-10-CM

## 2023-10-05 PROCEDURE — 99215 OFFICE O/P EST HI 40 MIN: CPT | Performed by: INTERNAL MEDICINE

## 2023-10-05 PROCEDURE — 93000 ELECTROCARDIOGRAM COMPLETE: CPT | Performed by: INTERNAL MEDICINE

## 2023-10-05 NOTE — PROGRESS NOTES
[facility]  [address] [facility] [address] [facility] [address] [facility]  Phone#  [phone]   Fax#  [phone]  *-*-*-*-*-*-*-*-*-*-*-*-*-*-*-*-*-*-*-*-*-*-*-*-*-*-*-*-*-*-*-*-*-*-*-*-*-*-*-*-*-*-*-*-*-*-*-*-*-*-*-*-*-*                                   Cardiology Follow Up      ENCOUNTER DATE: 10/05/23 6:53 PM  PATIENT NAME: Clarissa Garcia   : 1945    MRN: [id number]  AGE:78 y.o. SEX: female  [address], [place]     PRIMARY CARE PHYSICIAN: Eilene Dancer, PA-C    ACTIVE DIAGNOSIS THIS VISIT  1. Paroxysmal atrial fibrillation (HCC)  POCT ECG      2. Nonobstructive atherosclerosis of coronary artery        3. Hypercholesterolemia        4. Aortic valve sclerosis        5.  mitral valve prolapse with moderate regurgitation        6. Essential hypertension          ACTIVE PROBLEM LIST  Patient Active Problem List   Diagnosis   • Generalized anxiety disorder   • Diverticulosis   • GERD without esophagitis   • Hiatal hernia   • Hypercholesterolemia   • Essential hypertension   • Irritable bowel syndrome   • Malignant melanoma of skin (HCC)   • Age-related osteoporosis without current pathological fracture   • Squamous cell carcinoma of skin   • Splenic artery aneurysm (HCC)   • Other shoulder lesions, unspecified shoulder   • Other specified disorders of rotator cuff syndrome of shoulder and allied disorders   • Healthcare maintenance   • Hair loss   •  mitral valve prolapse with moderate regurgitation   • Angina pectoris (HCC)   • Nonobstructive atherosclerosis of coronary artery   • Paroxysmal atrial fibrillation (HCC)   • Aortic valve sclerosis   • Allergic rhinitis       CARDIOLOGY SPECIALTY COMMENTS  Patient was 1st seen on 2021.  Patient is a is a 82-year-old female who developed symptoms of chest tightness radiating into her neck and jaw with shortness of breath on physical exertion such as going up a flight of stairs.  The discomfort would melba with rest.  Her primary care physician ordered a stress echocardiogram which was nondiagnostic and equivocal for myocardial ischemia.  Mild mitral regurgitation was noted on the echocardiogram.  Patient states that since the stress test, her exertional chest tightness and shortness of breath has been much less severe.  Patient has no family history of coronary artery disease.  A sister has a murmur. Sarah Saucedo is a lifetime nonsmoker. Patient had a melanoma removed 11 years ago and has had no evidence of recurrence. 06/23/2021 nuclear stress test: Resting hypertension with exaggerated hypertensive response to exercise negative treadmill stress test for angina pectoris but with abnormal ST depression with exercise. Exercise induced ventricular ectopy. Normal LV systolic function, EF 96%. Normal tomographic perfusion series. 06/01/2021 echocardiogram:  Normal LV function EF 60%, mild concentric LVH. Grade 1 diastolic dysfunction. Mild-to-moderate left atrial enlargement. Moderate mitral regurgitation. 02/24/2022 cardiac catheterization: Ostial circumflex lesion 50% stenosis. 7/12-7/14/2023 [facility] new onset atrial fibrillation with RVR    7/12/2023 echocardiogram: Normal left ventricular systolic function, EF 87% atrial fibrillation with diastolic dysfunction. Sigmoid shaped septum. Severe LA enlargement. Mild RA dilated. Aortic sclerosis. Valve posterior leaflet prolapse with moderate regurgitation. Mild to moderate tricuspid regurgitation. PASP 59 mmHg. INTERVAL HISTORY:        Patient has mild shortness of breath on exertion but no major cardiac symptoms and would be considered a [place] heart class II. She does have recent new complaints. Bisoprolol causes her legs to feel weak and hurt when she goes upstairs or walks any distance. The bisoprolol that she is getting is generic and the pill shape has changed. It has a very hard coating on it and is extremely difficult to split.     Diltiazem which is generic has changed changed and now she gets an upset stomach and abdominal pain from the diltiazem. He also claims that the losartan causes itching and keeps her awake at night. Will try taking it at supper time. If patient develops hives she should stop it    Discussed that her mitral valve leaks a significant amount. She would need further testing to be done to see if it could be repaired. Discussed with her robotic repair of the mitral valve and alternative locations in which that could be done. Robotic repair is not being done at [facility]. DISCUSSION/PLAN:          · Explained to patient that she should discuss with the pharmacy the symptoms she is having from the change in generic pills. The only thing that I could do would be to write for brand only which would be more expensive. · Patient could try reducing bisoprolol to a half a tablet daily. I could also prescribe bysystolic which comes in a 2-1/2 mg pill but is not generic and may be more expensive. We will check on the price of bysystolic  · Recommend the patient go for walks and get exercise and push herself even if her legs are bothering her. This should increase her blood flow to her legs and may help improve the situation.   · Return in 3 months    Lab Studies:    Lab Results   Component Value Date    CHOLESTEROL 132 10/03/2023    CHOLESTEROL 124 03/23/2023    CHOLESTEROL 129 09/20/2022     Lab Results   Component Value Date    TRIG 78 10/03/2023    TRIG 61 03/23/2023    TRIG 62 09/20/2022     Lab Results   Component Value Date    HDL 48 (L) 10/03/2023    HDL 49 (L) 03/23/2023    HDL 53 09/20/2022     Lab Results   Component Value Date    LDLCALC 68 10/03/2023    LDLCALC 63 03/23/2023    LDLCALC 64 09/20/2022     Lab Results   Component Value Date    LDLDIRECT 112 05/07/2014       Lab Results   Component Value Date    EGFR 69 10/03/2023    EGFR 66 07/20/2023    EGFR 71 07/14/2023    SODIUM 139 10/03/2023    SODIUM 140 07/20/2023    SODIUM 139 07/14/2023    K 4.2 10/03/2023    K 4.2 07/20/2023    K 3.9 07/14/2023     10/03/2023     07/20/2023     (H) 07/14/2023    CO2 28 10/03/2023    CO2 27 07/20/2023    CO2 25 07/14/2023    ANIONGAP 5 05/07/2014    BUN 26 (H) 10/03/2023    BUN 22 07/20/2023    BUN 22 07/14/2023    CREATININE 0.82 10/03/2023    CREATININE 0.85 07/20/2023    CREATININE 0.80 07/14/2023     Lab Results   Component Value Date    WBC 7.16 10/03/2023    WBC 6.71 07/20/2023    WBC 8.03 07/14/2023    HGB 14.0 10/03/2023    HGB 12.9 07/20/2023    HGB 12.8 07/14/2023    HCT 42.3 10/03/2023    HCT 39.8 07/20/2023    HCT 39.5 07/14/2023    MCV 91 10/03/2023    MCV 92 07/20/2023    MCV 92 07/14/2023    MCH 30.2 10/03/2023    MCH 29.9 07/20/2023    MCH 29.8 07/14/2023    MCHC 33.1 10/03/2023    MCHC 32.4 07/20/2023    MCHC 32.4 07/14/2023     10/03/2023     07/20/2023     07/14/2023      Lab Results   Component Value Date    GLUCOSE 98 05/07/2014    CALCIUM 9.4 10/03/2023    CALCIUM 9.3 07/20/2023    CALCIUM 9.1 07/14/2023    AST 18 10/03/2023    AST 26 07/12/2023    AST 27 03/23/2023    ALT 28 10/03/2023    ALT 33 07/12/2023    ALT 49 03/23/2023    ALKPHOS 76 10/03/2023    ALKPHOS 88 07/12/2023    ALKPHOS 82 03/23/2023    PROT 7.4 05/07/2014    BILITOT 0.37 05/07/2014       Results for orders placed or performed in visit on 10/05/23   POCT ECG    Narrative    Sinus bradycardia at a rate of 58 bpm.  Possible left atrial enlargement. Borderline ECG.          Current Outpatient Medications:   •  ALPRAZolam (XANAX) 0.25 mg tablet, Take 1 tablet (0.25 mg total) by mouth daily as needed for anxiety, Disp: 30 tablet, Rfl: 0  •  atorvastatin (LIPITOR) 20 mg tablet, Take 1 tablet (20 mg total) by mouth daily, Disp: 90 tablet, Rfl: 3  •  b complex vitamins capsule, Take 1 capsule by mouth daily, Disp: , Rfl:   •  BIOTIN PO, Take by mouth, Disp: , Rfl:   •  bisoprolol (ZEBETA) 5 mg tablet, Take 1 tablet (5 mg total) by mouth daily, Disp: 90 tablet, Rfl: 1  •  calcium carbonate (OS-BRYCE) 600 MG tablet, Take 600 mg by mouth 2 (two) times a day with meals, Disp: , Rfl:   •  Cholecalciferol (VITAMIN D3) 1000 units CAPS, Take 2 capsules by mouth daily, Disp: , Rfl:   •  diltiazem (CARDIZEM CD) 240 mg 24 hr capsule, Take 1 capsule (240 mg total) by mouth daily, Disp: 30 capsule, Rfl: 5  •  Lactobacillus Rhamnosus, GG, (CULTURELLE PO), Take by mouth daily, Disp: , Rfl:   •  losartan (COZAAR) 25 mg tablet, Take 1 tablet (25 mg total) by mouth daily at bedtime, Disp: 90 tablet, Rfl: 1  •  omeprazole (PriLOSEC) 20 mg delayed release capsule, Take 1 capsule (20 mg total) by mouth daily, Disp: 30 capsule, Rfl: 0  •  rivaroxaban (Xarelto) 20 mg tablet, Take 1 tablet (20 mg total) by mouth daily with breakfast, Disp: 30 tablet, Rfl: 5  •  Vitamin Mixture ([place]-C) 500-60 MG TABS, Take by mouth, Disp: , Rfl:   Allergies   Allergen Reactions   • Macrobid [Nitrofurantoin] GI Intolerance   • Penicillins Other (See Comments)     Childhood.  Per pt, unsure of reaction   • Protonix [Pantoprazole] Nausea Only   • Wound Dressing Adhesive Other (See Comments)     bandaid if on for more than 24 hours       Past Medical History:   Diagnosis Date   • Anxiety    • Diverticulosis    • GERD (gastroesophageal reflux disease)    • Hiatal hernia    • Hypertension    • Melanoma ([address])    • Osteoporosis      Social History     Socioeconomic History   • Marital status: /Civil Union     Spouse name: Not on file   • Number of children: Not on file   • Years of education: Not on file   • Highest education level: Not on file   Occupational History   • Occupation: retired - [employer]    Tobacco Use   • Smoking status: Never   • Smokeless tobacco: Never   • Tobacco comments:     No secondhand smoke exposure    Vaping Use   • Vaping Use: Never used   Substance and Sexual Activity   • Alcohol use: Yes     Comment: very rare socially   • Drug use: Not on file   • Sexual activity: Not on file   Other Topics Concern   • Not on file   Social History Narrative   • Not on file     Social Determinants of Health     Financial Resource Strain: Low Risk  (4/10/2023)    Overall Financial Resource Strain (CARDIA)    • Difficulty of Paying Living Expenses: Not hard at all   Food Insecurity: Not on file   Transportation Needs: No Transportation Needs (4/10/2023)    PRAPARE - Transportation    • Lack of Transportation (Medical): No    • Lack of Transportation (Non-Medical): No   Physical Activity: Not on file   Stress: Not on file   Social Connections: Not on file   Intimate Partner Violence: Not on file   Housing Stability: Not on file      Family History   Problem Relation Age of Onset   • Uterine cancer Mother    • Prostate cancer Father    • Osteoporosis Sister      Past Surgical History:   Procedure Laterality Date   • CARDIAC CATHETERIZATION N/A 2/24/2022    Procedure: Cardiac catheterization;  Surgeon: Armida Halsted, MD;  Location: [facility]; Service: Cardiology   • CARDIAC CATHETERIZATION N/A 2/24/2022    Procedure: Cardiac Coronary Angiogram;  Surgeon: Armida Halsted, MD;  Location: [facility]; Service: Cardiology   • EGD AND COLONOSCOPY  05/14/2014    NORMAL/HP       PREVIOUS WEIGHTS:   Wt Readings from Last 10 Encounters:   10/05/23 57.2 kg (126 lb)   08/15/23 59 kg (130 lb)   07/27/23 57.4 kg (126 lb 9.6 oz)   07/25/23 57.6 kg (127 lb)   07/12/23 59 kg (130 lb)   07/12/23 59 kg (130 lb)   04/10/23 59 kg (130 lb)   02/01/23 58.5 kg (129 lb)   10/03/22 58.2 kg (128 lb 4 oz)   06/20/22 58.2 kg (128 lb 3.2 oz)        Review of Systems:  Review of Systems   Respiratory: Negative for cough, choking, chest tightness, shortness of breath and wheezing. Cardiovascular: Negative for chest pain, palpitations and leg swelling. Musculoskeletal: Negative for gait problem. Skin: Negative for rash.    Neurological: Negative for dizziness, tremors, syncope, weakness, light-headedness, numbness and headaches. Psychiatric/Behavioral: Negative for agitation and behavioral problems. The patient is not hyperactive. Physical Exam:  /52   Pulse 58   Ht 5' 4" (1.626 m)   Wt 57.2 kg (126 lb)   BMI 21.63 kg/m²     Physical Exam  Constitutional:       General: She is not in acute distress. Appearance: She is well-developed. HENT:      Head: Normocephalic and atraumatic. Neck:      Thyroid: No thyromegaly. Vascular: No carotid bruit or JVD. Trachea: No tracheal deviation. Cardiovascular:      Rate and Rhythm: Normal rate and regular rhythm. Pulses: Normal pulses. Heart sounds: Normal heart sounds. No murmur heard. No friction rub. No gallop. Pulmonary:      Effort: Pulmonary effort is normal. No respiratory distress. Breath sounds: Normal breath sounds. No wheezing, rhonchi or rales. Chest:      Chest wall: No tenderness. Abdominal:      General: Bowel sounds are normal. There is no distension. Palpations: Abdomen is soft. Tenderness: There is no abdominal tenderness. Musculoskeletal:         General: Normal range of motion. Cervical back: Normal range of motion and neck supple. Right lower leg: No edema. Left lower leg: No edema. Skin:     General: Skin is warm and dry. Neurological:      General: No focal deficit present. Mental Status: She is alert and oriented to person, place, and time. Gait: Gait normal.   Psychiatric:         Mood and Affect: Mood normal.         Behavior: Behavior normal.         Thought Content: Thought content normal.         Judgment: Judgment normal.         ======================================================  Imaging:   I have personally reviewed pertinent reports. I spent 50 minutes on the patient's office visit. This time was spent on the day of the visit. I had direct contact with the patient in the office on the day of the visit. Greater than 50% of the total time was spent obtaining a history, examining patient, answering all patient questions, arranging and discussing plan of care with patient as well as directly providing instructions. Additional time then spent on orders and office chart. Portions of the record may have been created with voice recognition software. Occasional wrong word or "sound a like" substitutions may have occurred due to the inherent limitations of voice recognition software. Read the chart carefully and recognize, using context, where substitutions have occurred.     SIGNATURES:   Ayaan Gilmore MD

## 2023-10-12 DIAGNOSIS — F41.1 GENERALIZED ANXIETY DISORDER: ICD-10-CM

## 2023-10-12 RX ORDER — ALPRAZOLAM 0.25 MG/1
0.25 TABLET ORAL DAILY PRN
Qty: 30 TABLET | Refills: 0 | Status: SHIPPED | OUTPATIENT
Start: 2023-10-12

## 2023-10-13 ENCOUNTER — TELEPHONE (OUTPATIENT)
Dept: CARDIOLOGY CLINIC | Facility: CLINIC | Age: 78
End: 2023-10-13

## 2023-10-13 NOTE — TELEPHONE ENCOUNTER
Pt is calling states everytime she receives her cardizem from pharmacy the pills are different in color explained because probably generic and diffferent suppliers are used. She stated took new one today and feels a little itchy will see if subsides, will check with pharmacy about changing brands. Told can try benedryl for itching but if any changes like a rash or hives to go to ED. If concerns overweekend will contact on call MD. Pt not sure from the medication but will monitor.

## 2023-10-16 ENCOUNTER — OFFICE VISIT (OUTPATIENT)
Dept: FAMILY MEDICINE CLINIC | Facility: CLINIC | Age: 78
End: 2023-10-16
Payer: MEDICARE

## 2023-10-16 VITALS
BODY MASS INDEX: 21.72 KG/M2 | TEMPERATURE: 97.9 F | SYSTOLIC BLOOD PRESSURE: 140 MMHG | HEART RATE: 78 BPM | HEIGHT: 64 IN | WEIGHT: 127.2 LBS | DIASTOLIC BLOOD PRESSURE: 62 MMHG

## 2023-10-16 DIAGNOSIS — C43.9 MALIGNANT MELANOMA OF SKIN (HCC): ICD-10-CM

## 2023-10-16 DIAGNOSIS — I48.0 PAROXYSMAL ATRIAL FIBRILLATION (HCC): ICD-10-CM

## 2023-10-16 DIAGNOSIS — I10 ESSENTIAL HYPERTENSION: Primary | ICD-10-CM

## 2023-10-16 DIAGNOSIS — K58.9 IRRITABLE BOWEL SYNDROME, UNSPECIFIED TYPE: ICD-10-CM

## 2023-10-16 DIAGNOSIS — J30.9 ALLERGIC RHINITIS, UNSPECIFIED SEASONALITY, UNSPECIFIED TRIGGER: ICD-10-CM

## 2023-10-16 DIAGNOSIS — M81.0 AGE-RELATED OSTEOPOROSIS WITHOUT CURRENT PATHOLOGICAL FRACTURE: ICD-10-CM

## 2023-10-16 DIAGNOSIS — I34.0 NONRHEUMATIC MITRAL VALVE REGURGITATION: ICD-10-CM

## 2023-10-16 DIAGNOSIS — F41.1 GENERALIZED ANXIETY DISORDER: ICD-10-CM

## 2023-10-16 DIAGNOSIS — E78.00 HYPERCHOLESTEROLEMIA: ICD-10-CM

## 2023-10-16 DIAGNOSIS — K21.9 GERD WITHOUT ESOPHAGITIS: ICD-10-CM

## 2023-10-16 PROCEDURE — 99214 OFFICE O/P EST MOD 30 MIN: CPT | Performed by: PHYSICIAN ASSISTANT

## 2023-10-16 NOTE — ASSESSMENT & PLAN NOTE
Pts BM have been a different color and different constancy- some times often and sometimes constipated.

## 2023-10-16 NOTE — ASSESSMENT & PLAN NOTE
Symptoms are stable and pt stopped her trial of singulair after only 2 weeks as it wasn't working. Can still trial in future if needed.

## 2023-10-16 NOTE — ASSESSMENT & PLAN NOTE
Pt. is very upset today as cardio is recommending MVR via open heart. Pt feels like she is going to die soon due to this dx. I took a lot of time today trying to rationalize dx with pt that she has had this dx longer than she knows, she is still able to perform all tasks of life and goes up and down her stairs at home. I told her that I know many pts and acquaintances who have undergone mitral valve replacement and are doing just fine. Pt has cardio apt in 3 months.

## 2023-10-16 NOTE — PROGRESS NOTES
Name: Donna Almonte      : 1945      MRN: [id number]  Encounter Provider: Jacinda Quick PA-C  Encounter Date: 10/16/2023   Encounter department: [facility] PRIMARY CARE    Assessment & Plan     1. Essential hypertension  Assessment & Plan:  Top normal today. 2. GERD without esophagitis  Assessment & Plan:  Pt. has been having intermittent nausea, stomach aches since all her new meds from her Afib were started in July. Even on prilosec daily. 3. Paroxysmal atrial fibrillation [facility])  Assessment & Plan:  Continue on Xarelto and his profile and cardiology follow-up. 4. Age-related osteoporosis without current pathological fracture  Assessment & Plan:  Patient does have a DEXA scan to complete. Vitamin D is normal.      5. Hypercholesterolemia  Assessment & Plan:  Patient on Lipitor 20 mg keeping LDL at goal at 68. Continue recheck 6 months continue with cardiology. Orders:  -     Comprehensive metabolic panel; Future; Expected date: 2024  -     Lipid Panel with Direct LDL reflex; Future; Expected date: 2024    6. Generalized anxiety disorder  Assessment & Plan:  Patient uses Xanax as needed. Pt is very anxious today due to her medications not making her feel well. Pt. has been using 1/4 0.25 mg nightly. 7. Malignant melanoma of skin ([address])  Assessment & Plan:  Patient encouraged to continue with dermatology. 8. Allergic rhinitis, unspecified seasonality, unspecified trigger  Assessment & Plan:  Symptoms are stable and pt stopped her trial of singulair after only 2 weeks as it wasn't working. Can still trial in future if needed. 9. Irritable bowel syndrome, unspecified type  Assessment & Plan:  Pts BM have been a different color and different constancy- some times often and sometimes constipated. 10.  mitral valve prolapse with moderate regurgitation  Assessment & Plan:  Pt. is very upset today as cardio is recommending MVR via open heart.  Pt feels like she is going to die soon due to this dx. I took a lot of time today trying to rationalize dx with pt that she has had this dx longer than she knows, she is still able to perform all tasks of life and goes up and down her stairs at home. I told her that I know many pts and acquaintances who have undergone mitral valve replacement and are doing just fine. Pt has cardio apt in 3 months. Subjective        Apolinar Underwood is here for chronic conditions f/u.  Pt. had labs done prior to today's visit which included Recent Results (from the past 672 hour(s))  -Vitamin D 25 hydroxy:   Collection Time: 10/03/23  7:58 AM       Result                      Value             Ref Range           Vit D, 25-Hydroxy           60.3              30.0 - 100.0*  -Comprehensive metabolic panel:   Collection Time: 10/03/23  7:58 AM       Result                      Value             Ref Range           Sodium                      139               135 - 147 mm*       Potassium                   4.2               3.5 - 5.3 mm*       Chloride                    106               96 - 108 mmo*       CO2                         28                21 - 32 mmol*       ANION GAP                   5                 mmol/L              BUN                         26 (H)            5 - 25 mg/dL        Creatinine                  0.82              0.60 - 1.30 *       Glucose, Fasting            96                65 - 99 mg/dL       Calcium                     9.4               8.4 - 10.2 m*       AST                         18                13 - 39 U/L         ALT                         28                7 - 52 U/L          Alkaline Phosphatase        76                34 - 104 U/L        Total Protein               7.2               6.4 - 8.4 g/*       Albumin                     4.2               3.5 - 5.0 g/*       Total Bilirubin             0.54              0.20 - 1.00 *       eGFR                        69 ml/min/1.73s*  -Lipid Panel with Direct LDL reflex:   Collection Time: 10/03/23  7:58 AM       Result                      Value             Ref Range           Cholesterol                 132               See Comment *       Triglycerides               78                See Comment *       HDL, Direct                 48 (L)            >=50 mg/dL          LDL Calculated              68                0 - 100 mg/dL  -CBC and differential:   Collection Time: 10/03/23  7:58 AM       Result                      Value             Ref Range           WBC                         7.16              4.31 - 10.16*       RBC                         4.63              3.81 - 5.12 *       Hemoglobin                  14.0              11.5 - 15.4 *       Hematocrit                  42.3              34.8 - 46.1 %       MCV                         91                82 - 98 fL          MCH                         30.2              26.8 - 34.3 *       MCHC                        33.1              31.4 - 37.4 *       RDW                         14.2              11.6 - 15.1 %       MPV                         10.1              8.9 - 12.7 fL       Platelets                   269               149 - 390 Th*       nRBC                        0                 /100 WBCs           Neutrophils Relative        54                43 - 75 %           Immat GRANS %               0                 0 - 2 %             Lymphocytes Relative        36                14 - 44 %           Monocytes Relative          7                 4 - 12 %            Eosinophils Relative        2                 0 - 6 %             Basophils Relative          1                 0 - 1 %             Neutrophils Absolute        3.86              1.85 - 7.62 *       Immature Grans Absolute     0.01              0.00 - 0.20 *       Lymphocytes Absolute        2.59              0.60 - 4.47 *       Monocytes Absolute          0.51              0.17 - 1.22 * Eosinophils Absolute        0.14              0.00 - 0.61 *       Basophils Absolute          0.05              0.00 - 0.10 *        Review of Systems   Constitutional: Negative. HENT: Negative. Eyes: Negative. Respiratory: Negative. Cardiovascular: Negative. Gastrointestinal: Negative. Endocrine: Negative. Genitourinary: Negative. Musculoskeletal: Negative. Skin: Negative. Allergic/Immunologic: Negative. Neurological: Negative. Hematological: Negative. Psychiatric/Behavioral: Negative. Current Outpatient Medications on File Prior to Visit   Medication Sig   • ALPRAZolam (XANAX) 0.25 mg tablet Take 1 tablet (0.25 mg total) by mouth daily as needed for anxiety   • atorvastatin (LIPITOR) 20 mg tablet Take 1 tablet (20 mg total) by mouth daily   • b complex vitamins capsule Take 1 capsule by mouth daily   • BIOTIN PO Take by mouth   • bisoprolol (ZEBETA) 5 mg tablet Take 1 tablet (5 mg total) by mouth daily   • calcium carbonate (OS-BRYCE) 600 MG tablet Take 600 mg by mouth 2 (two) times a day with meals   • Cholecalciferol (VITAMIN D3) 1000 units CAPS Take 2 capsules by mouth daily   • diltiazem (CARDIZEM CD) 240 mg 24 hr capsule Take 1 capsule (240 mg total) by mouth daily   • Lactobacillus Rhamnosus, GG, (CULTURELLE PO) Take by mouth daily   • losartan (COZAAR) 25 mg tablet Take 1 tablet (25 mg total) by mouth daily at bedtime   • omeprazole (PriLOSEC) 20 mg delayed release capsule Take 1 capsule (20 mg total) by mouth daily   • rivaroxaban (Xarelto) 20 mg tablet Take 1 tablet (20 mg total) by mouth daily with breakfast   • Vitamin Mixture (Glenna-C) 500-60 MG TABS Take by mouth       Objective     /62 (BP Location: Left arm, Patient Position: Sitting, Cuff Size: Standard)   Pulse 78   Temp 97.9 °F (36.6 °C) (Temporal)   Ht 5' 4" (1.626 m) Comment: on file  Wt 57.7 kg (127 lb 3.2 oz)   BMI 21.83 kg/m²     Physical Exam  Vitals and nursing note reviewed. Constitutional:       General: She is not in acute distress. Appearance: She is well-developed. She is not diaphoretic. HENT:      Head: Normocephalic and atraumatic. Eyes:      General:         Right eye: No discharge. Left eye: No discharge. Conjunctiva/sclera: Conjunctivae normal.   Neck:      Vascular: No carotid bruit. Cardiovascular:      Rate and Rhythm: Normal rate and regular rhythm. Heart sounds: Murmur heard. Systolic murmur is present with a grade of 3/6. No friction rub. No gallop. Pulmonary:      Effort: Pulmonary effort is normal. No respiratory distress. Breath sounds: Normal breath sounds. No wheezing or rales. Musculoskeletal:      Cervical back: Neck supple. Right lower leg: No edema. Left lower leg: No edema. Skin:     General: Skin is warm and dry. Neurological:      Mental Status: She is alert and oriented to person, place, and time.    Psychiatric:         Judgment: Judgment normal.       Chava Correa PA-C

## 2023-10-16 NOTE — PATIENT INSTRUCTIONS
Problem List Items Addressed This Visit          Digestive    GERD without esophagitis     Pt. has been having intermittent nausea, stomach aches since all her new meds from her Afib were started in July. Even on prilosec daily. Irritable bowel syndrome     Pts BM have been a different color and different constancy- some times often and sometimes constipated. Respiratory    Allergic rhinitis     Symptoms are stable and pt stopped her trial of singulair after only 2 weeks as it wasn't working. Can still trial in future if needed. Cardiovascular and Mediastinum    Essential hypertension - Primary     Top normal today. mitral valve prolapse with moderate regurgitation     Pt. is very upset today as cardio is recommending MVR via open heart. Paroxysmal atrial fibrillation (HCC)     Continue on Xarelto and his profile and cardiology follow-up. Musculoskeletal and Integument    Malignant melanoma of skin [facility])     Patient encouraged to continue with dermatology. Age-related osteoporosis without current pathological fracture     Patient does have a DEXA scan to complete. Vitamin D is normal.            Other    Generalized anxiety disorder     Patient uses Xanax as needed. Pt is very anxious today due to her medications not making her feel well. Pt. has been using 1/4 0.25 mg nightly. Hypercholesterolemia     Patient on Lipitor 20 mg keeping LDL at goal at 68. Continue recheck 6 months continue with cardiology.          Relevant Orders    Comprehensive metabolic panel    Lipid Panel with Direct LDL reflex

## 2023-10-16 NOTE — ASSESSMENT & PLAN NOTE
Pt. has been having intermittent nausea, stomach aches since all her new meds from her Afib were started in July. Even on prilosec daily.

## 2023-10-16 NOTE — ASSESSMENT & PLAN NOTE
Patient uses Xanax as needed. Pt is very anxious today due to her medications not making her feel well. Pt. has been using 1/4 0.25 mg nightly.

## 2023-10-16 NOTE — ASSESSMENT & PLAN NOTE
Patient on Lipitor 20 mg keeping LDL at goal at 68. Continue recheck 6 months continue with cardiology.

## 2023-11-02 ENCOUNTER — CLINICAL SUPPORT (OUTPATIENT)
Dept: FAMILY MEDICINE CLINIC | Facility: CLINIC | Age: 78
End: 2023-11-02

## 2023-11-02 DIAGNOSIS — Z23 NEED FOR COVID-19 VACCINE: Primary | ICD-10-CM

## 2023-11-06 ENCOUNTER — OFFICE VISIT (OUTPATIENT)
Dept: FAMILY MEDICINE CLINIC | Facility: CLINIC | Age: 78
End: 2023-11-06
Payer: MEDICARE

## 2023-11-06 VITALS
SYSTOLIC BLOOD PRESSURE: 144 MMHG | WEIGHT: 126.6 LBS | HEART RATE: 66 BPM | TEMPERATURE: 98.6 F | HEIGHT: 64 IN | DIASTOLIC BLOOD PRESSURE: 66 MMHG | BODY MASS INDEX: 21.61 KG/M2

## 2023-11-06 DIAGNOSIS — K21.9 GERD WITHOUT ESOPHAGITIS: ICD-10-CM

## 2023-11-06 DIAGNOSIS — S39.012A BACK STRAIN, INITIAL ENCOUNTER: ICD-10-CM

## 2023-11-06 DIAGNOSIS — K58.9 IRRITABLE BOWEL SYNDROME, UNSPECIFIED TYPE: Primary | ICD-10-CM

## 2023-11-06 PROCEDURE — 99213 OFFICE O/P EST LOW 20 MIN: CPT | Performed by: PHYSICIAN ASSISTANT

## 2023-11-06 NOTE — PATIENT INSTRUCTIONS
Problem List Items Addressed This Visit          Digestive    GERD without esophagitis     Still symptomatic with prilosec. Relevant Orders    Ambulatory referral to Gastroenterology    Irritable bowel syndrome - Primary     Runs to the bathroom with cramps every am for at least 3 times. Refer back to [place] and already goes to [facility]. Relevant Orders    Ambulatory referral to Gastroenterology     Other Visit Diagnoses       Back strain, initial encounter        Heat thermic care or Salonpas May use topical Bengay or Voltaren Tylenol if needed stretches.

## 2023-11-06 NOTE — ASSESSMENT & PLAN NOTE
Runs to the bathroom with cramps every am for at least 3 times. Refer back to [place] and already goes to [facility].

## 2023-11-06 NOTE — PROGRESS NOTES
Name: Madeleine Blanc      : 1945      MRN: [id number]  Encounter Provider: Latanya Smith PA-C  Encounter Date: 2023   Encounter department: [facility] PRIMARY CARE    Assessment & Plan     1. Irritable bowel syndrome, unspecified type  Assessment & Plan:  Runs to the bathroom with cramps every am for at least 3 times. Refer back to [place] and already goes to [facility]. Orders:  -     Ambulatory referral to Gastroenterology; Future    2. GERD without esophagitis  Assessment & Plan:  Still symptomatic with prilosec. Orders:  -     Ambulatory referral to Gastroenterology; Future    3. Back strain, initial encounter  Comments:  Heat thermic care or Salonpas May use topical Bengay or Voltaren Tylenol if needed stretches. Subjective      Patient presents with:  Back Pain: Lower back pain & stiffness more than anything. Pt states this started last  but has improved. Leg Problem: Pt c/o leg cramping to right leg. Patient ruled out piecrust and had to throw it out because he did not work out and had to do it all over again then she did a lot of housecleaning and then sat for about 5 hours playing cards the next day roughly 24 hours later she had a lot of right back pain and discomfort she with her new medications for cardio including Xarelto did not take anything with Tylenol and was afraid of anything topically on it. She then had a severe leg cramp and she is still sore from on the right side. In general patient has not been feeling well since she was hospitalized and placed on all these new medications. Her reflux and epigastric discomfort irritable bowel syndrome with frequent morning bowel movements and cramping is absolutely not controlled. Patient does see Dr. Amadeo Calloway group has not seen him since  had an EGD which was normal.      Back Pain  This is a new problem. The current episode started in the past 7 days. The problem occurs daily.  The problem has been waxing and waning since onset. The pain is present in the sacro-iliac and thoracic spine. The quality of the pain is described as aching and burning. The pain radiates to the left thigh. The pain is at a severity of 7/10. The pain is Worse during the day. The symptoms are aggravated by bending and sitting. Stiffness is present In the morning. Associated symptoms include leg pain. Risk factors include history of cancer, history of osteoporosis and menopause. Review of Systems   Constitutional: Negative. HENT: Negative. Eyes: Negative. Respiratory: Negative. Cardiovascular: Negative. Gastrointestinal: Negative. Endocrine: Negative. Genitourinary: Negative. Musculoskeletal:  Positive for back pain. Leg cramping   Skin: Negative. Allergic/Immunologic: Negative. Neurological: Negative. Hematological: Negative. Psychiatric/Behavioral: Negative.          Current Outpatient Medications on File Prior to Visit   Medication Sig   • ALPRAZolam (XANAX) 0.25 mg tablet Take 1 tablet (0.25 mg total) by mouth daily as needed for anxiety   • atorvastatin (LIPITOR) 20 mg tablet Take 1 tablet (20 mg total) by mouth daily   • b complex vitamins capsule Take 1 capsule by mouth daily   • BIOTIN PO Take by mouth   • bisoprolol (ZEBETA) 5 mg tablet Take 1 tablet (5 mg total) by mouth daily   • calcium carbonate (OS-BRYCE) 600 MG tablet Take 600 mg by mouth 2 (two) times a day with meals   • Cholecalciferol (VITAMIN D3) 1000 units CAPS Take 2 capsules by mouth daily   • diltiazem (CARDIZEM CD) 240 mg 24 hr capsule Take 1 capsule (240 mg total) by mouth daily   • Lactobacillus Rhamnosus, GG, (CULTURELLE PO) Take by mouth daily   • losartan (COZAAR) 25 mg tablet Take 1 tablet (25 mg total) by mouth daily at bedtime   • omeprazole (PriLOSEC) 20 mg delayed release capsule Take 1 capsule (20 mg total) by mouth daily   • rivaroxaban (Xarelto) 20 mg tablet Take 1 tablet (20 mg total) by mouth daily with breakfast   • Vitamin Mixture (Glenna-C) 500-60 MG TABS Take by mouth       Objective     /66 (BP Location: Left arm, Patient Position: Sitting, Cuff Size: Standard)   Pulse 66   Temp 98.6 °F (37 °C) (Temporal)   Ht 5' 4" (1.626 m)   Wt 57.4 kg (126 lb 9.6 oz)   BMI 21.73 kg/m²     Physical Exam  Vitals and nursing note reviewed. Constitutional:       General: She is not in acute distress. Appearance: She is well-developed. She is not diaphoretic. HENT:      Head: Normocephalic and atraumatic. Nose: Nose normal.   Eyes:      General:         Right eye: No discharge. Left eye: No discharge. Conjunctiva/sclera: Conjunctivae normal.   Neck:      Vascular: No carotid bruit. Cardiovascular:      Rate and Rhythm: Normal rate and regular rhythm. Heart sounds: Normal heart sounds. No murmur heard. No friction rub. No gallop. Pulmonary:      Effort: Pulmonary effort is normal. No respiratory distress. Breath sounds: Normal breath sounds. No wheezing or rales. Musculoskeletal:      Cervical back: Neck supple. Comments: Her right calf no edema negative Homans. Patient moving up and down exam table without discomfort. Skin:     General: Skin is warm and dry. Neurological:      Mental Status: She is alert and oriented to person, place, and time.    Psychiatric:         Judgment: Judgment normal.       Shanti Solis PA-C

## 2024-01-09 ENCOUNTER — TELEPHONE (OUTPATIENT)
Dept: CARDIOLOGY CLINIC | Facility: CLINIC | Age: 79
End: 2024-01-09

## 2024-01-09 ENCOUNTER — OFFICE VISIT (OUTPATIENT)
Dept: CARDIOLOGY CLINIC | Facility: CLINIC | Age: 79
End: 2024-01-09
Payer: MEDICARE

## 2024-01-09 VITALS
BODY MASS INDEX: 21.41 KG/M2 | WEIGHT: 125.4 LBS | SYSTOLIC BLOOD PRESSURE: 140 MMHG | HEART RATE: 68 BPM | HEIGHT: 64 IN | DIASTOLIC BLOOD PRESSURE: 52 MMHG

## 2024-01-09 DIAGNOSIS — E78.00 HYPERCHOLESTEROLEMIA: ICD-10-CM

## 2024-01-09 DIAGNOSIS — I35.8 AORTIC VALVE SCLEROSIS: ICD-10-CM

## 2024-01-09 DIAGNOSIS — I72.8 SPLENIC ARTERY ANEURYSM (HCC): ICD-10-CM

## 2024-01-09 DIAGNOSIS — I10 ESSENTIAL HYPERTENSION: ICD-10-CM

## 2024-01-09 DIAGNOSIS — I48.91 ATRIAL FIBRILLATION WITH RAPID VENTRICULAR RESPONSE (HCC): ICD-10-CM

## 2024-01-09 DIAGNOSIS — I48.0 PAROXYSMAL ATRIAL FIBRILLATION (HCC): Primary | ICD-10-CM

## 2024-01-09 DIAGNOSIS — I25.10 NONOBSTRUCTIVE ATHEROSCLEROSIS OF CORONARY ARTERY: ICD-10-CM

## 2024-01-09 DIAGNOSIS — I34.0 NONRHEUMATIC MITRAL VALVE REGURGITATION: ICD-10-CM

## 2024-01-09 DIAGNOSIS — I48.91 NEW ONSET ATRIAL FIBRILLATION (HCC): ICD-10-CM

## 2024-01-09 PROCEDURE — 99215 OFFICE O/P EST HI 40 MIN: CPT | Performed by: INTERNAL MEDICINE

## 2024-01-09 RX ORDER — DILTIAZEM HYDROCHLORIDE 240 MG/1
240 CAPSULE, COATED, EXTENDED RELEASE ORAL DAILY
Qty: 30 CAPSULE | Refills: 5 | Status: SHIPPED | OUTPATIENT
Start: 2024-01-09

## 2024-01-09 RX ORDER — BISOPROLOL FUMARATE 5 MG/1
2.5 TABLET, FILM COATED ORAL DAILY
Qty: 45 TABLET | Refills: 1 | Status: SHIPPED | OUTPATIENT
Start: 2024-01-09

## 2024-01-09 NOTE — PROGRESS NOTES
[facility]  [address] [place] [address]  Phone#  [phone]   Fax#  [phone]  *-*-*-*-*-*-*-*-*-*-*-*-*-*-*-*-*-*-*-*-*-*-*-*-*-*-*-*-*-*-*-*-*-*-*-*-*-*-*-*-*-*-*-*-*-*-*-*-*-*-*-*-*-*                                   Cardiology Follow Up      ENCOUNTER DATE: 24 1:56 PM  PATIENT NAME: Zenia Youssef   : 1945    MRN: [id number]  AGE:78 y.o.      SEX: female  ENCOUNTER PROVIDER:Cosmo King MD     PRIMARY CARE PHYSICIAN: Didi Talley PA-C    ACTIVE DIAGNOSIS THIS VISIT  1. Paroxysmal atrial fibrillation (HCC)        2. Nonobstructive atherosclerosis of coronary artery        3. Hypercholesterolemia        4.  mitral valve prolapse with moderate regurgitation        5. Aortic valve sclerosis        6. Essential hypertension        7. Splenic artery aneurysm (HCC)          ACTIVE PROBLEM LIST  Patient Active Problem List   Diagnosis    Generalized anxiety disorder    Diverticulosis    GERD without esophagitis    Hiatal hernia    Hypercholesterolemia    Essential hypertension    Irritable bowel syndrome    Malignant melanoma of skin (HCC)    Age-related osteoporosis without current pathological fracture    Squamous cell carcinoma of skin    Splenic artery aneurysm (HCC)    Other shoulder lesions, unspecified shoulder    Other specified disorders of rotator cuff syndrome of shoulder and allied disorders    Healthcare maintenance    Hair loss     mitral valve prolapse with moderate regurgitation    Angina pectoris (HCC)    Nonobstructive atherosclerosis of coronary artery    Paroxysmal atrial fibrillation (HCC)    Aortic valve sclerosis    Allergic rhinitis       CARDIOLOGY SPECIALTY COMMENTS  Patient was 1st seen on 2021. Patient is a is a 76-year-old female who developed symptoms of chest tightness radiating into her neck and jaw with shortness of breath on physical exertion such as going up a flight of stairs.  The discomfort would melba with rest.   Her primary care physician ordered a stress echocardiogram which was nondiagnostic and equivocal for myocardial ischemia.  Mild mitral regurgitation was noted on the echocardiogram.  Patient states that since the stress test, her exertional chest tightness and shortness of breath has been much less severe.  Patient has no family history of coronary artery disease.  A sister has a murmur.  She is a lifetime nonsmoker.  Patient had a melanoma removed 11 years ago and has had no evidence of recurrence.    06/23/2021 nuclear stress test: Resting hypertension with exaggerated hypertensive response to exercise negative treadmill stress test for angina pectoris but with abnormal ST depression with exercise. Exercise induced ventricular ectopy. Normal LV systolic function, EF 77%. Normal tomographic perfusion series.    06/01/2021 echocardiogram:  Normal LV function EF 60%, mild concentric LVH.  Grade 1 diastolic dysfunction.  Mild-to-moderate left atrial enlargement.  Moderate mitral regurgitation.    02/24/2022 cardiac catheterization: Ostial circumflex lesion 50% stenosis.    7/12-7/14/2023 [facility] new onset atrial fibrillation with RVR    7/12/2023 echocardiogram: Normal left ventricular systolic function, EF 60% atrial fibrillation with diastolic dysfunction.  Sigmoid shaped septum.  Severe LA enlargement.  Mild RA dilated.  Aortic sclerosis.  Valve posterior leaflet prolapse with moderate regurgitation.  Mild to moderate tricuspid regurgitation.  PASP 59 mmHg.    INTERVAL HISTORY:        Patient with significant mitral regurgitation returns.  She also has a little valve disease.  Everything she started losartan, she has been having abdominal pain.  I have never seen abdominal pain with losartan but when I researched it, it is reported.  It is also reported with all the other ARB's and reported with hydralazine.  I feel it best to stop the losartan and see if her abdominal pain improves.    Patient denies having  any recent palpitations.  She does have a history of prior atrial for she denies any recent chest discomfort.  She had a cardiac catheterization in February 2022 for chest discomfort and had an ostial circumflex Jennie which was only 50%.    She has some dizziness and lightheadedness.  Today her blood pressure is 140/52 although her home blood pressures are around 100/70 and at times drops slightly below 100 systolic.    Patient has the habit of distracting me from the main problem which is her mitral regurgitation and whether or not her mitral valve can be repaired or is patient a candidate for valve replacement.  I think a robotic repair might be best at her age on the other hand and open repair might also provide a chance to do a maze procedure to control her atrial fibrillation at the same time.  Hopefully patient will not need a valve replacement since I do not think she will do as well with the valve replacement.    DISCUSSION/PLAN:          [facility]ANG[facility]O at [facility] with goal of deciding whether or not the mitral valve can be repaired or whether patient should have a valve replacement.  Return iron in 6 weeks.    Lab Studies:    Lab Results   Component Value Date    CHOLESTEROL 132 10/03/2023    CHOLESTEROL 124 03/23/2023    CHOLESTEROL 129 09/20/2022     Lab Results   Component Value Date    TRIG 78 10/03/2023    TRIG 61 03/23/2023    TRIG 62 09/20/2022     Lab Results   Component Value Date    HDL 48 (L) 10/03/2023    HDL 49 (L) 03/23/2023    HDL 53 09/20/2022     Lab Results   Component Value Date    LDLCALC 68 10/03/2023    LDLCALC 63 03/23/2023    LDLCALC 64 09/20/2022     Lab Results   Component Value Date    LDLDIRECT 112 05/07/2014     Lab Results   Component Value Date    EGFR 69 10/03/2023    EGFR 66 07/20/2023    EGFR 71 07/14/2023    SODIUM 139 10/03/2023    SODIUM 140 07/20/2023    SODIUM 139 07/14/2023    K 4.2 10/03/2023    K 4.2 07/20/2023    K 3.9 07/14/2023     10/03/2023      07/20/2023     (H) 07/14/2023    CO2 28 10/03/2023    CO2 27 07/20/2023    CO2 25 07/14/2023    ANIONGAP 5 05/07/2014    BUN 26 (H) 10/03/2023    BUN 22 07/20/2023    BUN 22 07/14/2023    CREATININE 0.82 10/03/2023    CREATININE 0.85 07/20/2023    CREATININE 0.80 07/14/2023     Lab Results   Component Value Date    WBC 7.16 10/03/2023    WBC 6.71 07/20/2023    WBC 8.03 07/14/2023    HGB 14.0 10/03/2023    HGB 12.9 07/20/2023    HGB 12.8 07/14/2023    HCT 42.3 10/03/2023    HCT 39.8 07/20/2023    HCT 39.5 07/14/2023    MCV 91 10/03/2023    MCV 92 07/20/2023    MCV 92 07/14/2023    MCH 30.2 10/03/2023    MCH 29.9 07/20/2023    MCH 29.8 07/14/2023    MCHC 33.1 10/03/2023    MCHC 32.4 07/20/2023    MCHC 32.4 07/14/2023     10/03/2023     07/20/2023     07/14/2023      Lab Results   Component Value Date    GLUCOSE 98 05/07/2014    CALCIUM 9.4 10/03/2023    CALCIUM 9.3 07/20/2023    CALCIUM 9.1 07/14/2023    AST 18 10/03/2023    AST 26 07/12/2023    AST 27 03/23/2023    ALT 28 10/03/2023    ALT 33 07/12/2023    ALT 49 03/23/2023    ALKPHOS 76 10/03/2023    ALKPHOS 88 07/12/2023    ALKPHOS 82 03/23/2023    PROT 7.4 05/07/2014    BILITOT 0.37 05/07/2014       No results found for this visit on 01/09/24.      Current Outpatient Medications:     ALPRAZolam (XANAX) 0.25 mg tablet, Take 1 tablet (0.25 mg total) by mouth daily as needed for anxiety, Disp: 30 tablet, Rfl: 0    atorvastatin (LIPITOR) 20 mg tablet, Take 1 tablet (20 mg total) by mouth daily, Disp: 90 tablet, Rfl: 3    b complex vitamins capsule, Take 1 capsule by mouth daily, Disp: , Rfl:     BIOTIN PO, Take by mouth, Disp: , Rfl:     bisoprolol (ZEBETA) 5 mg tablet, Take 1 tablet (5 mg total) by mouth daily, Disp: 90 tablet, Rfl: 1    calcium carbonate (OS-BRYCE) 600 MG tablet, Take 600 mg by mouth 2 (two) times a day with meals, Disp: , Rfl:     Cholecalciferol (VITAMIN D3) 1000 units CAPS, Take 2 capsules by mouth daily, Disp: , Rfl:      diltiazem (CARDIZEM CD) 240 mg 24 hr capsule, Take 1 capsule (240 mg total) by mouth daily, Disp: 30 capsule, Rfl: 5    Lactobacillus Rhamnosus, GG, (CULTURELLE PO), Take by mouth daily, Disp: , Rfl:     losartan (COZAAR) 25 mg tablet, Take 1 tablet (25 mg total) by mouth daily at bedtime, Disp: 90 tablet, Rfl: 1    rivaroxaban (Xarelto) 20 mg tablet, Take 1 tablet (20 mg total) by mouth daily with breakfast, Disp: 30 tablet, Rfl: 5    Vitamin Mixture ([place]-C) 500-60 MG TABS, Take by mouth, Disp: , Rfl:   Allergies   Allergen Reactions    Macrobid [Nitrofurantoin] GI Intolerance    Penicillins Other (See Comments)     Childhood. Per pt, unsure of reaction    Protonix [Pantoprazole] Nausea Only    Wound Dressing Adhesive Other (See Comments)     bandaid if on for more than 24 hours       Past Medical History:   Diagnosis Date    Anxiety     Diverticulosis     GERD (gastroesophageal reflux disease)     Hiatal hernia     Hypertension     Melanoma (HCC)     Osteoporosis      Social History     Socioeconomic History    Marital status: /Civil Union     Spouse name: Not on file    Number of children: Not on file    Years of education: Not on file    Highest education level: Not on file   Occupational History    Occupation: retired - [employer]   Tobacco Use    Smoking status: Never    Smokeless tobacco: Never    Tobacco comments:     No secondhand smoke exposure    Vaping Use    Vaping status: Never Used   Substance and Sexual Activity    Alcohol use: Not Currently     Comment: very rare socially    Drug use: Not on file    Sexual activity: Not on file   Other Topics Concern    Not on file   Social History Narrative    Not on file     Social Determinants of Health     Financial Resource Strain: Low Risk  (4/10/2023)    Overall Financial Resource Strain (CARDIA)     Difficulty of Paying Living Expenses: Not hard at all   Food Insecurity: Not on file   Transportation Needs: No Transportation Needs  (4/10/2023)    PRAPARE - Transportation     Lack of Transportation (Medical): No     Lack of Transportation (Non-Medical): No   Physical Activity: Not on file   Stress: Not on file   Social Connections: Not on file   Intimate Partner Violence: Not on file   Housing Stability: Not on file      Family History   Problem Relation Age of Onset    Uterine cancer Mother     Prostate cancer Father     Osteoporosis Sister      Past Surgical History:   Procedure Laterality Date    CARDIAC CATHETERIZATION N/A 02/24/2022    Procedure: Cardiac catheterization;  Surgeon: Harinder Calvo MD;  Location: [facility];  Service: Cardiology    CARDIAC CATHETERIZATION N/A 02/24/2022    Procedure: Cardiac Coronary Angiogram;  Surgeon: Harinder Calvo MD;  Location: [facility];  Service: Cardiology    EGD  08/24/2021    JUAN Miller MD.- Normal duodenum; normal stomach; non-severe esophagitis. Bx: Neg. H. pylori, ulceration and dysplasia.    EGD AND COLONOSCOPY  05/14/2014    NORMAL/HP       PREVIOUS WEIGHTS:   Wt Readings from Last 10 Encounters:   01/09/24 56.9 kg (125 lb 6.4 oz)   01/04/24 57.2 kg (126 lb)   11/27/23 57.6 kg (127 lb)   11/06/23 57.4 kg (126 lb 9.6 oz)   10/16/23 57.7 kg (127 lb 3.2 oz)   10/05/23 57.2 kg (126 lb)   08/15/23 59 kg (130 lb)   07/27/23 57.4 kg (126 lb 9.6 oz)   07/25/23 57.6 kg (127 lb)   07/12/23 59 kg (130 lb)        Review of Systems:  Review of Systems   Respiratory:  Negative for cough, choking, chest tightness, shortness of breath, wheezing and stridor.    Cardiovascular:  Negative for chest pain, palpitations and leg swelling.   Gastrointestinal:  Positive for abdominal pain.   Musculoskeletal:  Negative for gait problem.   Skin:  Negative for rash.   Neurological:  Negative for dizziness, tremors, syncope, weakness, light-headedness, numbness and headaches.   Psychiatric/Behavioral:  Negative for agitation and behavioral problems. The patient is not hyperactive.        Physical  "Exam:  /52 (BP Location: Left arm, Patient Position: Sitting, Cuff Size: Adult)   Pulse 68   Ht 5' 4\" (1.626 m)   Wt 56.9 kg (125 lb 6.4 oz)   BMI 21.52 kg/m²     Physical Exam  Constitutional:       General: She is not in acute distress.     Appearance: She is well-developed.   HENT:      Head: Normocephalic and atraumatic.   Neck:      Thyroid: No thyromegaly.      Vascular: No carotid bruit or JVD.      Trachea: No tracheal deviation.   Cardiovascular:      Rate and Rhythm: Normal rate and regular rhythm.      Pulses: Normal pulses.      Heart sounds: Murmur heard.      Systolic murmur is present with a grade of 4/6.      No friction rub. No gallop.      Comments: Holosystolic and loudest at the apex  Pulmonary:      Effort: Pulmonary effort is normal. No respiratory distress.      Breath sounds: Normal breath sounds. No wheezing, rhonchi or rales.   Chest:      Chest wall: No tenderness.   Musculoskeletal:         General: Normal range of motion.      Cervical back: Normal range of motion and neck supple.      Right lower leg: No edema.      Left lower leg: No edema.   Skin:     General: Skin is warm and dry.   Neurological:      General: No focal deficit present.      Mental Status: She is alert and oriented to person, place, and time.      Gait: Gait normal.   Psychiatric:         Mood and Affect: Mood normal.         Behavior: Behavior normal.         Thought Content: Thought content normal.         Judgment: Judgment normal.       ======================================================  Imaging:   I have personally reviewed pertinent reports.      Portions of the record may have been created with voice recognition software. Occasional wrong word or \"sound a like\" substitutions may have occurred due to the inherent limitations of voice recognition software. Read the chart carefully and recognize, using context, where substitutions have occurred.    SIGNATURES:   Cosmo King MD   "

## 2024-01-09 NOTE — TELEPHONE ENCOUNTER
Patient scheduled for DEANGELO @ [facility] on 1/22/2024 at 11:00am.  Instructions reviewed with patient.   CPT 48852    Please check prior auth.

## 2024-01-18 ENCOUNTER — APPOINTMENT (OUTPATIENT)
Dept: LAB | Facility: CLINIC | Age: 79
End: 2024-01-18
Payer: MEDICARE

## 2024-01-18 ENCOUNTER — TELEPHONE (OUTPATIENT)
Dept: CARDIOLOGY CLINIC | Facility: CLINIC | Age: 79
End: 2024-01-18

## 2024-01-18 DIAGNOSIS — R14.3 FLATULENCE: ICD-10-CM

## 2024-01-18 PROCEDURE — 86231 EMA EACH IG CLASS: CPT

## 2024-01-18 PROCEDURE — 36415 COLL VENOUS BLD VENIPUNCTURE: CPT

## 2024-01-18 PROCEDURE — 86364 TISS TRNSGLTMNASE EA IG CLAS: CPT

## 2024-01-18 PROCEDURE — 82784 ASSAY IGA/IGD/IGG/IGM EACH: CPT

## 2024-01-18 PROCEDURE — 86258 DGP ANTIBODY EACH IG CLASS: CPT

## 2024-01-18 NOTE — TELEPHONE ENCOUNTER
TC Dr King: Have her take the Cardizem in am with sip of water. Have her take the bisoprolol in the evening after she comes home from the hospital. Make sure she takes both drugs yh e previous day    Spoke with patient, aware of above.

## 2024-01-18 NOTE — TELEPHONE ENCOUNTER
Patient called, states she has DEANGELO scheduled Monday, take Diltiazem in AM and bisoprolol around 1-2 PM, her procedure is at 11 am, asking if she should take these medications. Also asking if she can take Xanax?    Advised will discuss with DR King and call with recommendations.     TC DR King.

## 2024-01-19 LAB
ENDOMYSIUM IGA SER QL: NEGATIVE
GLIADIN PEPTIDE IGA SER-ACNC: 12 UNITS (ref 0–19)
GLIADIN PEPTIDE IGG SER-ACNC: 3 UNITS (ref 0–19)
IGA SERPL-MCNC: 361 MG/DL (ref 64–422)
TTG IGA SER-ACNC: <2 U/ML (ref 0–3)
TTG IGG SER-ACNC: 3 U/ML (ref 0–5)

## 2024-01-22 ENCOUNTER — ANESTHESIA (OUTPATIENT)
Dept: NON INVASIVE DIAGNOSTICS | Facility: HOSPITAL | Age: 79
End: 2024-01-22

## 2024-01-22 ENCOUNTER — ANESTHESIA EVENT (OUTPATIENT)
Dept: NON INVASIVE DIAGNOSTICS | Facility: HOSPITAL | Age: 79
End: 2024-01-22

## 2024-01-22 ENCOUNTER — HOSPITAL ENCOUNTER (OUTPATIENT)
Dept: NON INVASIVE DIAGNOSTICS | Facility: HOSPITAL | Age: 79
Discharge: HOME | End: 2024-01-22
Payer: MEDICARE

## 2024-01-22 VITALS
HEIGHT: 64 IN | BODY MASS INDEX: 21.34 KG/M2 | OXYGEN SATURATION: 96 % | SYSTOLIC BLOOD PRESSURE: 124 MMHG | DIASTOLIC BLOOD PRESSURE: 59 MMHG | HEART RATE: 68 BPM | WEIGHT: 125 LBS

## 2024-01-22 DIAGNOSIS — I34.0 NONRHEUMATIC MITRAL VALVE REGURGITATION: ICD-10-CM

## 2024-01-22 DIAGNOSIS — I48.0 PAROXYSMAL ATRIAL FIBRILLATION (HCC): ICD-10-CM

## 2024-01-22 LAB
MV EROA: 0.2 CM2
MV VENA CONTRACTA: 0.8 CM
RA PRESSURE ESTIMATED: 8 MMHG
RV PSP: 54 MMHG
SL CV LV EF: 60
TR MAX PG: 46 MMHG
TR PEAK VELOCITY: 3.4 M/S

## 2024-01-22 PROCEDURE — 76376 3D RENDER W/INTRP POSTPROCES: CPT | Performed by: INTERNAL MEDICINE

## 2024-01-22 PROCEDURE — 93312 ECHO TRANSESOPHAGEAL: CPT

## 2024-01-22 PROCEDURE — 76376 3D RENDER W/INTRP POSTPROCES: CPT

## 2024-01-22 PROCEDURE — 93312 ECHO TRANSESOPHAGEAL: CPT | Performed by: INTERNAL MEDICINE

## 2024-01-22 PROCEDURE — 93320 DOPPLER ECHO COMPLETE: CPT | Performed by: INTERNAL MEDICINE

## 2024-01-22 PROCEDURE — 93325 DOPPLER ECHO COLOR FLOW MAPG: CPT | Performed by: INTERNAL MEDICINE

## 2024-01-22 RX ORDER — PROPOFOL 10 MG/ML
INJECTION, EMULSION INTRAVENOUS AS NEEDED
Status: DISCONTINUED | OUTPATIENT
Start: 2024-01-22 | End: 2024-01-22

## 2024-01-22 RX ORDER — SODIUM CHLORIDE 9 MG/ML
INJECTION, SOLUTION INTRAVENOUS CONTINUOUS PRN
Status: DISCONTINUED | OUTPATIENT
Start: 2024-01-22 | End: 2024-01-22

## 2024-01-22 RX ORDER — LIDOCAINE HYDROCHLORIDE 10 MG/ML
INJECTION, SOLUTION EPIDURAL; INFILTRATION; INTRACAUDAL; PERINEURAL AS NEEDED
Status: DISCONTINUED | OUTPATIENT
Start: 2024-01-22 | End: 2024-01-22

## 2024-01-22 RX ADMIN — SODIUM CHLORIDE: 0.9 INJECTION, SOLUTION INTRAVENOUS at 11:11

## 2024-01-22 RX ADMIN — LIDOCAINE HYDROCHLORIDE 60 MG: 10 INJECTION, SOLUTION EPIDURAL; INFILTRATION; INTRACAUDAL; PERINEURAL at 11:11

## 2024-01-22 RX ADMIN — PROPOFOL 70 MG: 10 INJECTION, EMULSION INTRAVENOUS at 11:11

## 2024-01-22 RX ADMIN — PROPOFOL 100 MCG/KG/MIN: 10 INJECTION, EMULSION INTRAVENOUS at 11:12

## 2024-01-22 NOTE — ANESTHESIA PREPROCEDURE EVALUATION
Procedure:  DEANGELO    Relevant Problems   CARDIO   (+)  mitral valve prolapse with moderate regurgitation   (+) Angina pectoris (HCC)   (+) Essential hypertension   (+) Hypercholesterolemia   (+) Nonobstructive atherosclerosis of coronary artery   (+) Paroxysmal atrial fibrillation (HCC)      GI/HEPATIC   (+) GERD without esophagitis   (+) Hiatal hernia      MUSCULOSKELETAL   (+) Hiatal hernia      NEURO/PSYCH   (+) Generalized anxiety disorder        Physical Exam    Airway    Mallampati score: I  TM Distance: >3 FB  Neck ROM: full     Dental       Cardiovascular  Cardiovascular exam normal    Pulmonary  Pulmonary exam normal     Other Findings  post-pubertal.      Anesthesia Plan  ASA Score- 3     Anesthesia Type- IV sedation with anesthesia with ASA Monitors.         Additional Monitors:     Airway Plan:            Plan Factors-Exercise tolerance (METS): >4 METS.    Chart reviewed. EKG reviewed. Imaging results reviewed. Existing labs reviewed. Patient summary reviewed.                  Induction- intravenous.    Postoperative Plan- Plan for postoperative opioid use. Planned trial extubation    Informed Consent- Anesthetic plan and risks discussed with patient.  I personally reviewed this patient with the CRNA. Discussed and agreed on the Anesthesia Plan with the CRNA..

## 2024-01-22 NOTE — ANESTHESIA POSTPROCEDURE EVALUATION
Post-Op Assessment Note    CV Status:  Stable  Pain Score: 0    Pain management: adequate       Mental Status:  Alert and awake   Hydration Status:  Euvolemic   PONV Controlled:  Controlled   Airway Patency:  Patent  Two or more mitigation strategies used for obstructive sleep apnea   Post Op Vitals Reviewed: Yes      Staff: CRNA               BP      Temp      Pulse     Resp      SpO2

## 2024-01-23 ENCOUNTER — TELEPHONE (OUTPATIENT)
Dept: NON INVASIVE DIAGNOSTICS | Facility: HOSPITAL | Age: 79
End: 2024-01-23

## 2024-02-19 DIAGNOSIS — F41.1 GENERALIZED ANXIETY DISORDER: ICD-10-CM

## 2024-02-19 RX ORDER — ALPRAZOLAM 0.25 MG/1
0.25 TABLET ORAL DAILY PRN
Qty: 30 TABLET | Refills: 0 | Status: SHIPPED | OUTPATIENT
Start: 2024-02-19

## 2024-02-20 ENCOUNTER — OFFICE VISIT (OUTPATIENT)
Dept: CARDIOLOGY CLINIC | Facility: CLINIC | Age: 79
End: 2024-02-20
Payer: MEDICARE

## 2024-02-20 VITALS
BODY MASS INDEX: 21.69 KG/M2 | HEART RATE: 63 BPM | WEIGHT: 125.4 LBS | SYSTOLIC BLOOD PRESSURE: 134 MMHG | DIASTOLIC BLOOD PRESSURE: 54 MMHG

## 2024-02-20 DIAGNOSIS — I48.0 PAROXYSMAL ATRIAL FIBRILLATION (HCC): Primary | ICD-10-CM

## 2024-02-20 DIAGNOSIS — E78.00 HYPERCHOLESTEROLEMIA: ICD-10-CM

## 2024-02-20 DIAGNOSIS — I34.0 NONRHEUMATIC MITRAL VALVE REGURGITATION: ICD-10-CM

## 2024-02-20 DIAGNOSIS — I10 ESSENTIAL HYPERTENSION: ICD-10-CM

## 2024-02-20 DIAGNOSIS — I72.8 SPLENIC ARTERY ANEURYSM (HCC): ICD-10-CM

## 2024-02-20 DIAGNOSIS — I35.8 AORTIC VALVE SCLEROSIS: ICD-10-CM

## 2024-02-20 DIAGNOSIS — R06.02 SOB (SHORTNESS OF BREATH) ON EXERTION: ICD-10-CM

## 2024-02-20 DIAGNOSIS — I25.10 NONOBSTRUCTIVE ATHEROSCLEROSIS OF CORONARY ARTERY: ICD-10-CM

## 2024-02-20 PROCEDURE — 93000 ELECTROCARDIOGRAM COMPLETE: CPT | Performed by: INTERNAL MEDICINE

## 2024-02-20 PROCEDURE — 99215 OFFICE O/P EST HI 40 MIN: CPT | Performed by: INTERNAL MEDICINE

## 2024-02-20 RX ORDER — CARVEDILOL 3.12 MG/1
3.12 TABLET ORAL 2 TIMES DAILY WITH MEALS
Qty: 60 TABLET | Refills: 3 | Status: SHIPPED | OUTPATIENT
Start: 2024-02-20 | End: 2024-03-03 | Stop reason: SINTOL

## 2024-02-20 NOTE — PROGRESS NOTES
[facility]  [address] [place] [address]  Phone#  [phone]   Fax#  [phone]  *-*-*-*-*-*-*-*-*-*-*-*-*-*-*-*-*-*-*-*-*-*-*-*-*-*-*-*-*-*-*-*-*-*-*-*-*-*-*-*-*-*-*-*-*-*-*-*-*-*-*-*-*-*                                   Cardiology Follow Up      ENCOUNTER DATE: 24 12:48 PM  PATIENT NAME: Zenia Youssef   : 1945    MRN: [id number]  AGE:79 y.o.      SEX: female  ENCOUNTER PROVIDER:Cosmo King MD     PRIMARY CARE PHYSICIAN: Didi Talley PA-C    ACTIVE DIAGNOSIS THIS VISIT  1. Paroxysmal atrial fibrillation (HCC)  POCT ECG      2. mitral valve prolapse, torn chord, with moderate to severe regurgitation  carvedilol (COREG) 3.125 mg tablet      3. Nonobstructive atherosclerosis of coronary artery        4. Aortic valve sclerosis        5. Hypercholesterolemia        6. Essential hypertension        7. Splenic artery aneurysm (HCC)        8. SOB (shortness of breath) on exertion          ACTIVE PROBLEM LIST  Patient Active Problem List   Diagnosis    Generalized anxiety disorder    Diverticulosis    GERD without esophagitis    Hiatal hernia    Hypercholesterolemia    Essential hypertension    Irritable bowel syndrome    Malignant melanoma of skin (HCC)    Age-related osteoporosis without current pathological fracture    Squamous cell carcinoma of skin    Splenic artery aneurysm (HCC)    Other shoulder lesions, unspecified shoulder    Other specified disorders of rotator cuff syndrome of shoulder and allied disorders    Healthcare maintenance    Hair loss    mitral valve prolapse, torn chord, with moderate to severe regurgitation    SOB (shortness of breath) on exertion    Nonobstructive atherosclerosis of coronary artery    Paroxysmal atrial fibrillation (HCC)    Aortic valve sclerosis    Allergic rhinitis       CARDIOLOGY SPECIALTY COMMENTS  Patient was 1st seen on 2021. Patient is a is a 76-year-old female who developed symptoms of chest tightness  radiating into her neck and jaw with shortness of breath on physical exertion such as going up a flight of stairs.  The discomfort would melba with rest.  Her primary care physician ordered a stress echocardiogram which was nondiagnostic and equivocal for myocardial ischemia.  Mild mitral regurgitation was noted on the echocardiogram.  Patient states that since the stress test, her exertional chest tightness and shortness of breath has been much less severe.  Patient has no family history of coronary artery disease.  A sister has a murmur.  She is a lifetime nonsmoker.  Patient had a melanoma removed 11 years ago and has had no evidence of recurrence.    06/23/2021 nuclear stress test: Resting hypertension with exaggerated hypertensive response to exercise negative treadmill stress test for angina pectoris but with abnormal ST depression with exercise. Exercise induced ventricular ectopy. Normal LV systolic function, EF 77%. Normal tomographic perfusion series.    06/01/2021 echocardiogram:  Normal LV function EF 60%, mild concentric LVH.  Grade 1 diastolic dysfunction.  Mild-to-moderate left atrial enlargement.  Moderate mitral regurgitation.    02/24/2022 cardiac catheterization: Ostial circumflex lesion 50% stenosis.    7/12-7/14/2023  [facility] new onset atrial fibrillation with RVR    7/12/2023 echocardiogram: Normal left ventricular systolic function, EF 60% atrial fibrillation with diastolic dysfunction.  Sigmoid shaped septum.  Severe LA enlargement.  Mild RA dilated.  Aortic sclerosis.  Valve posterior leaflet prolapse with moderate regurgitation.  Mild to moderate tricuspid regurgitation.  PASP 59 mmHg.Z    1/22/2024 DEANGELO: LVEF 60%.  RV normal.  LA and RA dilated.  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      INTERVAL HISTORY:        Patient had a DEANGELO which demonstrated normal left ventricular function but a flail posterior leaflet with prolapse of segments P2 and P3  secondary to torn cords.  I asked Dr. Davidson to look over the DEANGELO and give his impression whether the mitral valve could be repaired.  He felt it could be attempted but that the patient should go into the procedure realizing that she may need a mitral valve replacement.  He said the degree of calcification in the patient's mitral valve annulus would pose a problem with repairing the mitral valve flail leaflet and could be a problem also with mitral valve replacement.  He stated that the patient should be significantly symptomatic to consider this approach.  He did not feel that her age was a deterrent.    Patient is definitely only mildly symptomatic, [place] heart class II she gets short of breath going up a flight of stairs but not particularly while doing the stairs which she frequently goes up at a brisk pace but she notices the shortness of breath when she gets to the top.  However, she does not need to stop and rest.  She can go about her business while she is getting her breath back.  She also notices that when she walks a dog, she gets more short of breath than what she used to.  However she needs never needs to stop although at times she needs to have the dog for slow down.  He especially knee to slow down when going up hills.    Patient abdominal discomfort and weight loss have resolved since she stopped taking losartan.  She is actually started to gain some weight back    DISCUSSION/PLAN:          Discontinue bisoprolol  Begin carvedilol 3.125 mg with breakfast and dinner  Return in 2 months  Will try to titrate up carvedilol at the 2-month.  Encourage patient that if she gets fatigued or sleepy on the medication to stay with that and that it will pass become more tolerant.    Lab Studies:    Lab Results   Component Value Date    CHOLESTEROL 132 10/03/2023    CHOLESTEROL 124 03/23/2023    CHOLESTEROL 129 09/20/2022     Lab Results   Component Value Date    TRIG 78 10/03/2023    TRIG 61 03/23/2023     TRIG 62 09/20/2022     Lab Results   Component Value Date    HDL 48 (L) 10/03/2023    HDL 49 (L) 03/23/2023    HDL 53 09/20/2022     Lab Results   Component Value Date    LDLCALC 68 10/03/2023    LDLCALC 63 03/23/2023    LDLCALC 64 09/20/2022       Lab Results   Component Value Date    LDLDIRECT 112 05/07/2014       Lab Results   Component Value Date    EGFR 69 10/03/2023    EGFR 66 07/20/2023    EGFR 71 07/14/2023    SODIUM 139 10/03/2023    SODIUM 140 07/20/2023    SODIUM 139 07/14/2023    K 4.2 10/03/2023    K 4.2 07/20/2023    K 3.9 07/14/2023     10/03/2023     07/20/2023     (H) 07/14/2023    CO2 28 10/03/2023    CO2 27 07/20/2023    CO2 25 07/14/2023    ANIONGAP 5 05/07/2014    BUN 26 (H) 10/03/2023    BUN 22 07/20/2023    BUN 22 07/14/2023    CREATININE 0.82 10/03/2023    CREATININE 0.85 07/20/2023    CREATININE 0.80 07/14/2023     Lab Results   Component Value Date    WBC 7.16 10/03/2023    WBC 6.71 07/20/2023    WBC 8.03 07/14/2023    HGB 14.0 10/03/2023    HGB 12.9 07/20/2023    HGB 12.8 07/14/2023    HCT 42.3 10/03/2023    HCT 39.8 07/20/2023    HCT 39.5 07/14/2023    MCV 91 10/03/2023    MCV 92 07/20/2023    MCV 92 07/14/2023    MCH 30.2 10/03/2023    MCH 29.9 07/20/2023    MCH 29.8 07/14/2023    MCHC 33.1 10/03/2023    MCHC 32.4 07/20/2023    MCHC 32.4 07/14/2023     10/03/2023     07/20/2023     07/14/2023      Lab Results   Component Value Date    GLUCOSE 98 05/07/2014    CALCIUM 9.4 10/03/2023    CALCIUM 9.3 07/20/2023    CALCIUM 9.1 07/14/2023    AST 18 10/03/2023    AST 26 07/12/2023    AST 27 03/23/2023    ALT 28 10/03/2023    ALT 33 07/12/2023    ALT 49 03/23/2023    ALKPHOS 76 10/03/2023    ALKPHOS 88 07/12/2023    ALKPHOS 82 03/23/2023    PROT 7.4 05/07/2014    BILITOT 0.37 05/07/2014     Results for orders placed or performed in visit on 02/20/24   POCT ECG    Narrative    Normal sinus rhythm at a rate of 63 bpm.  Frequent premature ventricular  contractions.  Otherwise normal EKG.         Current Outpatient Medications:     ALPRAZolam (XANAX) 0.25 mg tablet, Take 1 tablet (0.25 mg total) by mouth daily as needed for anxiety, Disp: 30 tablet, Rfl: 0    atorvastatin (LIPITOR) 20 mg tablet, Take 1 tablet (20 mg total) by mouth daily, Disp: 90 tablet, Rfl: 3    b complex vitamins capsule, Take 1 capsule by mouth daily, Disp: , Rfl:     BIOTIN PO, Take by mouth, Disp: , Rfl:     calcium carbonate (OS-BRYCE) 600 MG tablet, Take 600 mg by mouth 2 (two) times a day with meals, Disp: , Rfl:     carvedilol (COREG) 3.125 mg tablet, Take 1 tablet (3.125 mg total) by mouth 2 (two) times a day with meals, Disp: 60 tablet, Rfl: 3    Cholecalciferol (VITAMIN D3) 1000 units CAPS, Take 2 capsules by mouth daily, Disp: , Rfl:     diltiazem (CARDIZEM CD) 240 mg 24 hr capsule, Take 1 capsule (240 mg total) by mouth daily, Disp: 30 capsule, Rfl: 5    Lactobacillus Rhamnosus, GG, (CULTURELLE PO), Take by mouth daily, Disp: , Rfl:     rivaroxaban (Xarelto) 20 mg tablet, Take 1 tablet (20 mg total) by mouth daily with breakfast, Disp: 30 tablet, Rfl: 5    Vitamin Mixture ([place]-C) 500-60 MG TABS, Take by mouth, Disp: , Rfl:   Allergies   Allergen Reactions    Losartan Abdominal Pain     Recent demonstrates that this side effect will be experienced by the entire class of drugs.    Macrobid [Nitrofurantoin] GI Intolerance    Penicillins Other (See Comments)     Childhood. Per pt, unsure of reaction    Protonix [Pantoprazole] Nausea Only    Wound Dressing Adhesive Other (See Comments)     bandaid if on for more than 24 hours       Past Medical History:   Diagnosis Date    Anxiety     Diverticulosis     GERD (gastroesophageal reflux disease)     Hiatal hernia     Hypertension     Melanoma (HCC)     Osteoporosis      Social History     Socioeconomic History    Marital status: /Civil Union     Spouse name: Not on file    Number of children: Not on file    Years of education: Not on  file    Highest education level: Not on file   Occupational History    Occupation: retired - [employer]   Tobacco Use    Smoking status: Never    Smokeless tobacco: Never    Tobacco comments:     No secondhand smoke exposure    Vaping Use    Vaping status: Never Used   Substance and Sexual Activity    Alcohol use: Not Currently     Comment: very rare socially    Drug use: Not on file    Sexual activity: Not on file   Other Topics Concern    Not on file   Social History Narrative    Not on file     Social Determinants of Health     Financial Resource Strain: Low Risk  (4/10/2023)    Overall Financial Resource Strain (CARDIA)     Difficulty of Paying Living Expenses: Not hard at all   Food Insecurity: Not on file   Transportation Needs: No Transportation Needs (4/10/2023)    PRAPARE - Transportation     Lack of Transportation (Medical): No     Lack of Transportation (Non-Medical): No   Physical Activity: Not on file   Stress: Not on file   Social Connections: Not on file   Intimate Partner Violence: Not on file   Housing Stability: Not on file      Family History   Problem Relation Age of Onset    Uterine cancer Mother     Prostate cancer Father     Osteoporosis Sister      Past Surgical History:   Procedure Laterality Date    CARDIAC CATHETERIZATION N/A 02/24/2022    Procedure: Cardiac catheterization;  Surgeon: Harinder Calvo MD;  Location: [facility];  Service: Cardiology    CARDIAC CATHETERIZATION N/A 02/24/2022    Procedure: Cardiac Coronary Angiogram;  Surgeon: Harinder Calvo MD;  Location: [facility];  Service: Cardiology    EGD  08/24/2021    JUAN Miller MD.- Normal duodenum; normal stomach; non-severe esophagitis. Bx: Neg. H. pylori, ulceration and dysplasia.    EGD AND COLONOSCOPY  05/14/2014    NORMAL/HP       PREVIOUS WEIGHTS:   Wt Readings from Last 10 Encounters:   02/20/24 56.9 kg (125 lb 6.4 oz)   02/06/24 54.9 kg (121 lb)   01/22/24 56.7 kg (125 lb)   01/09/24  56.9 kg (125 lb 6.4 oz)   01/04/24 57.2 kg (126 lb)   11/27/23 57.6 kg (127 lb)   11/06/23 57.4 kg (126 lb 9.6 oz)   10/16/23 57.7 kg (127 lb 3.2 oz)   10/05/23 57.2 kg (126 lb)   08/15/23 59 kg (130 lb)        Review of Systems:  Review of Systems   Respiratory:  Positive for shortness of breath. Negative for cough, choking, chest tightness and wheezing.    Cardiovascular:  Negative for chest pain, palpitations and leg swelling.   Musculoskeletal:  Negative for gait problem.   Skin:  Negative for rash.   Neurological:  Negative for dizziness, tremors, syncope, weakness, light-headedness, numbness and headaches.   Psychiatric/Behavioral:  Negative for agitation and behavioral problems. The patient is not hyperactive.        Physical Exam:  /54 (BP Location: Right arm, Patient Position: Sitting, Cuff Size: Standard)   Pulse 63   Wt 56.9 kg (125 lb 6.4 oz)   BMI 21.69 kg/m²     Physical Exam  Constitutional:       General: She is not in acute distress.     Appearance: She is well-developed.   HENT:      Head: Normocephalic and atraumatic.   Neck:      Thyroid: No thyromegaly.      Vascular: No carotid bruit or JVD.      Trachea: No tracheal deviation.   Cardiovascular:      Rate and Rhythm: Normal rate and regular rhythm.      Pulses: Normal pulses.      Heart sounds: Murmur heard.      Systolic murmur is present with a grade of 3/6.      No friction rub. No gallop.   Pulmonary:      Effort: Pulmonary effort is normal. No respiratory distress.      Breath sounds: Normal breath sounds. No wheezing, rhonchi or rales.   Chest:      Chest wall: No tenderness.   Musculoskeletal:         General: Normal range of motion.      Cervical back: Normal range of motion and neck supple.      Right lower leg: No edema.      Left lower leg: No edema.   Skin:     General: Skin is warm and dry.   Neurological:      General: No focal deficit present.      Mental Status: She is alert and oriented to person, place, and time.       "Gait: Gait normal.   Psychiatric:         Mood and Affect: Mood normal.         Behavior: Behavior normal.         Thought Content: Thought content normal.         Judgment: Judgment normal.       ======================================================  Imaging:   I have personally reviewed pertinent reports.      Portions of the record may have been created with voice recognition software. Occasional wrong word or \"sound a like\" substitutions may have occurred due to the inherent limitations of voice recognition software. Read the chart carefully and recognize, using context, where substitutions have occurred.    SIGNATURES:   Cosmo King MD   "

## 2024-02-21 PROBLEM — Z00.00 HEALTHCARE MAINTENANCE: Status: RESOLVED | Noted: 2020-03-04 | Resolved: 2024-02-21

## 2024-02-22 ENCOUNTER — TELEPHONE (OUTPATIENT)
Dept: CARDIOLOGY CLINIC | Facility: CLINIC | Age: 79
End: 2024-02-22

## 2024-02-22 NOTE — TELEPHONE ENCOUNTER
MD Cosmo Whittington MD John,    While there is some potential for a valve repair, I think she'd have to be prepared for a replacement.  Her valve and annulus are quite calcified which would contribute to more of a challenge with leaflet repair and annuloplasty.  Additionally, I think there are mixed etiologies of her regurgitation that could make repair challenging.    If she is symptomatic though, a replacement will help her.  And at her age, a replacement isn't a bad thing.    Hope this helps.    Fredy

## 2024-02-26 ENCOUNTER — TELEPHONE (OUTPATIENT)
Dept: CARDIOLOGY CLINIC | Facility: CLINIC | Age: 79
End: 2024-02-26

## 2024-02-26 NOTE — TELEPHONE ENCOUNTER
Patient calling leaving a message on triage line that the Carvedilol is making dizzy and she is very SOB, worse than without the medication.  She states she cannot drive taking this. Please advise.    [phone]

## 2024-02-28 NOTE — TELEPHONE ENCOUNTER
Patient is now having issues with myalgias and stomach.  She is going to stop it until she hears from Dr King.  Too many side affects she states.

## 2024-03-03 DIAGNOSIS — I48.0 PAF (PAROXYSMAL ATRIAL FIBRILLATION) (HCC): Primary | ICD-10-CM

## 2024-03-03 RX ORDER — BISOPROLOL FUMARATE 5 MG/1
5 TABLET, FILM COATED ORAL DAILY
Qty: 30 TABLET | Refills: 5 | Status: SHIPPED | OUTPATIENT
Start: 2024-03-03

## 2024-04-01 DIAGNOSIS — I48.0 PAF (PAROXYSMAL ATRIAL FIBRILLATION) (HCC): ICD-10-CM

## 2024-04-01 DIAGNOSIS — I48.91 ATRIAL FIBRILLATION WITH RAPID VENTRICULAR RESPONSE (HCC): ICD-10-CM

## 2024-04-01 DIAGNOSIS — I48.91 NEW ONSET ATRIAL FIBRILLATION (HCC): ICD-10-CM

## 2024-04-01 RX ORDER — BISOPROLOL FUMARATE 5 MG/1
5 TABLET, FILM COATED ORAL DAILY
Qty: 90 TABLET | Refills: 2 | Status: SHIPPED | OUTPATIENT
Start: 2024-04-01

## 2024-04-01 RX ORDER — DILTIAZEM HYDROCHLORIDE 240 MG/1
240 CAPSULE, COATED, EXTENDED RELEASE ORAL DAILY
Qty: 90 CAPSULE | Refills: 2 | Status: SHIPPED | OUTPATIENT
Start: 2024-04-01

## 2024-04-01 NOTE — TELEPHONE ENCOUNTER
Office received fax from [facility] stating that they need the prescription for bisoprolol to change from a 30 day qty to a 90 day qty.

## 2024-04-11 ENCOUNTER — APPOINTMENT (OUTPATIENT)
Dept: LAB | Facility: CLINIC | Age: 79
End: 2024-04-11
Payer: MEDICARE

## 2024-04-11 DIAGNOSIS — E78.00 HYPERCHOLESTEROLEMIA: ICD-10-CM

## 2024-04-11 LAB
ALBUMIN SERPL BCP-MCNC: 4.1 G/DL (ref 3.5–5)
ALP SERPL-CCNC: 80 U/L (ref 34–104)
ALT SERPL W P-5'-P-CCNC: 26 U/L (ref 7–52)
ANION GAP SERPL CALCULATED.3IONS-SCNC: 10 MMOL/L (ref 4–13)
AST SERPL W P-5'-P-CCNC: 23 U/L (ref 13–39)
BILIRUB SERPL-MCNC: 0.49 MG/DL (ref 0.2–1)
BUN SERPL-MCNC: 26 MG/DL (ref 5–25)
CALCIUM SERPL-MCNC: 9.3 MG/DL (ref 8.4–10.2)
CHLORIDE SERPL-SCNC: 104 MMOL/L (ref 96–108)
CHOLEST SERPL-MCNC: 140 MG/DL
CO2 SERPL-SCNC: 28 MMOL/L (ref 21–32)
CREAT SERPL-MCNC: 0.72 MG/DL (ref 0.6–1.3)
GFR SERPL CREATININE-BSD FRML MDRD: 81 ML/MIN/1.73SQ M
GLUCOSE P FAST SERPL-MCNC: 95 MG/DL (ref 65–99)
HDLC SERPL-MCNC: 47 MG/DL
LDLC SERPL CALC-MCNC: 79 MG/DL (ref 0–100)
POTASSIUM SERPL-SCNC: 4.1 MMOL/L (ref 3.5–5.3)
PROT SERPL-MCNC: 7.4 G/DL (ref 6.4–8.4)
SODIUM SERPL-SCNC: 142 MMOL/L (ref 135–147)
TRIGL SERPL-MCNC: 72 MG/DL

## 2024-04-11 PROCEDURE — 80053 COMPREHEN METABOLIC PANEL: CPT

## 2024-04-11 PROCEDURE — 80061 LIPID PANEL: CPT

## 2024-04-11 PROCEDURE — 36415 COLL VENOUS BLD VENIPUNCTURE: CPT

## 2024-04-20 NOTE — ASSESSMENT & PLAN NOTE
Atrial fibrillation first noted 7/12/2023 when hospitalized with atrial fibrillation with RVR    Xarelto 20 mg daily  Diltiazem  mg daily  Bisoprolol 5 mg daily

## 2024-04-20 NOTE — ASSESSMENT & PLAN NOTE
Case reviewed with Dr Davidson who feels that the valve may need to be replaced. I do not feel patient is symptomatic enough to warrant replacement    Not on ARB due to abdominal pain which Dr. Miller felt was from the losartan     Tried placing patient on carvedilol 3.125 mg 2 times a day and she called stating it is making dizzy and she is very SOB, worse than without the medication. She states she cannot drive taking this.  Coreg was discontinued.

## 2024-04-20 NOTE — ASSESSMENT & PLAN NOTE
Lab Results   Component Value Date    CHOLESTEROL 140 04/11/2024     Lab Results   Component Value Date    TRIG 72 04/11/2024     Lab Results   Component Value Date    HDL 47 (L) 04/11/2024     Lab Results   Component Value Date    LDLCALC 79 04/11/2024     Lab Results   Component Value Date    LDLDIRECT 112 05/07/2014     Atorvastatin 20 mg daily

## 2024-04-20 NOTE — ASSESSMENT & PLAN NOTE
2/6/2024 118/56  2/20/2024 134/54  4/10/2024 144/66    Bisoprolol 5 mg daily  Diltiazem  mg daily  Not on ARB due to abdominal pain which Dr. Miller felt was from the losartan

## 2024-04-22 ENCOUNTER — OFFICE VISIT (OUTPATIENT)
Dept: CARDIOLOGY CLINIC | Facility: CLINIC | Age: 79
End: 2024-04-22
Payer: MEDICARE

## 2024-04-22 VITALS
WEIGHT: 126.2 LBS | BODY MASS INDEX: 21.83 KG/M2 | HEART RATE: 64 BPM | SYSTOLIC BLOOD PRESSURE: 140 MMHG | DIASTOLIC BLOOD PRESSURE: 50 MMHG

## 2024-04-22 DIAGNOSIS — I48.0 PAROXYSMAL ATRIAL FIBRILLATION (HCC): Primary | ICD-10-CM

## 2024-04-22 DIAGNOSIS — E78.00 HYPERCHOLESTEROLEMIA: ICD-10-CM

## 2024-04-22 DIAGNOSIS — I25.10 NONOBSTRUCTIVE ATHEROSCLEROSIS OF CORONARY ARTERY: ICD-10-CM

## 2024-04-22 DIAGNOSIS — I35.8 AORTIC VALVE SCLEROSIS: ICD-10-CM

## 2024-04-22 DIAGNOSIS — I34.0 NONRHEUMATIC MITRAL VALVE REGURGITATION: ICD-10-CM

## 2024-04-22 DIAGNOSIS — I10 ESSENTIAL HYPERTENSION: ICD-10-CM

## 2024-04-22 PROCEDURE — 93000 ELECTROCARDIOGRAM COMPLETE: CPT | Performed by: INTERNAL MEDICINE

## 2024-04-22 PROCEDURE — 99215 OFFICE O/P EST HI 40 MIN: CPT | Performed by: INTERNAL MEDICINE

## 2024-04-22 RX ORDER — LISINOPRIL 5 MG/1
5 TABLET ORAL DAILY
Qty: 30 TABLET | Refills: 5 | Status: SHIPPED | OUTPATIENT
Start: 2024-04-22

## 2024-04-22 NOTE — PROGRESS NOTES
[facility]  [address] [place] [address]  Phone#  [phone]   Fax#  [phone]  *-*-*-*-*-*-*-*-*-*-*-*-*-*-*-*-*-*-*-*-*-*-*-*-*-*-*-*-*-*-*-*-*-*-*-*-*-*-*-*-*-*-*-*-*-*-*-*-*-*-*-*-*-*                                   Cardiology Follow Up      ENCOUNTER DATE: 24 10:12 AM  PATIENT NAME: Zenia Youssef   : 1945    MRN: [id number]  AGE:79 y.o.      SEX: female  ENCOUNTER PROVIDER:Cosmo King MD     PRIMARY CARE PHYSICIAN: Didi Talley PA-C    CARDIOLOGY SPECIALTY COMMENTS  Patient was 1st seen on 2021. Patient is a is a 76-year-old female who developed symptoms of chest tightness radiating into her neck and jaw with shortness of breath on physical exertion such as going up a flight of stairs.  The discomfort would melba with rest.  Her primary care physician ordered a stress echocardiogram which was nondiagnostic and equivocal for myocardial ischemia.  Mild mitral regurgitation was noted on the echocardiogram.  Patient states that since the stress test, her exertional chest tightness and shortness of breath has been much less severe.  Patient has no family history of coronary artery disease.  A sister has a murmur.  She is a lifetime nonsmoker.  Patient had a melanoma removed 11 years ago and has had no evidence of recurrence.    2021 nuclear stress test: Resting hypertension with exaggerated hypertensive response to exercise negative treadmill stress test for angina pectoris but with abnormal ST depression with exercise. Exercise induced ventricular ectopy. Normal LV systolic function, EF 77%. Normal tomographic perfusion series.    2021 echocardiogram:  Normal LV function EF 60%, mild concentric LVH.  Grade 1 diastolic dysfunction.  Mild-to-moderate left atrial enlargement.  Moderate mitral regurgitation.    2022 cardiac catheterization: Ostial circumflex lesion 50% stenosis.    -2023 [facility] new onset atrial  fibrillation with RVR    7/12/2023 echocardiogram: Normal left ventricular systolic function, EF 60% atrial fibrillation with diastolic dysfunction.  Sigmoid shaped septum.  Severe LA enlargement.  Mild RA dilated.  Aortic sclerosis.  Valve posterior leaflet prolapse with moderate regurgitation.  Mild to moderate tricuspid regurgitation.  PASP 59 mmHg.Z    1/22/2024 [facility]: LVEF 60%.  RV normal.  LA and RA dilated.  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      ACTIVE PROBLEM LIST  Patient Active Problem List   Diagnosis    Generalized anxiety disorder    Diverticulosis    GERD without esophagitis    Hiatal hernia    Hypercholesterolemia    Essential hypertension    Irritable bowel syndrome    Malignant melanoma of skin (HCC)    Age-related osteoporosis without current pathological fracture    Squamous cell carcinoma of skin    Splenic artery aneurysm (HCC)    Other shoulder lesions, unspecified shoulder    Other specified disorders of rotator cuff syndrome of shoulder and allied disorders    Hair loss    mitral valve prolapse, torn chord, with moderate to severe regurgitation    SOB (shortness of breath) on exertion    Nonobstructive atherosclerosis of coronary artery    Paroxysmal atrial fibrillation (HCC)    Aortic valve sclerosis    Allergic rhinitis       ACTIVE DIAGNOSIS AND PLAN    1. Paroxysmal atrial fibrillation (HCC)  Assessment & Plan:  Atrial fibrillation first noted 7/12/2023 when hospitalized with atrial fibrillation with RVR    Xarelto 20 mg daily  Diltiazem  mg daily  Bisoprolol 5 mg daily    Orders:  -     POCT ECG  -     lisinopril (ZESTRIL) 5 mg tablet; Take 1 tablet (5 mg total) by mouth daily    2. Nonobstructive atherosclerosis of coronary artery  Assessment & Plan:  02/24/2022 cardiac catheterization: Ostial circumflex lesion 50% stenosis.     Orders:  -     lisinopril (ZESTRIL) 5 mg tablet; Take 1 tablet (5 mg total) by mouth daily    3. mitral valve  prolapse, torn chord, with moderate to severe regurgitation  Assessment & Plan:  Case reviewed with Dr Davidson who feels that the valve may need to be replaced. I do not feel patient is symptomatic enough to warrant replacement    Not on ARB due to abdominal pain which Dr. Miller felt was from the losartan     Tried placing patient on carvedilol 3.125 mg 2 times a day and she called stating it is making dizzy and she is very SOB, worse than without the medication. She states she cannot drive taking this.  Coreg was discontinued.    Orders:  -     lisinopril (ZESTRIL) 5 mg tablet; Take 1 tablet (5 mg total) by mouth daily    4. Hypercholesterolemia  Assessment & Plan:  Lab Results   Component Value Date    CHOLESTEROL 140 04/11/2024     Lab Results   Component Value Date    TRIG 72 04/11/2024     Lab Results   Component Value Date    HDL 47 (L) 04/11/2024     Lab Results   Component Value Date    LDLCALC 79 04/11/2024     Lab Results   Component Value Date    LDLDIRECT 112 05/07/2014     Atorvastatin 20 mg daily      5. Aortic valve sclerosis  Assessment & Plan:  Murmur noted on exam and aortic sclerosis noted on echo      6. Essential hypertension  Assessment & Plan:  2/6/2024 118/56  2/20/2024 134/54  4/10/2024 144/66    Bisoprolol 5 mg daily  Diltiazem  mg daily  Not on ARB due to abdominal pain which Dr. Miller felt was from the losartan         INTERVAL HISTORY:        Patient feels about the same.  She was at a dance and was dancing with her .  She had to stop in the middle of the evening and rest and then could get back on the floor.  A year ago she had no difficulty with dancing.  She finds that she gets short of breath when she goes up the stairs.  However she can go up 1 flight and go on without sitting to rest.  She does feel short of breath.    She is concerned that Dr. Davidson's opinion was patient probably could not have a mitral valve repair.  Her calcified annulus could even pose  a problem with mitral valve replacement.    DISCUSSION/PLAN:          Tried to explain to patient that she is not symptomatic enough to warrant mitral valve replacement especially since it appears that it may be high risk surgery.  Begin lisinopril 5 mg daily  Return in 4 months.    Lab Studies:    Lab Results   Component Value Date    CHOLESTEROL 140 04/11/2024    CHOLESTEROL 132 10/03/2023    CHOLESTEROL 124 03/23/2023     Lab Results   Component Value Date    TRIG 72 04/11/2024    TRIG 78 10/03/2023    TRIG 61 03/23/2023     Lab Results   Component Value Date    HDL 47 (L) 04/11/2024    HDL 48 (L) 10/03/2023    HDL 49 (L) 03/23/2023     Lab Results   Component Value Date    LDLCALC 79 04/11/2024    LDLCALC 68 10/03/2023    LDLCALC 63 03/23/2023       Lab Results   Component Value Date    LDLDIRECT 112 05/07/2014         Lab Results   Component Value Date    EGFR 81 04/11/2024    EGFR 69 10/03/2023    EGFR 66 07/20/2023    SODIUM 142 04/11/2024    SODIUM 139 10/03/2023    SODIUM 140 07/20/2023    K 4.1 04/11/2024    K 4.2 10/03/2023    K 4.2 07/20/2023     04/11/2024     10/03/2023     07/20/2023    CO2 28 04/11/2024    CO2 28 10/03/2023    CO2 27 07/20/2023    ANIONGAP 5 05/07/2014    BUN 26 (H) 04/11/2024    BUN 26 (H) 10/03/2023    BUN 22 07/20/2023    CREATININE 0.72 04/11/2024    CREATININE 0.82 10/03/2023    CREATININE 0.85 07/20/2023     Lab Results   Component Value Date    WBC 7.16 10/03/2023    WBC 6.71 07/20/2023    WBC 8.03 07/14/2023    HGB 14.0 10/03/2023    HGB 12.9 07/20/2023    HGB 12.8 07/14/2023    HCT 42.3 10/03/2023    HCT 39.8 07/20/2023    HCT 39.5 07/14/2023    MCV 91 10/03/2023    MCV 92 07/20/2023    MCV 92 07/14/2023    MCH 30.2 10/03/2023    MCH 29.9 07/20/2023    MCH 29.8 07/14/2023    MCHC 33.1 10/03/2023    MCHC 32.4 07/20/2023    MCHC 32.4 07/14/2023     10/03/2023     07/20/2023     07/14/2023      Lab Results   Component Value Date    GLUCOSE  98 05/07/2014    CALCIUM 9.3 04/11/2024    CALCIUM 9.4 10/03/2023    CALCIUM 9.3 07/20/2023    ALB 4.1 04/11/2024    ALB 4.2 10/03/2023    ALB 4.5 07/12/2023    TP 7.4 04/11/2024    TP 7.2 10/03/2023    TP 7.8 07/12/2023    AST 23 04/11/2024    AST 18 10/03/2023    AST 26 07/12/2023    ALT 26 04/11/2024    ALT 28 10/03/2023    ALT 33 07/12/2023    ALKPHOS 80 04/11/2024    ALKPHOS 76 10/03/2023    ALKPHOS 88 07/12/2023       Lab Results   Component Value Date     (H) 07/12/2023       Lab Results   Component Value Date     (H) 07/12/2023       Results for orders placed or performed in visit on 04/22/24   POCT ECG    Narrative    Normal sinus rhythm with occasional premature ventricular contractions at a rate of 63 bpm.  Otherwise normal EKG.         Current Outpatient Medications:     ALPRAZolam (XANAX) 0.25 mg tablet, Take 1 tablet (0.25 mg total) by mouth daily as needed for anxiety, Disp: 30 tablet, Rfl: 0    atorvastatin (LIPITOR) 20 mg tablet, Take 1 tablet (20 mg total) by mouth daily, Disp: 90 tablet, Rfl: 3    BIOTIN PO, Take by mouth, Disp: , Rfl:     bisoprolol (ZEBETA) 5 mg tablet, Take 1 tablet (5 mg total) by mouth daily, Disp: 90 tablet, Rfl: 2    calcium carbonate (OS-BRYCE) 600 MG tablet, Take 600 mg by mouth 2 (two) times a day with meals, Disp: , Rfl:     Cholecalciferol (VITAMIN D3) 1000 units CAPS, Take 2 capsules by mouth daily, Disp: , Rfl:     diltiazem (CARDIZEM CD) 240 mg 24 hr capsule, Take 1 capsule (240 mg total) by mouth daily, Disp: 90 capsule, Rfl: 2    Lactobacillus Rhamnosus, GG, (CULTURELLE PO), Take by mouth daily, Disp: , Rfl:     lisinopril (ZESTRIL) 5 mg tablet, Take 1 tablet (5 mg total) by mouth daily, Disp: 30 tablet, Rfl: 5    melatonin 1 mg, Take 1 mg by mouth daily at bedtime as needed, Disp: , Rfl:     rivaroxaban (Xarelto) 20 mg tablet, Take 1 tablet (20 mg total) by mouth daily with breakfast, Disp: 30 tablet, Rfl: 5    Vitamin Mixture ([place]-C) 500-60 MG  TABS, Take by mouth, Disp: , Rfl:     b complex vitamins capsule, Take 1 capsule by mouth daily (Patient not taking: Reported on 4/22/2024), Disp: , Rfl:   Allergies   Allergen Reactions    Losartan Abdominal Pain     Recent demonstrates that this side effect will be experienced by the entire class of drugs.    Macrobid [Nitrofurantoin] GI Intolerance    Penicillins Other (See Comments)     Childhood. Per pt, unsure of reaction    Protonix [Pantoprazole] Nausea Only    Wound Dressing Adhesive Other (See Comments)     bandaid if on for more than 24 hours       Past Medical History:   Diagnosis Date    Anxiety     Diverticulosis     GERD (gastroesophageal reflux disease)     Hiatal hernia     Hypertension     Melanoma (HCC)     Osteoporosis      Social History     Socioeconomic History    Marital status: /Civil Union     Spouse name: Not on file    Number of children: Not on file    Years of education: Not on file    Highest education level: Not on file   Occupational History    Occupation: retired - [employer]    Tobacco Use    Smoking status: Never    Smokeless tobacco: Never    Tobacco comments:     No secondhand smoke exposure    Vaping Use    Vaping status: Never Used   Substance and Sexual Activity    Alcohol use: Not Currently     Comment: very rare socially    Drug use: Not on file    Sexual activity: Not on file   Other Topics Concern    Not on file   Social History Narrative    Not on file     Social Determinants of Health     Financial Resource Strain: Low Risk  (4/10/2023)    Overall Financial Resource Strain (CARDIA)     Difficulty of Paying Living Expenses: Not hard at all   Food Insecurity: Not on file   Transportation Needs: No Transportation Needs (4/10/2023)    PRAPARE - Transportation     Lack of Transportation (Medical): No     Lack of Transportation (Non-Medical): No   Physical Activity: Not on file   Stress: Not on file   Social Connections: Not on file   Intimate Partner  Violence: Not on file   Housing Stability: Not on file      Family History   Problem Relation Age of Onset    Uterine cancer Mother     Prostate cancer Father     Osteoporosis Sister      Past Surgical History:   Procedure Laterality Date    CARDIAC CATHETERIZATION N/A 02/24/2022    Procedure: Cardiac catheterization;  Surgeon: Harinder Calvo MD;  Location: [facility];  Service: Cardiology    CARDIAC CATHETERIZATION N/A 02/24/2022    Procedure: Cardiac Coronary Angiogram;  Surgeon: Harinder Calvo MD;  Location: [facility];  Service: Cardiology    EGD  08/24/2021    [facility]- BARNEY Miller MD.- Normal duodenum; normal stomach; non-severe esophagitis. Bx: Neg. H. pylori, ulceration and dysplasia.    EGD AND COLONOSCOPY  05/14/2014    NORMAL/HP       PREVIOUS WEIGHTS:   Wt Readings from Last 10 Encounters:   04/22/24 57.2 kg (126 lb 3.2 oz)   04/10/24 57.2 kg (126 lb)   02/20/24 56.9 kg (125 lb 6.4 oz)   02/06/24 54.9 kg (121 lb)   01/22/24 56.7 kg (125 lb)   01/09/24 56.9 kg (125 lb 6.4 oz)   01/04/24 57.2 kg (126 lb)   11/27/23 57.6 kg (127 lb)   11/06/23 57.4 kg (126 lb 9.6 oz)   10/16/23 57.7 kg (127 lb 3.2 oz)        Review of Systems:  Review of Systems   Respiratory:  Negative for cough, choking, chest tightness, shortness of breath and wheezing.    Cardiovascular:  Negative for chest pain, palpitations and leg swelling.   Musculoskeletal:  Negative for gait problem.   Skin:  Negative for rash.   Neurological:  Negative for dizziness, tremors, syncope, weakness, light-headedness, numbness and headaches.   Psychiatric/Behavioral:  Negative for agitation and behavioral problems. The patient is not hyperactive.        Physical Exam:  /50 (BP Location: Left arm, Patient Position: Sitting, Cuff Size: Adult)   Pulse 64   Wt 57.2 kg (126 lb 3.2 oz)   BMI 21.83 kg/m²     Physical Exam  Constitutional:       General: She is not in acute distress.     Appearance: She is well-developed.   HENT:       "Head: Normocephalic and atraumatic.   Neck:      Thyroid: No thyromegaly.      Vascular: No carotid bruit or JVD.      Trachea: No tracheal deviation.   Cardiovascular:      Rate and Rhythm: Normal rate and regular rhythm.      Pulses: Normal pulses.      Heart sounds: Normal heart sounds. No murmur heard.     No friction rub. No gallop.   Pulmonary:      Effort: Pulmonary effort is normal. No respiratory distress.      Breath sounds: Normal breath sounds. No wheezing, rhonchi or rales.   Chest:      Chest wall: No tenderness.   Musculoskeletal:         General: Normal range of motion.      Cervical back: Normal range of motion and neck supple.      Right lower leg: No edema.      Left lower leg: No edema.   Skin:     General: Skin is warm and dry.   Neurological:      General: No focal deficit present.      Mental Status: She is alert and oriented to person, place, and time.      Gait: Gait normal.   Psychiatric:         Mood and Affect: Mood normal.         Behavior: Behavior normal.         Thought Content: Thought content normal.         Judgment: Judgment normal.       ======================================================  Imaging:   I have personally reviewed pertinent reports.      Portions of the record may have been created with voice recognition software. Occasional wrong word or \"sound a like\" substitutions may have occurred due to the inherent limitations of voice recognition software. Read the chart carefully and recognize, using context, where substitutions have occurred.    SIGNATURES:   Cosmo King MD   "

## 2024-04-24 ENCOUNTER — OFFICE VISIT (OUTPATIENT)
Dept: FAMILY MEDICINE CLINIC | Facility: CLINIC | Age: 79
End: 2024-04-24
Payer: MEDICARE

## 2024-04-24 VITALS
SYSTOLIC BLOOD PRESSURE: 134 MMHG | HEIGHT: 64 IN | HEART RATE: 64 BPM | BODY MASS INDEX: 21.34 KG/M2 | WEIGHT: 125 LBS | DIASTOLIC BLOOD PRESSURE: 72 MMHG

## 2024-04-24 DIAGNOSIS — I34.0 NONRHEUMATIC MITRAL VALVE REGURGITATION: ICD-10-CM

## 2024-04-24 DIAGNOSIS — K58.9 IRRITABLE BOWEL SYNDROME, UNSPECIFIED TYPE: ICD-10-CM

## 2024-04-24 DIAGNOSIS — I48.0 PAROXYSMAL ATRIAL FIBRILLATION (HCC): ICD-10-CM

## 2024-04-24 DIAGNOSIS — C43.9 MALIGNANT MELANOMA OF SKIN (HCC): ICD-10-CM

## 2024-04-24 DIAGNOSIS — E78.00 HYPERCHOLESTEROLEMIA: ICD-10-CM

## 2024-04-24 DIAGNOSIS — F41.1 GENERALIZED ANXIETY DISORDER: ICD-10-CM

## 2024-04-24 DIAGNOSIS — M81.0 AGE-RELATED OSTEOPOROSIS WITHOUT CURRENT PATHOLOGICAL FRACTURE: ICD-10-CM

## 2024-04-24 DIAGNOSIS — I10 ESSENTIAL HYPERTENSION: Primary | ICD-10-CM

## 2024-04-24 PROBLEM — L65.9 HAIR LOSS: Status: RESOLVED | Noted: 2021-03-17 | Resolved: 2024-04-24

## 2024-04-24 PROBLEM — M75.80 OTHER SHOULDER LESIONS, UNSPECIFIED SHOULDER: Status: RESOLVED | Noted: 2020-03-04 | Resolved: 2024-04-24

## 2024-04-24 PROBLEM — R06.02 SOB (SHORTNESS OF BREATH) ON EXERTION: Status: RESOLVED | Noted: 2021-05-27 | Resolved: 2024-04-24

## 2024-04-24 PROCEDURE — G0439 PPPS, SUBSEQ VISIT: HCPCS | Performed by: PHYSICIAN ASSISTANT

## 2024-04-24 PROCEDURE — 99214 OFFICE O/P EST MOD 30 MIN: CPT | Performed by: PHYSICIAN ASSISTANT

## 2024-04-24 NOTE — PROGRESS NOTES
Assessment and Plan:     Problem List Items Addressed This Visit        Cardiovascular and Mediastinum    Essential hypertension - Primary     Well-controlled today.         Relevant Orders    Comprehensive metabolic panel    CBC and differential    mitral valve prolapse, torn chord, with moderate to severe regurgitation     Pt is following closely with cardio and it is not felt she needs replacement yet and if she does she will need [place] to [place].          Paroxysmal atrial fibrillation (HCC)     Continue with Xarelto and cardiology follow-up.            Digestive    Irritable bowel syndrome     Is following with gastroenterology.            Musculoskeletal and Integument    Malignant melanoma of skin (HCC)     Continue with dermatology follow-up         Age-related osteoporosis without current pathological fracture     DEXA order given but pt does not want to go as will not take any med for this dx.          Relevant Orders    DXA bone density spine hip and pelvis    Vitamin D 25 hydroxy       Behavioral Health    Generalized anxiety disorder     Xanax as needed.            Other    Hypercholesterolemia     Patient is currently taking Lipitor 20 keeping LDL at goal at 79 continue recheck 6 months.         Relevant Orders    Lipid Panel with Direct LDL reflex       Depression Screening and Follow-up Plan: Patient was screened for depression during today's encounter. They screened negative with a PHQ-2 score of 0.      Preventive health issues were discussed with patient, and age appropriate screening tests were ordered as noted in patient's After Visit Summary.  Personalized health advice and appropriate referrals for health education or preventive services given if needed, as noted in patient's After Visit Summary.     History of Present Illness:     Patient presents for a Medicare Wellness Visit      Zenia Youssef is here for chronic conditions f/u. Pt. had labs done prior to today's  visit which included Recent Results (from the past 672 hour(s))  -Comprehensive metabolic panel:   Collection Time: 04/11/24  7:57 AM       Result                      Value             Ref Range           Sodium                      142               135 - 147 mm*       Potassium                   4.1               3.5 - 5.3 mm*       Chloride                    104               96 - 108 mmo*       CO2                         28                21 - 32 mmol*       ANION GAP                   10                4 - 13 mmol/L       BUN                         26 (H)            5 - 25 mg/dL        Creatinine                  0.72              0.60 - 1.30 *       Glucose, Fasting            95                65 - 99 mg/dL       Calcium                     9.3               8.4 - 10.2 m*       AST                         23                13 - 39 U/L         ALT                         26                7 - 52 U/L          Alkaline Phosphatase        80                34 - 104 U/L        Total Protein               7.4               6.4 - 8.4 g/*       Albumin                     4.1               3.5 - 5.0 g/*       Total Bilirubin             0.49              0.20 - 1.00 *       eGFR                        81                ml/min/1.73s*  -Lipid Panel with Direct LDL reflex:   Collection Time: 04/11/24  7:57 AM       Result                      Value             Ref Range           Cholesterol                 140               See Comment *       Triglycerides               72                See Comment *       HDL, Direct                 47 (L)            >=50 mg/dL          LDL Calculated              79                0 - 100 mg/dL         Patient Care Team:  Didi Talley PA-C as PCP - General (Family Medicine)     Review of Systems:     Review of Systems   Constitutional: Negative.    HENT: Negative.     Eyes: Negative.    Respiratory: Negative.     Cardiovascular: Negative.    Gastrointestinal: Negative.     Endocrine: Negative.    Genitourinary: Negative.    Musculoskeletal: Negative.    Skin: Negative.    Allergic/Immunologic: Negative.    Neurological: Negative.    Hematological: Negative.    Psychiatric/Behavioral: Negative.          Problem List:     Patient Active Problem List   Diagnosis   • Generalized anxiety disorder   • Diverticulosis   • GERD without esophagitis   • Hiatal hernia   • Hypercholesterolemia   • Essential hypertension   • Irritable bowel syndrome   • Malignant melanoma of skin (HCC)   • Age-related osteoporosis without current pathological fracture   • Squamous cell carcinoma of skin   • Splenic artery aneurysm (HCC)   • Other specified disorders of rotator cuff syndrome of shoulder and allied disorders   • mitral valve prolapse, torn chord, with moderate to severe regurgitation   • Nonobstructive atherosclerosis of coronary artery   • Paroxysmal atrial fibrillation (HCC)   • Aortic valve sclerosis   • Allergic rhinitis      Past Medical and Surgical History:     Past Medical History:   Diagnosis Date   • Anxiety    • Diverticulosis    • GERD (gastroesophageal reflux disease)    • Heart murmur last year   • Hiatal hernia    • Hypertension    • Melanoma (HCC)    • Osteoporosis      Past Surgical History:   Procedure Laterality Date   • CARDIAC CATHETERIZATION N/A 02/24/2022    Procedure: Cardiac catheterization;  Surgeon: Harinder Calvo MD;  Location: [facility];  Service: Cardiology   • CARDIAC CATHETERIZATION N/A 02/24/2022    Procedure: Cardiac Coronary Angiogram;  Surgeon: Harinder Calvo MD;  Location: [facility];  Service: Cardiology   • [facility]  [facility] Normal duod[facility]; normal stomach; non-severe esophagitis. Bx: Neg. H. pylori, ulceration and dysplasia.   • EGD AND COLONOSCOPY  05/14/2014    NORMAL/[facility]   • HYSTERECTOMY  ?      Family History:     Family History   Problem Relation Age of Onset   • Uterine cancer Mother    • Cancer Mother    •  Prostate cancer Father    • Cancer Father    • Osteoporosis Sister       Social History:     Social History     Socioeconomic History   • Marital status: /Civil Union     Spouse name: None   • Number of children: None   • Years of education: None   • Highest education level: None   Occupational History   • Occupation: retired - [employer]    Tobacco Use   • Smoking status: Never   • Smokeless tobacco: Never   • Tobacco comments:     No secondhand smoke exposure    Vaping Use   • Vaping status: Never Used   Substance and Sexual Activity   • Alcohol use: Not Currently     Comment: very rare socially   • Drug use: Never   • Sexual activity: Not Currently     Partners: Male   Other Topics Concern   • None   Social History Narrative   • None     Social Determinants of Health     Financial Resource Strain: Low Risk  (4/10/2023)    Overall Financial Resource Strain (CARDIA)    • Difficulty of Paying Living Expenses: Not hard at all   Food Insecurity: No Food Insecurity (4/24/2024)    Hunger Vital Sign    • Worried About Running Out of Food in the Last Year: Never true    • Ran Out of Food in the Last Year: Never true   Transportation Needs: No Transportation Needs (4/24/2024)    PRAPARE - Transportation    • Lack of Transportation (Medical): No    • Lack of Transportation (Non-Medical): No   Physical Activity: Not on file   Stress: Not on file   Social Connections: Not on file   Intimate Partner Violence: Not on file   Housing Stability: Low Risk  (4/24/2024)    Housing Stability Vital Sign    • Unable to Pay for Housing in the Last Year: No    • Number of Places Lived in the Last Year: 1    • Unstable Housing in the Last Year: No      Medications and Allergies:     Current Outpatient Medications   Medication Sig Dispense Refill   • ALPRAZolam (XANAX) 0.25 mg tablet Take 1 tablet (0.25 mg total) by mouth daily as needed for anxiety 30 tablet 0   • atorvastatin (LIPITOR) 20 mg tablet Take 1 tablet  (20 mg total) by mouth daily 90 tablet 3   • BIOTIN PO Take by mouth     • bisoprolol (ZEBETA) 5 mg tablet Take 1 tablet (5 mg total) by mouth daily 90 tablet 2   • calcium carbonate (OS-BRYCE) 600 MG tablet Take 600 mg by mouth 2 (two) times a day with meals     • Cholecalciferol (VITAMIN D3) 1000 units CAPS Take 2 capsules by mouth daily     • diltiazem (CARDIZEM CD) 240 mg 24 hr capsule Take 1 capsule (240 mg total) by mouth daily 90 capsule 2   • Lactobacillus Rhamnosus, GG, (CULTURELLE PO) Take by mouth daily     • lisinopril (ZESTRIL) 5 mg tablet Take 1 tablet (5 mg total) by mouth daily 30 tablet 5   • melatonin 1 mg Take 1 mg by mouth daily at bedtime as needed     • rivaroxaban (Xarelto) 20 mg tablet Take 1 tablet (20 mg total) by mouth daily with breakfast 30 tablet 5   • Vitamin Mixture ([place]-C) 500-60 MG TABS Take by mouth     • b complex vitamins capsule Take 1 capsule by mouth daily (Patient not taking: Reported on 4/22/2024)       No current facility-administered medications for this visit.     Allergies   Allergen Reactions   • Losartan Abdominal Pain     Recent demonstrates that this side effect will be experienced by the entire class of drugs.   • Macrobid [Nitrofurantoin] GI Intolerance   • Penicillins Other (See Comments)     Childhood. Per pt, unsure of reaction   • Protonix [Pantoprazole] Nausea Only   • Wound Dressing Adhesive Other (See Comments)     bandaid if on for more than 24 hours      Immunizations:     Immunization History   Administered Date(s) Administered   • COVID-19 PFIZER VACCINE 0.3 ML IM 03/11/2021, 04/01/2021, 10/18/2021   • COVID-19 Pfizer Vac BIVALENT Lio-sucrose 12 Yr+ IM 10/31/2022   • COVID-19 Pfizer mRNA vacc PF lio-sucrose 12 yr and older (Comirnaty) 11/02/2023   • COVID-19 Pfizer vac (Lio-sucrose, gray cap) 12 yr+ IM 05/19/2022   • INFLUENZA 10/06/2021, 10/03/2022, 09/28/2023   • Influenza Split High Dose Preservative Free IM 10/06/2014, 10/01/2015, 10/05/2016,  10/12/2017, 10/18/2019   • Influenza, Seasonal Vaccine, Quadrivalent, Adjuvanted, .5e 09/28/2023   • Influenza, high dose seasonal 0.7 mL 09/27/2018, 10/08/2020, 10/03/2022   • Influenza, seasonal, injectable 1945, 11/05/2012, 10/31/2013   • Pneumococcal Conjugate 13-Valent 09/27/2018   • Pneumococcal Polysaccharide PPV23 03/04/2020   • Zoster 11/23/2014, 03/03/2023, 06/07/2023   • Zoster Vaccine Recombinant 11/23/2014, 03/03/2023, 06/20/2023      Health Maintenance:         Topic Date Due   • Breast Cancer Screening: Mammogram  08/02/2024   • Hepatitis C Screening  Completed   • Colorectal Cancer Screening  Discontinued         Topic Date Due   • COVID-19 Vaccine (7 - 2023-24 season) 12/28/2023      Medicare Screening Tests and Risk Assessments:     Zenia is here for her Subsequent Wellness visit.     Health Risk Assessment:   Patient rates overall health as very good. Patient feels that their physical health rating is same. Patient is very satisfied with their life. Eyesight was rated as same. Hearing was rated as same. Patient feels that their emotional and mental health rating is same. Patients states they are sometimes angry. Patient states they are never, rarely unusually tired/fatigued. Pain experienced in the last 7 days has been none. Patient states that she has experienced no weight loss or gain in last 6 months.     Depression Screening:   PHQ-2 Score: 0      Fall Risk Screening:   In the past year, patient has experienced: no history of falling in past year      Urinary Incontinence Screening:   Patient has not leaked urine accidently in the last six months.     Home Safety:  Patient does not have trouble with stairs inside or outside of their home. Patient has working smoke alarms and has no working carbon monoxide detector. Home safety hazards include: none.     Nutrition:   Current diet is Low Cholesterol, Low Saturated Fat, Low Carb, No Added Salt and Limited junk food.     Medications:    Patient is currently taking over-the-counter supplements. OTC medications include: see medication list. Patient is able to manage medications.     Activities of Daily Living (ADLs)/Instrumental Activities of Daily Living (IADLs):   Walk and transfer into and out of bed and chair?: Yes  Dress and groom yourself?: Yes    Bathe or shower yourself?: Yes    Feed yourself? Yes  Do your laundry/housekeeping?: Yes  Manage your money, pay your bills and track your expenses?: Yes  Make your own meals?: Yes    Do your own shopping?: Yes    Previous Hospitalizations:   Any hospitalizations or ED visits within the last 12 months?: Yes    How many hospitalizations have you had in the last year?: 1-2    Advance Care Planning:   Living will: Yes    Durable POA for healthcare: Yes    Advanced directive: Yes    Advanced directive counseling given: Yes      Cognitive Screening:   Provider or family/friend/caregiver concerned regarding cognition?: No    PREVENTIVE SCREENINGS      Cardiovascular Screening:    General: Screening Not Indicated, History Lipid Disorder and Screening Current      Diabetes Screening:     General: Screening Current      Colorectal Cancer Screening:     General: Screening Current      Breast Cancer Screening:     General: Screening Current      Cervical Cancer Screening:    General: Screening Not Indicated      Osteoporosis Screening:    General: Screening Not Indicated and History Osteoporosis      Abdominal Aortic Aneurysm (AAA) Screening:        General: Screening Not Indicated      Lung Cancer Screening:     General: Screening Not Indicated      Hepatitis C Screening:    General: Screening Current    Screening, Brief Intervention, and Referral to Treatment (SBIRT)    Screening  Typical number of drinks in a day: 0  Typical number of drinks in a week: 0  Interpretation: Low risk drinking behavior.    AUDIT-C Screenin) How often did you have a drink containing alcohol in the past year? never  2) How  "many drinks did you have on a typical day when you were drinking in the past year? 0  3) How often did you have 6 or more drinks on one occasion in the past year? never    AUDIT-C Score: 0  Interpretation: Score 0-2 (female): Negative screen for alcohol misuse    Single Item Drug Screening:  How often have you used an illegal drug (including marijuana) or a prescription medication for non-medical reasons in the past year? never    Single Item Drug Screen Score: 0  Interpretation: Negative screen for possible drug use disorder    No results found.     Physical Exam:     /72 (BP Location: Left arm, Patient Position: Sitting, Cuff Size: Standard)   Pulse 64   Ht 5' 3.75\" (1.619 m)   Wt 56.7 kg (125 lb)   BMI 21.62 kg/m²     Physical Exam  Vitals and nursing note reviewed.   Constitutional:       Appearance: Normal appearance. She is well-developed.   HENT:      Head: Normocephalic and atraumatic.   Eyes:      General: Lids are normal.      Conjunctiva/sclera: Conjunctivae normal.      Pupils: Pupils are equal, round, and reactive to light.   Cardiovascular:      Rate and Rhythm: Normal rate and regular rhythm.      Heart sounds: Murmur heard.      Systolic murmur is present with a grade of 4/6.   Pulmonary:      Effort: Pulmonary effort is normal.      Breath sounds: Normal breath sounds.   Musculoskeletal:      Right lower leg: No edema.      Left lower leg: No edema.   Skin:     General: Skin is warm and dry.   Neurological:      General: No focal deficit present.      Mental Status: She is alert.      Coordination: Coordination is intact.   Psychiatric:         Mood and Affect: Mood normal.         Behavior: Behavior normal. Behavior is cooperative.         Thought Content: Thought content normal.         Judgment: Judgment normal.          Didi Talley PA-C  "

## 2024-04-24 NOTE — ASSESSMENT & PLAN NOTE
Pt is following closely with cardio and it is not felt she needs replacement yet and if she does she will need to go out of states to specialist.

## 2024-04-24 NOTE — PATIENT INSTRUCTIONS
1. Essential hypertension  Assessment & Plan:  Well-controlled today.    Orders:  -     Comprehensive metabolic panel; Future; Expected date: 10/24/2024  -     CBC and differential; Future; Expected date: 10/24/2024    2. Malignant melanoma of skin (HCC)  Assessment & Plan:  Continue with dermatology follow-up      3. Age-related osteoporosis without current pathological fracture  Assessment & Plan:  DEXA order given but pt does not want to go as will not take any med for this dx.     Orders:  -     DXA bone density spine hip and pelvis; Future; Expected date: 04/24/2024  -     Vitamin D 25 hydroxy; Future; Expected date: 10/24/2024    4. Paroxysmal atrial fibrillation (HCC)  Assessment & Plan:  Continue with Xarelto and cardiology follow-up.      5. Hypercholesterolemia  Assessment & Plan:  Patient is currently taking Lipitor 20 keeping LDL at goal at 79 continue recheck 6 months.    Orders:  -     Lipid Panel with Direct LDL reflex; Future; Expected date: 10/24/2024    6. Generalized anxiety disorder  Assessment & Plan:  Xanax as needed.      7. Irritable bowel syndrome, unspecified type  Assessment & Plan:  Is following with gastroenterology.      8. mitral valve prolapse, torn chord, with moderate to severe regurgitation  Assessment & Plan:  Pt is following closely with cardio and it is not felt she needs replacement yet and if she does she will need to go [place] of states to specialist.

## 2024-04-29 ENCOUNTER — OFFICE VISIT (OUTPATIENT)
Dept: FAMILY MEDICINE CLINIC | Facility: CLINIC | Age: 79
End: 2024-04-29
Payer: MEDICARE

## 2024-04-29 VITALS
DIASTOLIC BLOOD PRESSURE: 68 MMHG | WEIGHT: 125.6 LBS | HEART RATE: 76 BPM | SYSTOLIC BLOOD PRESSURE: 132 MMHG | BODY MASS INDEX: 21.44 KG/M2 | HEIGHT: 64 IN

## 2024-04-29 DIAGNOSIS — R39.9 UTI SYMPTOMS: Primary | ICD-10-CM

## 2024-04-29 DIAGNOSIS — R42 VERTIGO: ICD-10-CM

## 2024-04-29 LAB
SL AMB  POCT GLUCOSE, UA: NORMAL
SL AMB LEUKOCYTE ESTERASE,UA: NORMAL
SL AMB POCT BILIRUBIN,UA: NORMAL
SL AMB POCT BLOOD,UA: NORMAL
SL AMB POCT CLARITY,UA: CLEAR
SL AMB POCT COLOR,UA: YELLOW
SL AMB POCT KETONES,UA: NORMAL
SL AMB POCT NITRITE,UA: NORMAL
SL AMB POCT PH,UA: 5.6
SL AMB POCT SPECIFIC GRAVITY,UA: 1.02
SL AMB POCT URINE PROTEIN: NORMAL
SL AMB POCT UROBILINOGEN: 0.2

## 2024-04-29 PROCEDURE — 87186 SC STD MICRODIL/AGAR DIL: CPT | Performed by: PHYSICIAN ASSISTANT

## 2024-04-29 PROCEDURE — G2211 COMPLEX E/M VISIT ADD ON: HCPCS | Performed by: PHYSICIAN ASSISTANT

## 2024-04-29 PROCEDURE — 81002 URINALYSIS NONAUTO W/O SCOPE: CPT | Performed by: PHYSICIAN ASSISTANT

## 2024-04-29 PROCEDURE — 87086 URINE CULTURE/COLONY COUNT: CPT | Performed by: PHYSICIAN ASSISTANT

## 2024-04-29 PROCEDURE — 87077 CULTURE AEROBIC IDENTIFY: CPT | Performed by: PHYSICIAN ASSISTANT

## 2024-04-29 PROCEDURE — 99214 OFFICE O/P EST MOD 30 MIN: CPT | Performed by: PHYSICIAN ASSISTANT

## 2024-04-29 RX ORDER — SULFAMETHOXAZOLE AND TRIMETHOPRIM 800; 160 MG/1; MG/1
1 TABLET ORAL EVERY 12 HOURS SCHEDULED
Qty: 10 TABLET | Refills: 0 | Status: SHIPPED | OUTPATIENT
Start: 2024-04-29 | End: 2024-05-04

## 2024-04-29 NOTE — PROGRESS NOTES
Name: Zenia Youssef      : 1945      MRN: [id number]  Encounter Provider: Didi Talley PA-C  Encounter Date: 2024   Encounter department: [facility]    Assessment & Plan     1. UTI symptoms  Comments:  Bactrim as directed. Increase fluids. Culture urine and call if we need to change Rx. UTI prevention sheet given at check out.  Orders:  -     POCT urine dip  -     Urine culture; Future  -     Urine culture  -     sulfamethoxazole-trimethoprim (BACTRIM DS) 800-160 mg per tablet; Take 1 tablet by mouth every 12 (twelve) hours for 5 days    2. Vertigo  Assessment & Plan:  Positional and so I would like pt to go to PT for vestibular eval.     Orders:  -     Ambulatory Referral to Physical Therapy; Future           Subjective      Patient presents with:  Possible UTI: S/s started last night states she has pain w/ urination, did have lower back pain and also having diarrhea     PT was away traveling the weekend and rest area was closed so had to hold her bladder for several hours.         Pt also c/o frequent dizziness when she lyes down turns over or gets up. Brought th is info forward since she c/o dizziness on exam today.       Review of Systems   Constitutional: Negative.    HENT: Negative.     Eyes: Negative.    Respiratory: Negative.     Cardiovascular: Negative.    Gastrointestinal: Negative.    Endocrine: Negative.    Genitourinary:  Positive for dysuria and frequency.   Musculoskeletal: Negative.    Skin: Negative.    Allergic/Immunologic: Negative.    Neurological:  Positive for dizziness.   Hematological: Negative.    Psychiatric/Behavioral: Negative.         Current Outpatient Medications on File Prior to Visit   Medication Sig   • ALPRAZolam (XANAX) 0.25 mg tablet Take 1 tablet (0.25 mg total) by mouth daily as needed for anxiety   • atorvastatin (LIPITOR) 20 mg tablet Take 1 tablet (20 mg total) by mouth daily   • BIOTIN PO Take by mouth   • bisoprolol (ZEBETA) 5 mg  "tablet Take 1 tablet (5 mg total) by mouth daily   • calcium carbonate (OS-BRYCE) 600 MG tablet Take 600 mg by mouth 2 (two) times a day with meals   • Cholecalciferol (VITAMIN D3) 1000 units CAPS Take 2 capsules by mouth daily   • diltiazem (CARDIZEM CD) 240 mg 24 hr capsule Take 1 capsule (240 mg total) by mouth daily   • Lactobacillus Rhamnosus, GG, (CULTURELLE PO) Take by mouth daily   • lisinopril (ZESTRIL) 5 mg tablet Take 1 tablet (5 mg total) by mouth daily   • melatonin 1 mg Take 1 mg by mouth daily at bedtime as needed   • rivaroxaban (Xarelto) 20 mg tablet Take 1 tablet (20 mg total) by mouth daily with breakfast   • Vitamin Mixture ([place]-C) 500-60 MG TABS Take by mouth   • b complex vitamins capsule Take 1 capsule by mouth daily (Patient not taking: Reported on 4/22/2024)       Objective     /68 (BP Location: Left arm, Patient Position: Sitting, Cuff Size: Adult)   Pulse 76   Ht 5' 3.75\" (1.619 m)   Wt 57 kg (125 lb 9.6 oz)   BMI 21.73 kg/m²     Physical Exam  Vitals and nursing note reviewed.   Constitutional:       General: She is not in acute distress.     Appearance: She is well-developed. She is not diaphoretic.   HENT:      Head: Normocephalic and atraumatic.      Nose: Nose normal.   Eyes:      General:         Right eye: No discharge.         Left eye: No discharge.      Conjunctiva/sclera: Conjunctivae normal.   Neck:      Vascular: No carotid bruit.   Cardiovascular:      Rate and Rhythm: Normal rate and regular rhythm.      Heart sounds: Normal heart sounds. No murmur heard.     No friction rub. No gallop.   Pulmonary:      Effort: Pulmonary effort is normal. No respiratory distress.      Breath sounds: Normal breath sounds. No wheezing or rales.   Abdominal:      General: Abdomen is protuberant. Bowel sounds are normal.      Palpations: Abdomen is soft.      Tenderness: There is abdominal tenderness in the suprapubic area. There is no right CVA tenderness or left CVA tenderness. "   Musculoskeletal:      Cervical back: Neck supple.   Skin:     General: Skin is warm and dry.   Neurological:      Mental Status: She is alert and oriented to person, place, and time.   Psychiatric:         Judgment: Judgment normal.       Didi Talley PA-C

## 2024-04-29 NOTE — PATIENT INSTRUCTIONS
1. UTI symptoms  Comments:  Bactrim as directed. Increase fluids. Culture urine and call if we need to change Rx. UTI prevention sheet given at check out.  Orders:  -     POCT urine dip  -     Urine culture; Future  -     Urine culture  -     sulfamethoxazole-trimethoprim (BACTRIM DS) 800-160 mg per tablet; Take 1 tablet by mouth every 12 (twelve) hours for 5 days    2. Vertigo  Assessment & Plan:  Positional and so I would like pt to go to PT for vestibular eval.     Orders:  -     Ambulatory Referral to Physical Therapy; Future      Urinary Tract Infection in Women   AMBULATORY CARE:   A urinary tract infection (UTI)  is caused by bacteria that get inside your urinary tract. Your urinary tract includes your kidneys, ureters, bladder, and urethra. A UTI is more common in your lower urinary tract, which includes your bladder and urethra.       Common symptoms include the following:   Urinating more often or waking from sleep to urinate    Pain or burning when you urinate    Pain or pressure in your lower abdomen and back    Urine that smells bad    Blood in your urine    Leaking urine    Seek care immediately if:   You are urinating very little or not at all.    You have a high fever with shaking chills.    You have side or back pain that gets worse.    Call your doctor if:   You have a fever.    You do not feel better after 2 days of taking antibiotics.    You have new symptoms, such as blood or pus in your urine.    You are vomiting.    You have questions or concerns about your condition or care.    Treatment for a UTI  may include antibiotics to treat a bacterial infection. You may also need medicines to decrease pain and burning, or decrease the urge to urinate often. If you have UTIs often (called recurrent UTIs), you may be given antibiotics to take regularly. You will be given directions for when and how to use antibiotics. The goal is to prevent UTIs but not cause antibiotic resistance by using antibiotics  too often.  Prevent a UTI:   Empty your bladder often.  Urinate and empty your bladder as soon as you feel the need. Do not hold your urine for long periods of time.    Wipe from front to back after you urinate or have a bowel movement.  This will help prevent germs from getting into your urinary tract through your urethra.    Drink liquids as directed.  Ask how much liquid to drink each day and which liquids are best for you. You may need to drink more liquids than usual to help flush out the bacteria. Do not drink alcohol, caffeine, or citrus juices. These can irritate your bladder and increase your symptoms. Your healthcare provider may recommend cranberry juice to help prevent a UTI.    Urinate before and after you have sex.  This can help flush out bacteria passed during sex.    Do not douche or use feminine deodorants.  These can change the chemical balance in your vagina.    Change sanitary pads or tampons often.  This will help prevent germs from getting into your urinary tract.    Talk to your healthcare provider about your birth control method.  You may need to change your method if it is increasing your risk for UTIs.    Wear cotton underwear and clothes that are loose.  Tight pants and nylon underwear can trap moisture and cause bacteria to grow.    Vaginal estrogen may be recommended.  This medicine helps prevent UTIs in women who have gone through menopause or are in jens-menopause.    Do pelvic muscle exercises often.  Pelvic muscle exercises may help you start and stop urinating. Strong pelvic muscles may help you empty your bladder easier. Squeeze these muscles tightly for 5 seconds like you are trying to hold back urine. Then relax for 5 seconds. Gradually work up to squeezing for 10 seconds. Do 3 sets of 15 repetitions a day, or as directed.    Follow up with your doctor as directed:  Write down your questions so you remember to ask them during your visits.  © Copyright [employer] 2023 Information  is for End User's use only and may not be sold, redistributed or otherwise used for commercial purposes.  The above information is an  only. It is not intended as medical advice for individual conditions or treatments. Talk to your doctor, nurse or pharmacist before following any medical regimen to see if it is safe and effective for you.

## 2024-05-01 ENCOUNTER — TELEPHONE (OUTPATIENT)
Age: 79
End: 2024-05-01

## 2024-05-01 DIAGNOSIS — I48.91 NEW ONSET ATRIAL FIBRILLATION (HCC): ICD-10-CM

## 2024-05-01 DIAGNOSIS — I48.91 ATRIAL FIBRILLATION WITH RAPID VENTRICULAR RESPONSE (HCC): ICD-10-CM

## 2024-05-01 NOTE — TELEPHONE ENCOUNTER
Please let pt know the results in her chart are only preliminary and I am waiting final results. Will reach out when reviewed/addressed. Thank you.

## 2024-05-01 NOTE — TELEPHONE ENCOUNTER
Pt notified.  Pt got confused because she received a call from [facility] today and she thought it was us, but now she realized it might of been a specialist who was supposed to call to set up an appointment for her .

## 2024-05-02 LAB — BACTERIA UR CULT: ABNORMAL

## 2024-05-02 NOTE — RESULT ENCOUNTER NOTE
Please let pt know that her final urine culture shows she has a UTI and she should finish her current antibiotic. Thank you.

## 2024-05-03 RX ORDER — DILTIAZEM HYDROCHLORIDE 240 MG/1
CAPSULE, COATED, EXTENDED RELEASE ORAL
Qty: 90 CAPSULE | Refills: 1 | Status: SHIPPED | OUTPATIENT
Start: 2024-05-03

## 2024-05-06 ENCOUNTER — OFFICE VISIT (OUTPATIENT)
Dept: FAMILY MEDICINE CLINIC | Facility: CLINIC | Age: 79
End: 2024-05-06
Payer: MEDICARE

## 2024-05-06 VITALS
RESPIRATION RATE: 12 BRPM | BODY MASS INDEX: 21.73 KG/M2 | OXYGEN SATURATION: 98 % | HEART RATE: 69 BPM | SYSTOLIC BLOOD PRESSURE: 140 MMHG | TEMPERATURE: 97.3 F | WEIGHT: 125.6 LBS | DIASTOLIC BLOOD PRESSURE: 62 MMHG

## 2024-05-06 DIAGNOSIS — K52.1 DIARRHEA DUE TO DRUG: Primary | ICD-10-CM

## 2024-05-06 PROCEDURE — G2211 COMPLEX E/M VISIT ADD ON: HCPCS | Performed by: PHYSICIAN ASSISTANT

## 2024-05-06 PROCEDURE — 99213 OFFICE O/P EST LOW 20 MIN: CPT | Performed by: PHYSICIAN ASSISTANT

## 2024-05-06 NOTE — PROGRESS NOTES
sName: Zenia Youssef      : 1945      MRN: [id number]  Encounter Provider: Didi Talley PA-C  Encounter Date: 2024   Encounter department: [facility] PRIMARY CARE    Assessment & Plan     1. Diarrhea due to drug  Comments:  BRAT diet, increase fluids, avoid dairy adn test for Cdiff if diarrhea persists as pt just finished Bactrim.  Orders:  -     Clostridium difficile toxin by PCR with EIA; Future        Depression Screening and Follow-up Plan: Patient was screened for depression during today's encounter. They screened negative with a PHQ-2 score of 0.        Subjective      Patient presents with:  Diarrhea: Diarrhea started yesterday, along with chills she is better now than this morning. Finished Antibiotic yesterday for her UTI    Pt was on Bactrim DS for UTI and  yesterday developed diarrhea 7 times and watery/loose after finishing her course. Unable to go to [employer]. Increase in gas. Feels better right now but did have cramping and then urgent BM evacuation.         Review of Systems   Constitutional: Negative.    HENT: Negative.     Eyes: Negative.    Respiratory: Negative.     Cardiovascular: Negative.    Gastrointestinal:  Positive for diarrhea.   Endocrine: Negative.    Genitourinary: Negative.    Musculoskeletal: Negative.    Skin: Negative.    Allergic/Immunologic: Negative.    Neurological: Negative.    Hematological: Negative.    Psychiatric/Behavioral: Negative.         Current Outpatient Medications on File Prior to Visit   Medication Sig   • ALPRAZolam (XANAX) 0.25 mg tablet Take 1 tablet (0.25 mg total) by mouth daily as needed for anxiety   • atorvastatin (LIPITOR) 20 mg tablet Take 1 tablet (20 mg total) by mouth daily   • BIOTIN PO Take by mouth   • bisoprolol (ZEBETA) 5 mg tablet Take 1 tablet (5 mg total) by mouth daily   • calcium carbonate (OS-BRYCE) 600 MG tablet Take 600 mg by mouth 2 (two) times a day with meals   • Cholecalciferol (VITAMIN D3) 1000 units  CAPS Take 2 capsules by mouth daily   • diltiazem (CARDIZEM CD) 240 mg 24 hr capsule TAKE 1 CAPSULE(240 MG) BY MOUTH DAILY   • Lactobacillus Rhamnosus, GG, (CULTURELLE PO) Take by mouth daily   • lisinopril (ZESTRIL) 5 mg tablet Take 1 tablet (5 mg total) by mouth daily   • melatonin 1 mg Take 1 mg by mouth daily at bedtime as needed   • rivaroxaban (Xarelto) 20 mg tablet Take 1 tablet (20 mg total) by mouth daily with breakfast   • Vitamin Mixture (Glenna-C) 500-60 MG TABS Take by mouth   • b complex vitamins capsule Take 1 capsule by mouth daily (Patient not taking: Reported on 4/22/2024)       Objective     /62 (BP Location: Left arm, Patient Position: Sitting, Cuff Size: Standard)   Pulse 69   Temp (!) 97.3 °F (36.3 °C) (Tympanic)   Resp 12   Wt 57 kg (125 lb 9.6 oz)   SpO2 98%   BMI 21.73 kg/m²     Physical Exam  Vitals and nursing note reviewed.   Constitutional:       General: She is not in acute distress.     Appearance: She is well-developed. She is not diaphoretic.   HENT:      Head: Normocephalic and atraumatic.      Nose: Nose normal.   Eyes:      General:         Right eye: No discharge.         Left eye: No discharge.      Conjunctiva/sclera: Conjunctivae normal.   Neck:      Vascular: No carotid bruit.   Cardiovascular:      Rate and Rhythm: Normal rate and regular rhythm.      Heart sounds: Normal heart sounds. No murmur heard.     No friction rub. No gallop.   Pulmonary:      Effort: Pulmonary effort is normal. No respiratory distress.      Breath sounds: Normal breath sounds. No wheezing or rales.   Abdominal:      General: Abdomen is flat. Bowel sounds are increased.      Palpations: Abdomen is soft.      Tenderness: There is no abdominal tenderness.   Musculoskeletal:      Cervical back: Neck supple.   Skin:     General: Skin is warm and dry.   Neurological:      Mental Status: She is alert and oriented to person, place, and time.   Psychiatric:         Judgment: Judgment normal.        Didi Talley PA-C

## 2024-05-06 NOTE — PATIENT INSTRUCTIONS
1. Encounter for screening mammogram for breast cancer    2. Diarrhea due to drug  Comments:  BRAT diet, increase fluids, avoid dairy adn test for Cdiff if diarrhea persists as pt just finished Bactrim.  Orders:  -     Clostridium difficile toxin by PCR with EIA; Future

## 2024-05-26 DIAGNOSIS — I34.0 NONRHEUMATIC MITRAL VALVE REGURGITATION: ICD-10-CM

## 2024-05-26 DIAGNOSIS — I48.0 PAROXYSMAL ATRIAL FIBRILLATION (HCC): ICD-10-CM

## 2024-05-26 DIAGNOSIS — I25.10 NONOBSTRUCTIVE ATHEROSCLEROSIS OF CORONARY ARTERY: ICD-10-CM

## 2024-05-26 RX ORDER — LISINOPRIL 5 MG/1
5 TABLET ORAL DAILY
Qty: 90 TABLET | Refills: 1 | Status: SHIPPED | OUTPATIENT
Start: 2024-05-26

## 2024-07-22 DIAGNOSIS — F41.1 GENERALIZED ANXIETY DISORDER: ICD-10-CM

## 2024-07-22 RX ORDER — ALPRAZOLAM 0.25 MG/1
0.25 TABLET ORAL DAILY PRN
Qty: 30 TABLET | Refills: 0 | Status: SHIPPED | OUTPATIENT
Start: 2024-07-22

## 2024-07-29 DIAGNOSIS — I48.91 NEW ONSET ATRIAL FIBRILLATION (HCC): ICD-10-CM

## 2024-07-29 DIAGNOSIS — I48.91 ATRIAL FIBRILLATION WITH RAPID VENTRICULAR RESPONSE (HCC): ICD-10-CM

## 2024-07-29 RX ORDER — RIVAROXABAN 20 MG/1
TABLET, FILM COATED ORAL
Qty: 30 TABLET | Refills: 5 | Status: SHIPPED | OUTPATIENT
Start: 2024-07-29

## 2024-08-23 ENCOUNTER — OFFICE VISIT (OUTPATIENT)
Dept: CARDIOLOGY CLINIC | Facility: CLINIC | Age: 79
End: 2024-08-23
Payer: MEDICARE

## 2024-08-23 VITALS
BODY MASS INDEX: 21.82 KG/M2 | HEIGHT: 64 IN | WEIGHT: 127.8 LBS | DIASTOLIC BLOOD PRESSURE: 74 MMHG | SYSTOLIC BLOOD PRESSURE: 140 MMHG | HEART RATE: 60 BPM

## 2024-08-23 DIAGNOSIS — I48.91 ATRIAL FIBRILLATION WITH RAPID VENTRICULAR RESPONSE (HCC): ICD-10-CM

## 2024-08-23 DIAGNOSIS — E78.00 HYPERCHOLESTEROLEMIA: ICD-10-CM

## 2024-08-23 DIAGNOSIS — I48.91 NEW ONSET ATRIAL FIBRILLATION (HCC): ICD-10-CM

## 2024-08-23 DIAGNOSIS — I48.0 PAROXYSMAL ATRIAL FIBRILLATION (HCC): Primary | ICD-10-CM

## 2024-08-23 DIAGNOSIS — I34.0 NONRHEUMATIC MITRAL VALVE REGURGITATION: ICD-10-CM

## 2024-08-23 DIAGNOSIS — I35.8 AORTIC VALVE SCLEROSIS: ICD-10-CM

## 2024-08-23 DIAGNOSIS — I48.0 PAF (PAROXYSMAL ATRIAL FIBRILLATION) (HCC): ICD-10-CM

## 2024-08-23 DIAGNOSIS — I25.10 NONOBSTRUCTIVE ATHEROSCLEROSIS OF CORONARY ARTERY: ICD-10-CM

## 2024-08-23 PROCEDURE — 99214 OFFICE O/P EST MOD 30 MIN: CPT | Performed by: INTERNAL MEDICINE

## 2024-08-23 PROCEDURE — 93000 ELECTROCARDIOGRAM COMPLETE: CPT | Performed by: INTERNAL MEDICINE

## 2024-08-23 RX ORDER — BISOPROLOL FUMARATE 5 MG/1
2.5 TABLET, FILM COATED ORAL DAILY
Start: 2024-08-23

## 2024-08-23 RX ORDER — DILTIAZEM HYDROCHLORIDE 240 MG/1
240 CAPSULE, COATED, EXTENDED RELEASE ORAL DAILY
Qty: 90 CAPSULE | Refills: 3 | Status: SHIPPED | OUTPATIENT
Start: 2024-08-23

## 2024-08-23 NOTE — PROGRESS NOTES
[facility]  [address] [place] [address]  Phone#  [phone]   Fax#  [phone]  *-*-*-*-*-*-*-*-*-*-*-*-*-*-*-*-*-*-*-*-*-*-*-*-*-*-*-*-*-*-*-*-*-*-*-*-*-*-*-*-*-*-*-*-*-*-*-*-*-*-*-*-*-*                                   Cardiology Follow Up      ENCOUNTER DATE: 24 11:01 AM  PATIENT NAME: Zenia Youssef   : 1945    MRN: [id number]  AGE:79 y.o.      SEX: female  ENCOUNTER PROVIDER:Cosmo King MD     PRIMARY CARE PHYSICIAN: Didi Talley PA-C    CARDIOLOGY SPECIALTY COMMENTS  Patient was 1st seen on 2021. Patient is a is a 76-year-old female who developed symptoms of chest tightness radiating into her neck and jaw with shortness of breath on physical exertion such as going up a flight of stairs.  The discomfort would melba with rest.  Her primary care physician ordered a stress echocardiogram which was nondiagnostic and equivocal for myocardial ischemia.  Mild mitral regurgitation was noted on the echocardiogram.  Patient states that since the stress test, her exertional chest tightness and shortness of breath has been much less severe.  Patient has no family history of coronary artery disease.  A sister has a murmur.  She is a lifetime nonsmoker.  Patient had a melanoma removed 11 years ago and has had no evidence of recurrence.    2021 nuclear stress test: Resting hypertension with exaggerated hypertensive response to exercise negative treadmill stress test for angina pectoris but with abnormal ST depression with exercise. Exercise induced ventricular ectopy. Normal LV systolic function, EF 77%. Normal tomographic perfusion series.    2021 echocardiogram:  Normal LV function EF 60%, mild concentric LVH.  Grade 1 diastolic dysfunction.  Mild-to-moderate left atrial enlargement.  Moderate mitral regurgitation.    2022 cardiac catheterization: Ostial circumflex lesion 50% stenosis.    -2023 [facility] new onset atrial  fibrillation with RVR    7/12/2023 echocardiogram: Normal left ventricular systolic function, EF 60% atrial fibrillation with diastolic dysfunction.  Sigmoid shaped septum.  Severe LA enlargement.  Mild RA dilated.  Aortic sclerosis.  Valve posterior leaflet prolapse with moderate regurgitation.  Mild to moderate tricuspid regurgitation.  PASP 59 mmHg.Z    1/22/2024 [facility]: LVEF 60%.  RV normal.  LA and RA dilated.  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      ACTIVE PROBLEM LIST  Patient Active Problem List   Diagnosis    Generalized anxiety disorder    Diverticulosis    GERD without esophagitis    Hiatal hernia    Hypercholesterolemia    Essential hypertension    Irritable bowel syndrome    Malignant melanoma of skin (HCC)    Age-related osteoporosis without current pathological fracture    Squamous cell carcinoma of skin    Splenic artery aneurysm (HCC)    Other specified disorders of rotator cuff syndrome of shoulder and allied disorders    mitral valve prolapse, torn chord, with moderate to severe regurgitation    Nonobstructive atherosclerosis of coronary artery    Paroxysmal atrial fibrillation (HCC)    Aortic valve sclerosis    Allergic rhinitis    Vertigo       ACTIVE DIAGNOSIS AND PLAN    1. Paroxysmal atrial fibrillation (HCC)  Assessment & Plan:  Atrial fibrillation first noted 7/12/2023 when hospitalized with atrial fibrillation with RVR     Xarelto 20 mg daily  Diltiazem  mg daily  Bisoprolol 5 mg daily  Orders:  -     POCT ECG  2. Nonobstructive atherosclerosis of coronary artery  Assessment & Plan:  02/24/2022 cardiac catheterization: Ostial circumflex lesion 50% stenosis.   Orders:  -     POCT ECG  3. mitral valve prolapse, torn chord, with moderate to severe regurgitation  Assessment & Plan:  Case reviewed with Dr Davidson who feels that the valve may need to be replaced. I do not feel patient is symptomatic enough to warrant replacement    Not on ARB due  to abdominal pain which Dr. Miller felt was from the losartan     Tried placing patient on carvedilol 3.125 mg 2 times a day and she called stating it is making dizzy and she is very SOB, worse than without the medication. She states she cannot drive taking this.  Coreg was discontinued.  Orders:  -     POCT ECG  4. Hypercholesterolemia  Assessment & Plan:  Lab Results   Component Value Date    CHOLESTEROL 140 04/11/2024     Lab Results   Component Value Date    TRIG 72 04/11/2024     Lab Results   Component Value Date    HDL 47 (L) 04/11/2024     Lab Results   Component Value Date    LDLCALC 79 04/11/2024     Lab Results   Component Value Date    LDLDIRECT 112 05/07/2014     Atorvastatin 20 mg daily  Orders:  -     POCT ECG  5. Aortic valve sclerosis  Assessment & Plan:  Murmur noted on exam and aortic sclerosis noted on echo  Orders:  -     POCT ECG  6. PAF (paroxysmal atrial fibrillation) (HCC)  -     bisoprolol (ZEBETA) 5 mg tablet; Take 0.5 tablets (2.5 mg total) by mouth daily  7. Atrial fibrillation with rapid ventricular response (HCC)  -     diltiazem (CARDIZEM CD) 240 mg 24 hr capsule; Take 1 capsule (240 mg total) by mouth daily  8. New onset atrial fibrillation (HCC)  -     diltiazem (CARDIZEM CD) 240 mg 24 hr capsule; Take 1 capsule (240 mg total) by mouth daily     INTERVAL HISTORY:        Patient has palpitations every now and then.  She has no associated symptoms.  They do not last a long time.  She denies shortness of breath.  She is able to go up flight of stairs without any difficulty.  She has no swelling in her legs.  She denies chest discomfort.    DISCUSSION/PLAN:          Continue present medications  Return in 6 months  EKG on return    Results for orders placed or performed in visit on 08/23/24   POCT ECG    Narrative    Normal sinus rhythm at a rate of 60 bpm with occasional premature ventricular contractions.  Normal EKG.         Lab Studies:    Lab Results   Component Value Date     CHOLESTEROL 140 04/11/2024    CHOLESTEROL 132 10/03/2023    CHOLESTEROL 124 03/23/2023     Lab Results   Component Value Date    TRIG 72 04/11/2024    TRIG 78 10/03/2023    TRIG 61 03/23/2023     Lab Results   Component Value Date    HDL 47 (L) 04/11/2024    HDL 48 (L) 10/03/2023    HDL 49 (L) 03/23/2023     Lab Results   Component Value Date    LDLCALC 79 04/11/2024    LDLCALC 68 10/03/2023    LDLCALC 63 03/23/2023       Lab Results   Component Value Date    LDLDIRECT 112 05/07/2014     Lab Results   Component Value Date    EGFR 81 04/11/2024    EGFR 69 10/03/2023    EGFR 66 07/20/2023    SODIUM 142 04/11/2024    SODIUM 139 10/03/2023    SODIUM 140 07/20/2023    K 4.1 04/11/2024    K 4.2 10/03/2023    K 4.2 07/20/2023     04/11/2024     10/03/2023     07/20/2023    CO2 28 04/11/2024    CO2 28 10/03/2023    CO2 27 07/20/2023    ANIONGAP 5 05/07/2014    BUN 26 (H) 04/11/2024    BUN 26 (H) 10/03/2023    BUN 22 07/20/2023    CREATININE 0.72 04/11/2024    CREATININE 0.82 10/03/2023    CREATININE 0.85 07/20/2023     Lab Results   Component Value Date    WBC 7.16 10/03/2023    WBC 6.71 07/20/2023    WBC 8.03 07/14/2023    HGB 14.0 10/03/2023    HGB 12.9 07/20/2023    HGB 12.8 07/14/2023    HCT 42.3 10/03/2023    HCT 39.8 07/20/2023    HCT 39.5 07/14/2023    MCV 91 10/03/2023    MCV 92 07/20/2023    MCV 92 07/14/2023    MCH 30.2 10/03/2023    MCH 29.9 07/20/2023    MCH 29.8 07/14/2023    MCHC 33.1 10/03/2023    MCHC 32.4 07/20/2023    MCHC 32.4 07/14/2023     10/03/2023     07/20/2023     07/14/2023      Lab Results   Component Value Date    GLUCOSE 98 05/07/2014    CALCIUM 9.3 04/11/2024    CALCIUM 9.4 10/03/2023    CALCIUM 9.3 07/20/2023    ALB 4.1 04/11/2024    ALB 4.2 10/03/2023    ALB 4.5 07/12/2023    TP 7.4 04/11/2024    TP 7.2 10/03/2023    TP 7.8 07/12/2023    AST 23 04/11/2024    AST 18 10/03/2023    AST 26 07/12/2023    ALT 26 04/11/2024    ALT 28 10/03/2023    ALT 33  07/12/2023    ALKPHOS 80 04/11/2024    ALKPHOS 76 10/03/2023    ALKPHOS 88 07/12/2023    BILITOT 0.37 05/07/2014       Lab Results   Component Value Date     (H) 07/12/2023         Current Outpatient Medications:     ALPRAZolam (XANAX) 0.25 mg tablet, Take 1 tablet (0.25 mg total) by mouth daily as needed for anxiety, Disp: 30 tablet, Rfl: 0    atorvastatin (LIPITOR) 20 mg tablet, Take 1 tablet (20 mg total) by mouth daily, Disp: 90 tablet, Rfl: 3    BIOTIN PO, Take by mouth, Disp: , Rfl:     bisoprolol (ZEBETA) 5 mg tablet, Take 0.5 tablets (2.5 mg total) by mouth daily, Disp: , Rfl:     calcium carbonate (OS-BRYCE) 600 MG tablet, Take 600 mg by mouth 2 (two) times a day with meals, Disp: , Rfl:     Cholecalciferol (VITAMIN D3) 1000 units CAPS, Take 2 capsules by mouth daily, Disp: , Rfl:     diltiazem (CARDIZEM CD) 240 mg 24 hr capsule, Take 1 capsule (240 mg total) by mouth daily, Disp: 90 capsule, Rfl: 3    Lactobacillus Rhamnosus, GG, (CULTURELLE PO), Take by mouth daily, Disp: , Rfl:     melatonin 1 mg, Take 1 mg by mouth daily at bedtime as needed, Disp: , Rfl:     Vitamin Mixture (Glenna-C) 500-60 MG TABS, Take by mouth, Disp: , Rfl:     Xarelto 20 MG tablet, TAKE 1 TABLET(20 MG) BY MOUTH DAILY WITH BREAKFAST, Disp: 30 tablet, Rfl: 5  Allergies   Allergen Reactions    Losartan Abdominal Pain     Recent demonstrates that this side effect will be experienced by the entire class of drugs.    Macrobid [Nitrofurantoin] GI Intolerance    Penicillins Other (See Comments)     Childhood. Per pt, unsure of reaction    Protonix [Pantoprazole] Nausea Only    Wound Dressing Adhesive Other (See Comments)     bandaid if on for more than 24 hours       Past Medical History:   Diagnosis Date    Anxiety     Diverticulosis     GERD (gastroesophageal reflux disease)     Heart murmur last year    Hiatal hernia     Hypertension     Melanoma (HCC)     Osteoporosis      Social History     Socioeconomic History    Marital  status: /Civil Union     Spouse name: Not on file    Number of children: Not on file    Years of education: Not on file    Highest education level: Not on file   Occupational History    Occupation: retired - [employer]    Tobacco Use    Smoking status: Never    Smokeless tobacco: Never    Tobacco comments:     No secondhand smoke exposure    Vaping Use    Vaping status: Never Used   Substance and Sexual Activity    Alcohol use: Not Currently     Comment: very rare socially    Drug use: Never    Sexual activity: Not Currently     Partners: Male   Other Topics Concern    Not on file   Social History Narrative    Not on file     Social Determinants of Health     Financial Resource Strain: Low Risk  (4/10/2023)    Overall Financial Resource Strain (CARDIA)     Difficulty of Paying Living Expenses: Not hard at all   Food Insecurity: No Food Insecurity (4/24/2024)    Hunger Vital Sign     Worried About Running Out of Food in the Last Year: Never true     Ran Out of Food in the Last Year: Never true   Transportation Needs: No Transportation Needs (4/24/2024)    PRAPARE - Transportation     Lack of Transportation (Medical): No     Lack of Transportation (Non-Medical): No   Physical Activity: Not on file   Stress: Not on file   Social Connections: Not on file   Intimate Partner Violence: Not on file   Housing Stability: Low Risk  (4/24/2024)    Housing Stability Vital Sign     Unable to Pay for Housing in the Last Year: No     Number of Times Moved in the Last Year: 1     Homeless in the Last Year: No      Family History   Problem Relation Age of Onset    Uterine cancer Mother     Cancer Mother     Prostate cancer Father     Cancer Father     Osteoporosis Sister      Past Surgical History:   Procedure Laterality Date    CARDIAC CATHETERIZATION N/A 02/24/2022    Procedure: Cardiac catheterization;  Surgeon: Harinder Calvo MD;  Location: [facility];  Service: Cardiology    CARDIAC  "CATHETERIZATION N/A 02/24/2022    Procedure: Cardiac Coronary Angiogram;  Surgeon: Harinder Calvo MD;  Location: [facility];  Service: Cardiology    [facility] duod[facility]; normal stomach; non-severe esophagitis. Bx: Neg. H. pylori, ulceration and dysplasia.    EGD AND COLONOSCOPY  05/14/2014    NORMAL/HP    HYSTERECTOMY  ?       PREVIOUS WEIGHTS:   Wt Readings from Last 10 Encounters:   08/23/24 58 kg (127 lb 12.8 oz)   05/06/24 57 kg (125 lb 9.6 oz)   04/29/24 57 kg (125 lb 9.6 oz)   04/24/24 56.7 kg (125 lb)   04/22/24 57.2 kg (126 lb 3.2 oz)   04/10/24 57.2 kg (126 lb)   02/20/24 56.9 kg (125 lb 6.4 oz)   02/06/24 54.9 kg (121 lb)   01/22/24 56.7 kg (125 lb)   01/09/24 56.9 kg (125 lb 6.4 oz)        Review of Systems:  Review of Systems   Constitutional: Negative.    HENT: Negative.     Respiratory:  Negative for chest tightness and shortness of breath.    Cardiovascular:  Negative for chest pain and leg swelling.   Gastrointestinal: Negative.    Endocrine: Negative.    Genitourinary: Negative.    Musculoskeletal: Negative.    Skin: Negative.    Allergic/Immunologic: Negative.    Neurological: Negative.    Hematological: Negative.    Psychiatric/Behavioral: Negative.         Physical Exam:  /74   Pulse 60   Ht 5' 3.75\" (1.619 m)   Wt 58 kg (127 lb 12.8 oz)   BMI 22.11 kg/m²     Physical Exam  Constitutional:       General: She is not in acute distress.     Appearance: She is well-developed.   HENT:      Head: Normocephalic and atraumatic.   Neck:      Thyroid: No thyromegaly.      Vascular: No carotid bruit or JVD.      Trachea: No tracheal deviation.   Cardiovascular:      Rate and Rhythm: Normal rate and regular rhythm.      Pulses: Normal pulses.      Heart sounds: Murmur (Grade 3/6 holosystolic murmur heard throughout the chest but loudest at the apex) heard.      No friction rub. No gallop.   Pulmonary:      Effort: Pulmonary effort is normal. No " "respiratory distress.      Breath sounds: Normal breath sounds. No wheezing, rhonchi or rales.   Chest:      Chest wall: No tenderness.   Abdominal:      Palpations: Abdomen is soft.   Musculoskeletal:         General: Normal range of motion.      Cervical back: Normal range of motion and neck supple.      Right lower leg: No edema.      Left lower leg: No edema.   Skin:     General: Skin is warm and dry.   Neurological:      General: No focal deficit present.      Mental Status: She is alert and oriented to person, place, and time.      Gait: Gait normal.   Psychiatric:         Mood and Affect: Mood normal.         Behavior: Behavior normal.         Thought Content: Thought content normal.         Judgment: Judgment normal.         ======================================================  Imaging:   I have personally reviewed pertinent reports.      Portions of the record may have been created with voice recognition software. Occasional wrong word or \"sound a like\" substitutions may have occurred due to the inherent limitations of voice recognition software. Read the chart carefully and recognize, using context, where substitutions have occurred.    SIGNATURES:   Cosmo King MD   "

## 2024-09-20 ENCOUNTER — OFFICE VISIT (OUTPATIENT)
Dept: FAMILY MEDICINE CLINIC | Facility: CLINIC | Age: 79
End: 2024-09-20
Payer: MEDICARE

## 2024-09-20 VITALS
OXYGEN SATURATION: 99 % | SYSTOLIC BLOOD PRESSURE: 138 MMHG | DIASTOLIC BLOOD PRESSURE: 68 MMHG | HEART RATE: 68 BPM | HEIGHT: 64 IN | WEIGHT: 126 LBS | BODY MASS INDEX: 21.51 KG/M2

## 2024-09-20 DIAGNOSIS — M54.32 SCIATIC NERVE PAIN, LEFT: Primary | ICD-10-CM

## 2024-09-20 DIAGNOSIS — I48.0 PAROXYSMAL ATRIAL FIBRILLATION (HCC): ICD-10-CM

## 2024-09-20 PROCEDURE — G2211 COMPLEX E/M VISIT ADD ON: HCPCS | Performed by: PHYSICIAN ASSISTANT

## 2024-09-20 PROCEDURE — 99214 OFFICE O/P EST MOD 30 MIN: CPT | Performed by: PHYSICIAN ASSISTANT

## 2024-09-20 RX ORDER — METHYLPREDNISOLONE 4 MG
TABLET, DOSE PACK ORAL
Qty: 21 EACH | Refills: 0 | Status: SHIPPED | OUTPATIENT
Start: 2024-09-20

## 2024-09-20 NOTE — PROGRESS NOTES
Answers submitted by the patient for this visit:  Back Pain Questionnaire (Submitted on 2024)  Chief Complaint: Back pain  Chronicity: new  Onset: 1 to 4 weeks ago  Frequency: daily  Progression since onset: gradually worsening  Pain location: gluteal, lumbar spine, sacro-iliac, thoracic spine  Pain quality: aching, burning, stabbing  Radiates to: left thigh, right thigh  Pain - numeric: 7/10  Pain is: worse during the day  Aggravated by: sitting, twisting  Stiffness is present: in the morning  abdominal pain: No  bladder incontinence: No  bowel incontinence: No  chest pain: No  fever: No  headaches: No  leg pain: Yes  numbness: No  perianal numbness: No  dysuria: No  paresis: No  pelvic pain: No  paresthesias: No  tingling: No  weakness: No  weight loss: No  Risk factors: history of cancer, history of osteoporosis, menopause  Ambulatory Visit  Name: Zenia Youssef      : 1945      MRN: [id number]  Encounter Provider: Didi Talley PA-C  Encounter Date: 2024   Encounter department: [facility]    Assessment & Plan  Sciatic nerve pain, left  Patient has had this in the past and it worked out with anti-inflammatories however now she is on Xarelto so at this time since she has failed Tylenol I will give her a Medrol Dosepak and taught her how to do piriformis stretching which will alleviate hopefully symptoms as well.  Orders:    methylPREDNISolone 4 MG tablet therapy pack; Use as directed on package    Paroxysmal atrial fibrillation (HCC)  Patient remains on Xarelto and unable to take anti-inflammatories.            History of Present Illness     Patient presents with:  Back Pain: Pt complaints of left lower back pain radiating down to her thigh and when she sits the pain moves to the other side.    Patient states this all started about 1 month ago when she spent about half hour bending over inside of her washing machine cleaning out her drum as it was getting her close  "dirty and smelling.  She has had this exact episode on the left in the past but at that time she was not on Xarelto was allowed to take Aleve which did help at that time.  Now she is on Xarelto and allowed to only take Tylenol and this is not helping for the discomfort in the past month.  She is getting pain that goes all the way down her leg but still has full range of motion no weakness or giving out.    Back Pain  This is a new problem. The current episode started 1 to 4 weeks ago. The problem occurs daily. The problem has been gradually worsening since onset. The pain is present in the gluteal, lumbar spine, sacro-iliac and thoracic spine. The quality of the pain is described as aching, burning and stabbing. The pain radiates to the left thigh and right thigh. The pain is at a severity of 7/10. The pain is Worse during the day. The symptoms are aggravated by sitting and twisting. Stiffness is present In the morning. Associated symptoms include leg pain. Pertinent negatives include no abdominal pain, bladder incontinence, bowel incontinence, chest pain, dysuria, fever, headaches, numbness, paresis, paresthesias, pelvic pain, perianal numbness, tingling, weakness or weight loss. Risk factors include history of cancer, history of osteoporosis and menopause.         Review of Systems   Constitutional:  Negative for fever and weight loss.   Cardiovascular:  Negative for chest pain.   Gastrointestinal:  Negative for abdominal pain and bowel incontinence.   Genitourinary:  Negative for bladder incontinence, dysuria and pelvic pain.   Musculoskeletal:  Positive for back pain.   Neurological:  Negative for tingling, weakness, numbness, headaches and paresthesias.           Objective     /68 (BP Location: Right arm, Patient Position: Sitting, Cuff Size: Standard)   Pulse 68   Ht 5' 3.75\" (1.619 m)   Wt 57.2 kg (126 lb)   SpO2 99%   BMI 21.80 kg/m²     Physical Exam  Vitals and nursing note reviewed. "   Constitutional:       Appearance: Normal appearance. She is well-developed.   HENT:      Head: Normocephalic and atraumatic.   Eyes:      General: Lids are normal.      Conjunctiva/sclera: Conjunctivae normal.      Pupils: Pupils are equal, round, and reactive to light.   Cardiovascular:      Rate and Rhythm: Normal rate and regular rhythm.      Heart sounds: No murmur heard.  Pulmonary:      Effort: Pulmonary effort is normal.      Breath sounds: Normal breath sounds.   Musculoskeletal:      Comments: Full range of motion, negative straight leg raise, patellar DTRs within normal limits as is strength lower extremities.  Patient does have elicited pain to the left sciatic notch and SI joints.   Skin:     General: Skin is warm and dry.   Neurological:      General: No focal deficit present.      Mental Status: She is alert.      Coordination: Coordination is intact.   Psychiatric:         Mood and Affect: Mood normal.         Behavior: Behavior normal. Behavior is cooperative.         Thought Content: Thought content normal.         Judgment: Judgment normal.

## 2024-09-20 NOTE — PATIENT INSTRUCTIONS
1. Sciatic nerve pain, left  -     methylPREDNISolone 4 MG tablet therapy pack; Use as directed on package  2. Paroxysmal atrial fibrillation (HCC)  Assessment & Plan:  Patient remains on Xarelto and unable to take anti-inflammatories.

## 2024-09-27 DIAGNOSIS — I25.10 NONOBSTRUCTIVE ATHEROSCLEROSIS OF CORONARY ARTERY: ICD-10-CM

## 2024-09-27 DIAGNOSIS — I20.9 ANGINA PECTORIS (HCC): ICD-10-CM

## 2024-09-27 NOTE — TELEPHONE ENCOUNTER
Office received notification from [facility] pharmacy stating that the patient is needing refill on her atorvastatin 20MG. The patient is a patient of Dr. King.

## 2024-09-28 RX ORDER — ATORVASTATIN CALCIUM 20 MG/1
20 TABLET, FILM COATED ORAL DAILY
Qty: 90 TABLET | Refills: 1 | Status: SHIPPED | OUTPATIENT
Start: 2024-09-28

## 2024-10-03 ENCOUNTER — CLINICAL SUPPORT (OUTPATIENT)
Dept: FAMILY MEDICINE CLINIC | Facility: CLINIC | Age: 79
End: 2024-10-03
Payer: MEDICARE

## 2024-10-03 DIAGNOSIS — Z23 NEED FOR COVID-19 VACCINE: Primary | ICD-10-CM

## 2024-10-03 PROCEDURE — 90480 ADMN SARSCOV2 VAC 1/ONLY CMP: CPT

## 2024-10-03 PROCEDURE — 91320 SARSCV2 VAC 30MCG TRS-SUC IM: CPT

## 2024-10-08 ENCOUNTER — APPOINTMENT (OUTPATIENT)
Dept: LAB | Facility: CLINIC | Age: 79
End: 2024-10-08
Payer: MEDICARE

## 2024-10-08 DIAGNOSIS — M81.0 AGE-RELATED OSTEOPOROSIS WITHOUT CURRENT PATHOLOGICAL FRACTURE: ICD-10-CM

## 2024-10-08 DIAGNOSIS — I10 ESSENTIAL HYPERTENSION: ICD-10-CM

## 2024-10-08 DIAGNOSIS — K52.1 DIARRHEA DUE TO DRUG: ICD-10-CM

## 2024-10-08 DIAGNOSIS — E78.00 HYPERCHOLESTEROLEMIA: ICD-10-CM

## 2024-10-08 LAB
25(OH)D3 SERPL-MCNC: 81.6 NG/ML (ref 30–100)
ALBUMIN SERPL BCG-MCNC: 3.8 G/DL (ref 3.5–5)
ALP SERPL-CCNC: 78 U/L (ref 34–104)
ALT SERPL W P-5'-P-CCNC: 27 U/L (ref 7–52)
ANION GAP SERPL CALCULATED.3IONS-SCNC: 7 MMOL/L (ref 4–13)
AST SERPL W P-5'-P-CCNC: 21 U/L (ref 13–39)
BASOPHILS # BLD AUTO: 0.04 THOUSANDS/ΜL (ref 0–0.1)
BASOPHILS NFR BLD AUTO: 1 % (ref 0–1)
BILIRUB SERPL-MCNC: 0.6 MG/DL (ref 0.2–1)
BUN SERPL-MCNC: 24 MG/DL (ref 5–25)
CALCIUM SERPL-MCNC: 9.2 MG/DL (ref 8.4–10.2)
CHLORIDE SERPL-SCNC: 104 MMOL/L (ref 96–108)
CHOLEST SERPL-MCNC: 141 MG/DL
CO2 SERPL-SCNC: 29 MMOL/L (ref 21–32)
CREAT SERPL-MCNC: 0.75 MG/DL (ref 0.6–1.3)
EOSINOPHIL # BLD AUTO: 0.09 THOUSAND/ΜL (ref 0–0.61)
EOSINOPHIL NFR BLD AUTO: 2 % (ref 0–6)
ERYTHROCYTE [DISTWIDTH] IN BLOOD BY AUTOMATED COUNT: 13.7 % (ref 11.6–15.1)
GFR SERPL CREATININE-BSD FRML MDRD: 77 ML/MIN/1.73SQ M
GLUCOSE P FAST SERPL-MCNC: 92 MG/DL (ref 65–99)
HCT VFR BLD AUTO: 40.9 % (ref 34.8–46.1)
HDLC SERPL-MCNC: 48 MG/DL
HGB BLD-MCNC: 13.2 G/DL (ref 11.5–15.4)
IMM GRANULOCYTES # BLD AUTO: 0.01 THOUSAND/UL (ref 0–0.2)
IMM GRANULOCYTES NFR BLD AUTO: 0 % (ref 0–2)
LDLC SERPL CALC-MCNC: 78 MG/DL (ref 0–100)
LYMPHOCYTES # BLD AUTO: 2.15 THOUSANDS/ΜL (ref 0.6–4.47)
LYMPHOCYTES NFR BLD AUTO: 41 % (ref 14–44)
MCH RBC QN AUTO: 29.9 PG (ref 26.8–34.3)
MCHC RBC AUTO-ENTMCNC: 32.3 G/DL (ref 31.4–37.4)
MCV RBC AUTO: 93 FL (ref 82–98)
MONOCYTES # BLD AUTO: 0.39 THOUSAND/ΜL (ref 0.17–1.22)
MONOCYTES NFR BLD AUTO: 7 % (ref 4–12)
NEUTROPHILS # BLD AUTO: 2.61 THOUSANDS/ΜL (ref 1.85–7.62)
NEUTS SEG NFR BLD AUTO: 49 % (ref 43–75)
NRBC BLD AUTO-RTO: 0 /100 WBCS
PLATELET # BLD AUTO: 225 THOUSANDS/UL (ref 149–390)
PMV BLD AUTO: 9.7 FL (ref 8.9–12.7)
POTASSIUM SERPL-SCNC: 4.1 MMOL/L (ref 3.5–5.3)
PROT SERPL-MCNC: 6.8 G/DL (ref 6.4–8.4)
RBC # BLD AUTO: 4.41 MILLION/UL (ref 3.81–5.12)
SODIUM SERPL-SCNC: 140 MMOL/L (ref 135–147)
TRIGL SERPL-MCNC: 74 MG/DL
WBC # BLD AUTO: 5.29 THOUSAND/UL (ref 4.31–10.16)

## 2024-10-08 PROCEDURE — 85025 COMPLETE CBC W/AUTO DIFF WBC: CPT

## 2024-10-08 PROCEDURE — 82306 VITAMIN D 25 HYDROXY: CPT

## 2024-10-08 PROCEDURE — 80061 LIPID PANEL: CPT

## 2024-10-08 PROCEDURE — 80053 COMPREHEN METABOLIC PANEL: CPT

## 2024-10-08 PROCEDURE — 36415 COLL VENOUS BLD VENIPUNCTURE: CPT

## 2024-10-17 ENCOUNTER — RA CDI HCC (OUTPATIENT)
Dept: OTHER | Facility: HOSPITAL | Age: 79
End: 2024-10-17

## 2024-10-23 ENCOUNTER — OFFICE VISIT (OUTPATIENT)
Dept: FAMILY MEDICINE CLINIC | Facility: CLINIC | Age: 79
End: 2024-10-23
Payer: MEDICARE

## 2024-10-23 VITALS
BODY MASS INDEX: 21.37 KG/M2 | SYSTOLIC BLOOD PRESSURE: 118 MMHG | TEMPERATURE: 97.7 F | HEIGHT: 64 IN | WEIGHT: 125.2 LBS | OXYGEN SATURATION: 98 % | DIASTOLIC BLOOD PRESSURE: 62 MMHG | HEART RATE: 78 BPM

## 2024-10-23 DIAGNOSIS — M81.0 AGE-RELATED OSTEOPOROSIS WITHOUT CURRENT PATHOLOGICAL FRACTURE: ICD-10-CM

## 2024-10-23 DIAGNOSIS — E78.00 HYPERCHOLESTEROLEMIA: ICD-10-CM

## 2024-10-23 DIAGNOSIS — I48.0 PAROXYSMAL ATRIAL FIBRILLATION (HCC): ICD-10-CM

## 2024-10-23 DIAGNOSIS — I10 ESSENTIAL HYPERTENSION: Primary | ICD-10-CM

## 2024-10-23 DIAGNOSIS — F41.1 GENERALIZED ANXIETY DISORDER: ICD-10-CM

## 2024-10-23 PROCEDURE — G2211 COMPLEX E/M VISIT ADD ON: HCPCS | Performed by: PHYSICIAN ASSISTANT

## 2024-10-23 PROCEDURE — 99214 OFFICE O/P EST MOD 30 MIN: CPT | Performed by: PHYSICIAN ASSISTANT

## 2024-10-23 NOTE — ASSESSMENT & PLAN NOTE
Vitamin D is excellent at 81 continue supplementation.  Patient declining further DEXA's as she will not medicate for this diagnosis.  Encouraged increase in calcium to 1200 mg daily weightbearing exercises and avoidance of excessive alcohol or caffeine.

## 2024-10-23 NOTE — ASSESSMENT & PLAN NOTE
Patient is on Lipitor 20 keeping her LDL at goal at 78.  HDL slightly low at 48 increase activity recheck 6 months. Pt. takes the dog for a walk three times a day.     Orders:    Comprehensive metabolic panel; Future    Lipid Panel with Direct LDL reflex; Future

## 2024-10-23 NOTE — PROGRESS NOTES
Ambulatory Visit  Name: Zenia Youssef      : 1945      MRN: [id number]  Encounter Provider: Didi Talley PA-C  Encounter Date: 10/23/2024   Encounter department: [facility]    Assessment & Plan  Essential hypertension  Blood pressure excellent on Cardizem.         Age-related osteoporosis without current pathological fracture  Vitamin D is excellent at 81 continue supplementation.  Patient declining further DEXA's as she will not medicate for this diagnosis.  Encouraged increase in calcium to 1200 mg daily weightbearing exercises and avoidance of excessive alcohol or caffeine.          Hypercholesterolemia  Patient is on Lipitor 20 keeping her LDL at goal at 78.  HDL slightly low at 48 increase activity recheck 6 months. Pt. takes the dog for a walk three times a day.     Orders:    Comprehensive metabolic panel; Future    Lipid Panel with Direct LDL reflex; Future    Paroxysmal atrial fibrillation (HCC)  Continues with cardiology and Xarelto 20 mg daily.         Generalized anxiety disorder  Has Xanax as needed.             History of Present Illness       Zenia Youssef is here for chronic conditions f/u. Pt. had labs done prior to today's visit which included Recent Results (from the past 672 hour(s))  -Comprehensive metabolic panel:   Collection Time: 10/08/24  7:48 AM       Result                      Value             Ref Range           Sodium                      140               135 - 147 mm*       Potassium                   4.1               3.5 - 5.3 mm*       Chloride                    104               96 - 108 mmo*       CO2                         29                21 - 32 mmol*       ANION GAP                   7                 4 - 13 mmol/L       BUN                         24                5 - 25 mg/dL        Creatinine                  0.75              0.60 - 1.30 *       Glucose, Fasting            92                65 - 99 mg/dL       Calcium                      9.2               8.4 - 10.2 m*       AST                         21                13 - 39 U/L         ALT                         27                7 - 52 U/L          Alkaline Phosphatase        78                34 - 104 U/L        Total Protein               6.8               6.4 - 8.4 g/*       Albumin                     3.8               3.5 - 5.0 g/*       Total Bilirubin             0.60              0.20 - 1.00 *       eGFR                        77                ml/min/1.73s*  -CBC and differential:   Collection Time: 10/08/24  7:48 AM       Result                      Value             Ref Range           WBC                         5.29              4.31 - 10.16*       RBC                         4.41              3.81 - 5.12 *       Hemoglobin                  13.2              11.5 - 15.4 *       Hematocrit                  40.9              34.8 - 46.1 %       MCV                         93                82 - 98 fL          MCH                         29.9              26.8 - 34.3 *       MCHC                        32.3              31.4 - 37.4 *       RDW                         13.7              11.6 - 15.1 %       MPV                         9.7               8.9 - 12.7 fL       Platelets                   225               149 - 390 Th*       nRBC                        0                 /100 WBCs           Segmented %                 49                43 - 75 %           Immature Grans %            0                 0 - 2 %             Lymphocytes %               41                14 - 44 %           Monocytes %                 7                 4 - 12 %            Eosinophils Relative        2                 0 - 6 %             Basophils Relative          1                 0 - 1 %             Absolute Neutrophils        2.61              1.85 - 7.62 *       Absolute Immature Grans     0.01              0.00 - 0.20 *       Absolute Lymphocytes        2.15              0.60 -  "4.47 *       Absolute Monocytes          0.39              0.17 - 1.22 *       Eosinophils Absolute        0.09              0.00 - 0.61 *       Basophils Absolute          0.04              0.00 - 0.10 *  -Lipid Panel with Direct LDL reflex:   Collection Time: 10/08/24  7:48 AM       Result                      Value             Ref Range           Cholesterol                 141               See Comment *       Triglycerides               74                See Comment *       HDL, Direct                 48 (L)            >=50 mg/dL          LDL Calculated              78                0 - 100 mg/dL  -Vitamin D 25 hydroxy:   Collection Time: 10/08/24  7:48 AM       Result                      Value             Ref Range           Vit D, 25-Hydroxy           81.6              30.0 - 100.0*            Review of Systems   Constitutional: Negative.    HENT: Negative.     Eyes: Negative.    Respiratory: Negative.     Cardiovascular: Negative.    Gastrointestinal: Negative.    Endocrine: Negative.    Genitourinary: Negative.    Musculoskeletal: Negative.    Skin: Negative.    Allergic/Immunologic: Negative.    Neurological: Negative.    Hematological: Negative.    Psychiatric/Behavioral: Negative.             Objective     /62 (BP Location: Right arm, Patient Position: Sitting, Cuff Size: Adult)   Pulse 78   Temp 97.7 °F (36.5 °C) (Temporal)   Ht 5' 3.75\" (1.619 m)   Wt 56.8 kg (125 lb 3.2 oz)   SpO2 98%   BMI 21.66 kg/m²     Physical Exam  Vitals and nursing note reviewed.   Constitutional:       Appearance: Normal appearance. She is well-developed.   HENT:      Head: Normocephalic and atraumatic.   Eyes:      General: Lids are normal.      Conjunctiva/sclera: Conjunctivae normal.      Pupils: Pupils are equal, round, and reactive to light.   Cardiovascular:      Rate and Rhythm: Normal rate. Rhythm irregularly irregular.      Heart sounds: Murmur heard.      Systolic murmur is present with a grade of 3/6. "   Pulmonary:      Effort: Pulmonary effort is normal.      Breath sounds: Normal breath sounds.   Skin:     General: Skin is warm and dry.   Neurological:      General: No focal deficit present.      Mental Status: She is alert.      Coordination: Coordination is intact.   Psychiatric:         Mood and Affect: Mood normal.         Behavior: Behavior normal. Behavior is cooperative.         Thought Content: Thought content normal.         Judgment: Judgment normal.

## 2024-10-23 NOTE — PATIENT INSTRUCTIONS
1. Essential hypertension  Assessment & Plan:  Blood pressure excellent on Cardizem.         2. Age-related osteoporosis without current pathological fracture  Assessment & Plan:  Vitamin D is excellent at 81 continue supplementation.  Patient declining further DEXA's as she will not medicate for this diagnosis.  Encouraged increase in calcium to 1200 mg daily weightbearing exercises and avoidance of excessive alcohol or caffeine.         3. Hypercholesterolemia  Assessment & Plan:  Patient is on Lipitor 20 keeping her LDL at goal at 78.  HDL slightly low at 48 increase activity recheck 6 months.         Orders:  -     Comprehensive metabolic panel; Future; Expected date: 04/23/2025  -     Lipid Panel with Direct LDL reflex; Future; Expected date: 04/23/2025  4. Paroxysmal atrial fibrillation (HCC)  Assessment & Plan:  Continues with cardiology and Xarelto 20 mg daily.         5. Generalized anxiety disorder  Assessment & Plan:  Has Xanax as needed.

## 2024-12-20 DIAGNOSIS — I48.0 PAF (PAROXYSMAL ATRIAL FIBRILLATION) (HCC): ICD-10-CM

## 2024-12-21 RX ORDER — BISOPROLOL FUMARATE 5 MG/1
2.5 TABLET, FILM COATED ORAL DAILY
Qty: 45 TABLET | Refills: 3 | Status: SHIPPED | OUTPATIENT
Start: 2024-12-21

## 2024-12-26 DIAGNOSIS — I48.91 ATRIAL FIBRILLATION WITH RAPID VENTRICULAR RESPONSE (HCC): ICD-10-CM

## 2024-12-26 DIAGNOSIS — I48.91 NEW ONSET ATRIAL FIBRILLATION (HCC): ICD-10-CM

## 2024-12-26 RX ORDER — DILTIAZEM HYDROCHLORIDE 240 MG/1
240 CAPSULE, COATED, EXTENDED RELEASE ORAL DAILY
Qty: 90 CAPSULE | Refills: 1 | Status: SHIPPED | OUTPATIENT
Start: 2024-12-26

## 2024-12-26 NOTE — TELEPHONE ENCOUNTER
Change pharmacy-insurance is changing in Jan and has to change pharmacies.     Reason for call:   [x] Refill   [] Prior Auth  [] Other:     Office:   [] PCP/Provider -   [x] Specialty/Provider - [facility]  Authorized By: Cosmo King MD      Medication: diltiazem (CARDIZEM CD) 240 mg 24 hr capsule    Dose/Frequency: Take 1 capsule (240 mg total) by mouth daily,    Quantity: 90 capsule    Pharmacy: [address]     Does the patient have enough for 3 days?   [x] Yes   [] No - Send as HP to POD    Reason for call:   [x] Refill   [] Prior Auth  [] Other:     Office:   [] PCP/Provider -   [x] Specialty[facility]Provider [facility] [place]  Authorized By: Cosmo King MD      Medication: Xarelto 20 MG tablet    Dose/Frequency: TAKE 1 TABLET(20 MG) BY MOUTH DAILY WITH BREAKFAST    Quantity: 30 tablets     Pharmacy: [address]     Does the patient have enough for 3 days?   [x] Yes   [] No - Send as HP to POD

## 2025-01-01 DIAGNOSIS — F41.1 GENERALIZED ANXIETY DISORDER: ICD-10-CM

## 2025-01-02 RX ORDER — ALPRAZOLAM 0.25 MG/1
0.25 TABLET ORAL DAILY PRN
Qty: 30 TABLET | Refills: 0 | Status: SHIPPED | OUTPATIENT
Start: 2025-01-02

## 2025-01-10 DIAGNOSIS — I20.9 ANGINA PECTORIS (HCC): ICD-10-CM

## 2025-01-10 DIAGNOSIS — I48.0 PAF (PAROXYSMAL ATRIAL FIBRILLATION) (HCC): ICD-10-CM

## 2025-01-10 DIAGNOSIS — I25.10 NONOBSTRUCTIVE ATHEROSCLEROSIS OF CORONARY ARTERY: ICD-10-CM

## 2025-01-13 RX ORDER — ATORVASTATIN CALCIUM 20 MG/1
20 TABLET, FILM COATED ORAL DAILY
Qty: 90 TABLET | Refills: 1 | Status: SHIPPED | OUTPATIENT
Start: 2025-01-13

## 2025-01-13 RX ORDER — BISOPROLOL FUMARATE 5 MG/1
2.5 TABLET, FILM COATED ORAL DAILY
Qty: 15 TABLET | Refills: 2 | Status: SHIPPED | OUTPATIENT
Start: 2025-01-13

## 2025-01-22 ENCOUNTER — TELEPHONE (OUTPATIENT)
Dept: CARDIOLOGY CLINIC | Facility: CLINIC | Age: 80
End: 2025-01-22

## 2025-01-22 NOTE — TELEPHONE ENCOUNTER
Left messge patient voice mail.    Faxed received from [facility] to refill diltiazem CD 240mg script.  In December Dr. King approved a Diltiazem CD 240mg script to [facility]. 90 day supply with one refill.    Please call the office to confirm correct pharmacy to fill scripts.

## 2025-01-22 NOTE — TELEPHONE ENCOUNTER
Patient called the RX Refill Line. Message is being forwarded to the office.     Patient is returning a call.  [facility] but she does not need the refill at this time as the med was sent to [facility] in December and she picked it up.     Please contact patient at [phone] with any additional questions

## 2025-01-23 NOTE — TELEPHONE ENCOUNTER
Faxed [facility] request  Not Authorized to fill Diltiazem.  Patient using another pharmacy    Fax [phone]

## 2025-01-24 DIAGNOSIS — F41.1 GENERALIZED ANXIETY DISORDER: ICD-10-CM

## 2025-01-25 NOTE — ASSESSMENT & PLAN NOTE
Atrial fibrillation first noted 7/12/2023 when hospitalized with atrial fibrillation with RVR     Patient has occasional palpitations.  Sometimes they will last as long as 3 days.  She has no dizziness or lightheadedness with these palpitations and is able to go about her normal activities.    Consider Zio patch monitor in the future.    Xarelto 20 mg daily  Diltiazem  mg daily  Bisoprolol 5 mg daily

## 2025-01-25 NOTE — ASSESSMENT & PLAN NOTE
Case reviewed with Dr Davidson who feels that the valve may need to be replaced. I do not feel patient is symptomatic enough to warrant replacement    Not on ARB due to abdominal pain which Dr. Miller felt was from the losartan     7/12/2023 echocardiogram: Normal left ventricular systolic function, EF 60% atrial fibrillation with diastolic dysfunction.  Sigmoid shaped septum.  Severe LA enlargement.  Mild RA dilated.  Aortic sclerosis.  Valve posterior leaflet prolapse with moderate regurgitation.  Mild to moderate tricuspid regurgitation.  PASP 59 mmHg.Z    1/22/2024 DEANGELO: LVEF 60%.  RV normal.  LA and RA dilated.  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      Tried placing patient on carvedilol 3.125 mg 2 times a day and she called stating it is making dizzy and she is very SOB, worse than without the medication. She states she cannot drive taking this.  Coreg was discontinued.    GDMT:  Beta-blocker: Bisoprolol 2.5 mg daily (one half 5 mg tablet)  ACE/ARB/ARNI: Was on losartan and GI felt it was the cause of her abdominal pain.  Losartan discontinued and the pain resolved.  (Allergy to ARB's)  SGLT 2 agonist: Discussed with her starting Jardiance but she is on a fixed income and finding the co-pay for a month of Eliquis difficult.  I have not pushed her to do this since she is predominantly asymptomatic  Mineralocorticoid antagonist have not tried since patient runs a low blood pressure and has never actually been in congestive heart failure

## 2025-01-27 ENCOUNTER — OFFICE VISIT (OUTPATIENT)
Dept: CARDIOLOGY CLINIC | Facility: CLINIC | Age: 80
End: 2025-01-27
Payer: MEDICARE

## 2025-01-27 VITALS
WEIGHT: 124.8 LBS | SYSTOLIC BLOOD PRESSURE: 120 MMHG | HEART RATE: 58 BPM | DIASTOLIC BLOOD PRESSURE: 62 MMHG | BODY MASS INDEX: 22.11 KG/M2 | HEIGHT: 63 IN

## 2025-01-27 DIAGNOSIS — E78.00 HYPERCHOLESTEROLEMIA: ICD-10-CM

## 2025-01-27 DIAGNOSIS — I34.0 NONRHEUMATIC MITRAL VALVE REGURGITATION: ICD-10-CM

## 2025-01-27 DIAGNOSIS — I25.10 NONOBSTRUCTIVE ATHEROSCLEROSIS OF CORONARY ARTERY: ICD-10-CM

## 2025-01-27 DIAGNOSIS — I35.8 AORTIC VALVE SCLEROSIS: ICD-10-CM

## 2025-01-27 DIAGNOSIS — I48.0 PAROXYSMAL ATRIAL FIBRILLATION (HCC): Primary | ICD-10-CM

## 2025-01-27 PROCEDURE — 93000 ELECTROCARDIOGRAM COMPLETE: CPT | Performed by: INTERNAL MEDICINE

## 2025-01-27 PROCEDURE — 99214 OFFICE O/P EST MOD 30 MIN: CPT | Performed by: INTERNAL MEDICINE

## 2025-01-27 RX ORDER — ALPRAZOLAM 0.25 MG/1
0.25 TABLET ORAL DAILY PRN
Qty: 30 TABLET | Refills: 0 | Status: SHIPPED | OUTPATIENT
Start: 2025-01-27

## 2025-01-27 NOTE — PROGRESS NOTES
[facility]  [address] [place] [address]  Phone#  [phone]   Fax#  [phone]  *-*-*-*-*-*-*-*-*-*-*-*-*-*-*-*-*-*-*-*-*-*-*-*-*-*-*-*-*-*-*-*-*-*-*-*-*-*-*-*-*-*-*-*-*-*-*-*-*-*-*-*-*-*                                   Cardiology Follow Up      ENCOUNTER DATE: 25 9:40 AM  PATIENT NAME: Zenia Youssef   : 1945    MRN: [id number]  AGE:79 y.o.      SEX: female  ENCOUNTER PROVIDER:Cosmo King MD     PRIMARY CARE PHYSICIAN: Didi Talley PA-C    ACTIVE DIAGNOSIS AND PLAN    1. Paroxysmal atrial fibrillation (HCC)  Assessment & Plan:  Atrial fibrillation first noted 2023 when hospitalized with atrial fibrillation with RVR     Xarelto 20 mg daily  Diltiazem  mg daily  Bisoprolol 5 mg daily  2. Nonobstructive atherosclerosis of coronary artery  Assessment & Plan:  2022 cardiac catheterization: Ostial circumflex lesion 50% stenosis.   3. mitral valve prolapse, torn chord, with moderate to severe regurgitation  Assessment & Plan:  Case reviewed with Dr Davidson who feels that the valve may need to be replaced. I do not feel patient is symptomatic enough to warrant replacement    Not on ARB due to abdominal pain which Dr. Miller felt was from the losartan     Tried placing patient on carvedilol 3.125 mg 2 times a day and she called stating it is making dizzy and she is very SOB, worse than without the medication. She states she cannot drive taking this.  Coreg was discontinued.  4. Hypercholesterolemia  Assessment & Plan:  Lipid profile from 10/8/2024  Continue atorvastatin 20 mg daily  5. Aortic valve sclerosis  Assessment & Plan:  Aortic valve murmur noted on exam and aortic sclerosis noted on echo     INTERVAL HISTORY:        ***    DISCUSSION/PLAN:          ***    CARDIOLOGY HISTORY AND TESTING  Patient was 1st seen on 2021. Patient is a is a 76-year-old female who developed symptoms of chest tightness radiating into her neck and jaw  with shortness of breath on physical exertion such as going up a flight of stairs.  The discomfort would melba with rest.  Her primary care physician ordered a stress echocardiogram which was nondiagnostic and equivocal for myocardial ischemia.  Mild mitral regurgitation was noted on the echocardiogram.  Patient states that since the stress test, her exertional chest tightness and shortness of breath has been much less severe.  Patient has no family history of coronary artery disease.  A sister has a murmur.  She is a lifetime nonsmoker.  Patient had a melanoma removed 11 years ago and has had no evidence of recurrence.    06/23/2021 nuclear stress test: Resting hypertension with exaggerated hypertensive response to exercise negative treadmill stress test for angina pectoris but with abnormal ST depression with exercise. Exercise induced ventricular ectopy. Normal LV systolic function, EF 77%. Normal tomographic perfusion series.    06/01/2021 echocardiogram:  Normal LV function EF 60%, mild concentric LVH.  Grade 1 diastolic dysfunction.  Mild-to-moderate left atrial enlargement.  Moderate mitral regurgitation.    02/24/2022 cardiac catheterization: Ostial circumflex lesion 50% stenosis.    7/12-7/14/2023 [facility] new onset atrial fibrillation with RVR    7/12/2023 echocardiogram: Normal left ventricular systolic function, EF 60% atrial fibrillation with diastolic dysfunction.  Sigmoid shaped septum.  Severe LA enlargement.  Mild RA dilated.  Aortic sclerosis.  Valve posterior leaflet prolapse with moderate regurgitation.  Mild to moderate tricuspid regurgitation.  PASP 59 mmHg.Z    1/22/2024 [facility]: LVEF 60%.  RV normal.  LA and RA dilated.  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      ACTIVE PROBLEM LIST  Patient Active Problem List   Diagnosis    Generalized anxiety disorder    Diverticulosis    GERD without esophagitis    Hiatal hernia    Hypercholesterolemia    Essential  "hypertension    Irritable bowel syndrome    Malignant melanoma of skin (HCC)    Age-related osteoporosis without current pathological fracture    Squamous cell carcinoma of skin    Splenic artery aneurysm (HCC)    Other specified disorders of rotator cuff syndrome of shoulder and allied disorders    mitral valve prolapse, torn chord, with moderate to severe regurgitation    Nonobstructive atherosclerosis of coronary artery    Paroxysmal atrial fibrillation (HCC)    Aortic valve sclerosis    Allergic rhinitis    Vertigo       TODAY'S EKG  No results found for this visit on 01/27/25.      Lab Studies:    Lab Results   Component Value Date    CHOLESTEROL 141 10/08/2024    CHOLESTEROL 140 04/11/2024    CHOLESTEROL 132 10/03/2023     Lab Results   Component Value Date    TRIG 74 10/08/2024    TRIG 72 04/11/2024    TRIG 78 10/03/2023     Lab Results   Component Value Date    HDL 48 (L) 10/08/2024    HDL 47 (L) 04/11/2024    HDL 48 (L) 10/03/2023     Lab Results   Component Value Date    LDLCALC 78 10/08/2024    LDLCALC 79 04/11/2024    LDLCALC 68 10/03/2023     No results found for: \"NONHDL\"  Lab Results   Component Value Date    LDLDIRECT 112 05/07/2014         No results found for: \"HGBA1C\"   Lab Results   Component Value Date    EGFR 77 10/08/2024    EGFR 81 04/11/2024    EGFR 69 10/03/2023    SODIUM 140 10/08/2024    SODIUM 142 04/11/2024    SODIUM 139 10/03/2023    K 4.1 10/08/2024    K 4.1 04/11/2024    K 4.2 10/03/2023     10/08/2024     04/11/2024     10/03/2023    CO2 29 10/08/2024    CO2 28 04/11/2024    CO2 28 10/03/2023    ANIONGAP 5 05/07/2014    BUN 24 10/08/2024    BUN 26 (H) 04/11/2024    BUN 26 (H) 10/03/2023    CREATININE 0.75 10/08/2024    CREATININE 0.72 04/11/2024    CREATININE 0.82 10/03/2023     Lab Results   Component Value Date    WBC 5.29 10/08/2024    WBC 7.16 10/03/2023    WBC 6.71 07/20/2023    HGB 13.2 10/08/2024    HGB 14.0 10/03/2023    HGB 12.9 07/20/2023    HCT 40.9 " "10/08/2024    HCT 42.3 10/03/2023    HCT 39.8 07/20/2023    MCV 93 10/08/2024    MCV 91 10/03/2023    MCV 92 07/20/2023    MCH 29.9 10/08/2024    MCH 30.2 10/03/2023    MCH 29.9 07/20/2023    MCHC 32.3 10/08/2024    MCHC 33.1 10/03/2023    MCHC 32.4 07/20/2023     10/08/2024     10/03/2023     07/20/2023      Lab Results   Component Value Date    GLUCOSE 98 05/07/2014    CALCIUM 9.2 10/08/2024    CALCIUM 9.3 04/11/2024    CALCIUM 9.4 10/03/2023    ALB 3.8 10/08/2024    ALB 4.1 04/11/2024    ALB 4.2 10/03/2023    TP 6.8 10/08/2024    TP 7.4 04/11/2024    TP 7.2 10/03/2023    AST 21 10/08/2024    AST 23 04/11/2024    AST 18 10/03/2023    ALT 27 10/08/2024    ALT 26 04/11/2024    ALT 28 10/03/2023    ALKPHOS 78 10/08/2024    ALKPHOS 80 04/11/2024    ALKPHOS 76 10/03/2023    BILITOT 0.37 05/07/2014     No components found for: \"CALCIUMC\"  No results found for: \"TSH\"  No components found for: \"[id number]\"  No components found for: \"[id number]\"  No results found for: \"FREET4\"  Lab Results   Component Value Date     (H) 07/12/2023     No results found for: \"DDIMER\"  No results found for: \"FERRITIN\", \"IRON\", \"TIBC\"  No results found for: \"CRP\", \"SEDRATE\"  No components found for: \"IRONSAT\"  No components found for: \"OCCULTBLOODSTOOL\"      Current Outpatient Medications:     ALPRAZolam (XANAX) 0.25 mg tablet, Take 1 tablet (0.25 mg total) by mouth daily as needed for anxiety, Disp: 30 tablet, Rfl: 0    atorvastatin (LIPITOR) 20 mg tablet, Take 1 tablet (20 mg total) by mouth daily, Disp: 90 tablet, Rfl: 1    BIOTIN PO, Take by mouth, Disp: , Rfl:     bisoprolol (ZEBETA) 5 mg tablet, Take 0.5 tablets (2.5 mg total) by mouth daily, Disp: 15 tablet, Rfl: 2    calcium carbonate (OS-BRYCE) 600 MG tablet, Take 600 mg by mouth 2 (two) times a day with meals, Disp: , Rfl:     Cholecalciferol (VITAMIN D3) 1000 units CAPS, Take 2 capsules by mouth daily, Disp: , Rfl:     diltiazem " (CARDIZEM CD) 240 mg 24 hr capsule, Take 1 capsule (240 mg total) by mouth daily, Disp: 90 capsule, Rfl: 1    Lactobacillus Rhamnosus, GG, (CULTURELLE PO), Take by mouth daily, Disp: , Rfl:     melatonin 1 mg, Take 1 mg by mouth daily at bedtime as needed, Disp: , Rfl:     methylPREDNISolone 4 MG tablet therapy pack, Use as directed on package, Disp: 21 each, Rfl: 0    rivaroxaban (Xarelto) 20 mg tablet, Take 1 tablet (20 mg total) by mouth daily with breakfast, Disp: 30 tablet, Rfl: 5    Vitamin Mixture ([place]-C) 500-60 MG TABS, Take by mouth, Disp: , Rfl:   Allergies   Allergen Reactions    Losartan Abdominal Pain     Recent demonstrates that this side effect will be experienced by the entire class of drugs.    Macrobid [Nitrofurantoin] GI Intolerance    Penicillins Other (See Comments)     Childhood. Per pt, unsure of reaction    Protonix [Pantoprazole] Nausea Only    Wound Dressing Adhesive Other (See Comments)     bandaid if on for more than 24 hours       Past Medical History:   Diagnosis Date    Anxiety     Diverticulosis     GERD (gastroesophageal reflux disease)     Heart murmur last year    Hiatal hernia     Hypertension     Melanoma (HCC)     Osteoporosis      Social History     Socioeconomic History    Marital status: /Civil Union     Spouse name: Not on file    Number of children: Not on file    Years of education: Not on file    Highest education level: Not on file   Occupational History    Occupation: retired - [employer]   Tobacco Use    Smoking status: Never    Smokeless tobacco: Never    Tobacco comments:     No secondhand smoke exposure    Vaping Use    Vaping status: Never Used   Substance and Sexual Activity    Alcohol use: Not Currently     Comment: very rare socially    Drug use: Never    Sexual activity: Not Currently     Partners: Male   Other Topics Concern    Not on file   Social History Narrative    Not on file     Social Drivers of Health     Financial Resource  Strain: Low Risk  (4/10/2023)    Overall Financial Resource Strain (CARDIA)     Difficulty of Paying Living Expenses: Not hard at all   Food Insecurity: No Food Insecurity (4/24/2024)    Nursing - Inadequate Food Risk Classification     Worried About Running Out of Food in the Last Year: Never true     Ran Out of Food in the Last Year: Never true     Ran Out of Food in the Last Year: Not on file   Transportation Needs: No Transportation Needs (4/24/2024)    PRAPARE - Transportation     Lack of Transportation (Medical): No     Lack of Transportation (Non-Medical): No   Physical Activity: Not on file   Stress: Not on file   Social Connections: Not on file   Intimate Partner Violence: Not on file   Housing Stability: Low Risk  (4/24/2024)    Housing Stability Vital Sign     Unable to Pay for Housing in the Last Year: No     Number of Times Moved in the Last Year: 1     Homeless in the Last Year: No      Family History   Problem Relation Age of Onset    Uterine cancer Mother     Cancer Mother     Prostate cancer Father     Cancer Father     Osteoporosis Sister      Past Surgical History:   Procedure Laterality Date    CARDIAC CATHETERIZATION N/A 02/24/2022    Procedure: Cardiac catheterization;  Surgeon: Harinder Calvo MD;  Location: [facility];  Service: Cardiology    CARDIAC CATHETERIZATION N/A 02/24/2022    Procedure: Cardiac Coronary Angiogram;  Surgeon: Harinder Calvo MD;  Location: [facility];  Service: Cardiology    [facility]; normal stomach; non-severe esophagitis. Bx: Neg. H. pylori, ulceration and dysplasia.    EGD AND COLONOSCOPY  05/14/2014    NORMAL/HP    HYSTERECTOMY  ?       PREVIOUS WEIGHTS:   Wt Readings from Last 10 Encounters:   10/23/24 56.8 kg (125 lb 3.2 oz)   09/20/24 57.2 kg (126 lb)   08/23/24 58 kg (127 lb 12.8 oz)   05/06/24 57 kg (125 lb 9.6 oz)   04/29/24 57 kg (125 lb 9.6 oz)   04/24/24 56.7 kg (125 lb)   04/22/24 57.2 kg (126  "lb 3.2 oz)   04/10/24 57.2 kg (126 lb)   02/20/24 56.9 kg (125 lb 6.4 oz)   02/06/24 54.9 kg (121 lb)        Review of Systems:  Review of Systems    Physical Exam:  There were no vitals taken for this visit.    Physical Exam  Constitutional:       General: She is not in acute distress.     Appearance: She is well-developed.   HENT:      Head: Normocephalic and atraumatic.   Neck:      Thyroid: No thyromegaly.      Vascular: No carotid bruit or JVD.      Trachea: No tracheal deviation.   Cardiovascular:      Rate and Rhythm: Normal rate and regular rhythm.      Pulses: Normal pulses.      Heart sounds: Normal heart sounds. No murmur heard.     No friction rub. No gallop.   Pulmonary:      Effort: Pulmonary effort is normal. No respiratory distress.      Breath sounds: Normal breath sounds. No wheezing, rhonchi or rales.   Chest:      Chest wall: No tenderness.   Abdominal:      General: Bowel sounds are normal. There is no distension.      Palpations: Abdomen is soft.      Tenderness: There is no abdominal tenderness.   Musculoskeletal:         General: Normal range of motion.      Cervical back: Normal range of motion and neck supple.      Right lower leg: No edema.      Left lower leg: No edema.   Skin:     General: Skin is warm and dry.   Neurological:      General: No focal deficit present.      Mental Status: She is alert and oriented to person, place, and time.      Gait: Gait normal.   Psychiatric:         Mood and Affect: Mood normal.         Behavior: Behavior normal.         Thought Content: Thought content normal.         Judgment: Judgment normal.         -------------------------------------------------------------------------------   CARDIAC EP DEVICE REPORT  No results found for this or any previous visit.      ======================================================  Imaging:   {Results Review Statement:63099::\"No pertinent imaging studies reviewed.\"}    Portions of the record may have been created " "with voice recognition software. Occasional wrong word or \"sound a like\" substitutions may have occurred due to the inherent limitations of voice recognition software. Read the chart carefully and recognize, using context, where substitutions have occurred.    SIGNATURES:   Cosmo King MD   "

## 2025-02-11 ENCOUNTER — TELEPHONE (OUTPATIENT)
Dept: CARDIOLOGY CLINIC | Facility: CLINIC | Age: 80
End: 2025-02-11

## 2025-03-25 DIAGNOSIS — I48.0 PAF (PAROXYSMAL ATRIAL FIBRILLATION) (HCC): ICD-10-CM

## 2025-03-26 RX ORDER — BISOPROLOL FUMARATE 5 MG/1
TABLET, FILM COATED ORAL
Qty: 15 TABLET | Refills: 5 | Status: SHIPPED | OUTPATIENT
Start: 2025-03-26

## 2025-04-17 ENCOUNTER — APPOINTMENT (OUTPATIENT)
Dept: LAB | Facility: CLINIC | Age: 80
End: 2025-04-17
Payer: MEDICARE

## 2025-04-17 DIAGNOSIS — E78.00 HYPERCHOLESTEROLEMIA: ICD-10-CM

## 2025-04-17 LAB
ALBUMIN SERPL BCG-MCNC: 4.2 G/DL (ref 3.5–5)
ALP SERPL-CCNC: 81 U/L (ref 34–104)
ALT SERPL W P-5'-P-CCNC: 33 U/L (ref 7–52)
ANION GAP SERPL CALCULATED.3IONS-SCNC: 11 MMOL/L (ref 4–13)
AST SERPL W P-5'-P-CCNC: 24 U/L (ref 13–39)
BILIRUB SERPL-MCNC: 0.58 MG/DL (ref 0.2–1)
BUN SERPL-MCNC: 31 MG/DL (ref 5–25)
CALCIUM SERPL-MCNC: 9.4 MG/DL (ref 8.4–10.2)
CHLORIDE SERPL-SCNC: 103 MMOL/L (ref 96–108)
CHOLEST SERPL-MCNC: 128 MG/DL (ref ?–200)
CO2 SERPL-SCNC: 27 MMOL/L (ref 21–32)
CREAT SERPL-MCNC: 0.82 MG/DL (ref 0.6–1.3)
GFR SERPL CREATININE-BSD FRML MDRD: 68 ML/MIN/1.73SQ M
GLUCOSE P FAST SERPL-MCNC: 82 MG/DL (ref 65–99)
HDLC SERPL-MCNC: 44 MG/DL
LDLC SERPL CALC-MCNC: 70 MG/DL (ref 0–100)
POTASSIUM SERPL-SCNC: 4 MMOL/L (ref 3.5–5.3)
PROT SERPL-MCNC: 7.1 G/DL (ref 6.4–8.4)
SODIUM SERPL-SCNC: 141 MMOL/L (ref 135–147)
TRIGL SERPL-MCNC: 72 MG/DL (ref ?–150)

## 2025-04-17 PROCEDURE — 36415 COLL VENOUS BLD VENIPUNCTURE: CPT

## 2025-04-17 PROCEDURE — 80061 LIPID PANEL: CPT

## 2025-04-17 PROCEDURE — 80053 COMPREHEN METABOLIC PANEL: CPT

## 2025-04-24 ENCOUNTER — RA CDI HCC (OUTPATIENT)
Dept: OTHER | Facility: HOSPITAL | Age: 80
End: 2025-04-24

## 2025-04-28 ENCOUNTER — APPOINTMENT (OUTPATIENT)
Dept: LAB | Facility: CLINIC | Age: 80
End: 2025-04-28
Payer: MEDICARE

## 2025-04-28 ENCOUNTER — OFFICE VISIT (OUTPATIENT)
Dept: FAMILY MEDICINE CLINIC | Facility: CLINIC | Age: 80
End: 2025-04-28
Payer: MEDICARE

## 2025-04-28 VITALS
HEART RATE: 95 BPM | RESPIRATION RATE: 16 BRPM | BODY MASS INDEX: 22.39 KG/M2 | DIASTOLIC BLOOD PRESSURE: 84 MMHG | SYSTOLIC BLOOD PRESSURE: 118 MMHG | TEMPERATURE: 98.2 F | OXYGEN SATURATION: 100 % | HEIGHT: 63 IN | WEIGHT: 126.4 LBS

## 2025-04-28 DIAGNOSIS — K62.5 RECTAL BLEEDING: ICD-10-CM

## 2025-04-28 DIAGNOSIS — K64.9 HEMORRHOIDS, UNSPECIFIED HEMORRHOID TYPE: ICD-10-CM

## 2025-04-28 DIAGNOSIS — R19.7 DIARRHEA, UNSPECIFIED TYPE: Primary | ICD-10-CM

## 2025-04-28 LAB
BASOPHILS # BLD AUTO: 0.04 THOUSANDS/ÂΜL (ref 0–0.1)
BASOPHILS NFR BLD AUTO: 1 % (ref 0–1)
EOSINOPHIL # BLD AUTO: 0.05 THOUSAND/ÂΜL (ref 0–0.61)
EOSINOPHIL NFR BLD AUTO: 1 % (ref 0–6)
ERYTHROCYTE [DISTWIDTH] IN BLOOD BY AUTOMATED COUNT: 14 % (ref 11.6–15.1)
HCT VFR BLD AUTO: 42 % (ref 34.8–46.1)
HGB BLD-MCNC: 13.8 G/DL (ref 11.5–15.4)
IMM GRANULOCYTES # BLD AUTO: 0.04 THOUSAND/UL (ref 0–0.2)
IMM GRANULOCYTES NFR BLD AUTO: 1 % (ref 0–2)
LYMPHOCYTES # BLD AUTO: 2.24 THOUSANDS/ÂΜL (ref 0.6–4.47)
LYMPHOCYTES NFR BLD AUTO: 32 % (ref 14–44)
MCH RBC QN AUTO: 30.1 PG (ref 26.8–34.3)
MCHC RBC AUTO-ENTMCNC: 32.9 G/DL (ref 31.4–37.4)
MCV RBC AUTO: 92 FL (ref 82–98)
MONOCYTES # BLD AUTO: 0.47 THOUSAND/ÂΜL (ref 0.17–1.22)
MONOCYTES NFR BLD AUTO: 7 % (ref 4–12)
NEUTROPHILS # BLD AUTO: 4.15 THOUSANDS/ÂΜL (ref 1.85–7.62)
NEUTS SEG NFR BLD AUTO: 58 % (ref 43–75)
NRBC BLD AUTO-RTO: 0 /100 WBCS
PLATELET # BLD AUTO: 258 THOUSANDS/UL (ref 149–390)
PMV BLD AUTO: 10.2 FL (ref 8.9–12.7)
RBC # BLD AUTO: 4.59 MILLION/UL (ref 3.81–5.12)
WBC # BLD AUTO: 6.99 THOUSAND/UL (ref 4.31–10.16)

## 2025-04-28 PROCEDURE — 36415 COLL VENOUS BLD VENIPUNCTURE: CPT | Performed by: FAMILY MEDICINE

## 2025-04-28 PROCEDURE — 99213 OFFICE O/P EST LOW 20 MIN: CPT | Performed by: FAMILY MEDICINE

## 2025-04-28 PROCEDURE — 85025 COMPLETE CBC W/AUTO DIFF WBC: CPT | Performed by: FAMILY MEDICINE

## 2025-04-28 PROCEDURE — G2211 COMPLEX E/M VISIT ADD ON: HCPCS | Performed by: FAMILY MEDICINE

## 2025-04-28 RX ORDER — DIPHENOXYLATE HYDROCHLORIDE AND ATROPINE SULFATE 2.5; .025 MG/1; MG/1
1 TABLET ORAL 4 TIMES DAILY PRN
Qty: 12 TABLET | Refills: 0 | Status: SHIPPED | OUTPATIENT
Start: 2025-04-28

## 2025-04-28 RX ORDER — HYDROCORTISONE 25 MG/G
CREAM TOPICAL 2 TIMES DAILY
Qty: 28 G | Refills: 1 | Status: SHIPPED | OUTPATIENT
Start: 2025-04-28

## 2025-04-28 RX ORDER — ALPRAZOLAM 0.25 MG
0.25 TABLET ORAL DAILY PRN
Qty: 30 TABLET | Refills: 0 | Status: CANCELLED | OUTPATIENT
Start: 2025-04-28

## 2025-04-28 NOTE — PROGRESS NOTES
"Name: Zenia Youssef      : 1945      MRN: [id number]  Encounter Provider: Kathleen Fregoso MD  Encounter Date: 2025   Encounter department: [facility]  :  Assessment & Plan  Diarrhea, unspecified type  Likely  some  irritation from something  she  ate  Orders:    diphenoxylate-atropine (LOMOTIL) 2.5-0.025 mg per tablet; Take 1 tablet by mouth 4 (four) times a day as needed for diarrhea    CBC and differential    Rectal bleeding    Orders:    hydrocortisone (ANUSOL-HC) 2.5 % rectal cream; Apply topically 2 (two) times a day    CBC and differential    Hemorrhoids, unspecified hemorrhoid type  Diarrhea  flared  uop hemorrhoids   Orders:    hydrocortisone (ANUSOL-HC) 2.5 % rectal cream; Apply topically 2 (two) times a day    CBC and differential           History of Present Illness   Patient presents with:  GI Problem: Patient in office for stomach pain, diarrhea, hemorrhoids with blood onset .,  was  using  preparation H  and  took gas  x         Review of Systems   Constitutional:  Negative for activity change, appetite change and fatigue.   Respiratory:  Negative for shortness of breath.    Cardiovascular:  Negative for chest pain.   Gastrointestinal:  Positive for anal bleeding, blood in stool, diarrhea and rectal pain. Negative for abdominal pain.   Neurological:  Negative for dizziness, light-headedness and headaches.   Hematological:  Negative for adenopathy.       Objective   /84 (BP Location: Right arm, Patient Position: Sitting, Cuff Size: Adult)   Pulse 95   Temp 98.2 °F (36.8 °C) (Temporal)   Resp 16   Ht 5' 3\" (1.6 m)   Wt 57.3 kg (126 lb 6.4 oz)   SpO2 100%   BMI 22.39 kg/m²      Physical Exam  Vitals reviewed.   Constitutional:       Appearance: Normal appearance.   Cardiovascular:      Rate and Rhythm: Normal rate and regular rhythm.      Pulses: Normal pulses.      Heart sounds: Normal heart sounds.   Pulmonary:      Effort: Pulmonary effort is " normal.      Breath sounds: Normal breath sounds.   Abdominal:      General: Abdomen is flat. Bowel sounds are normal. There is no distension.      Palpations: Abdomen is soft.      Tenderness: There is no abdominal tenderness.   Lymphadenopathy:      Cervical: No cervical adenopathy.   Neurological:      Mental Status: She is alert.   Psychiatric:         Mood and Affect: Mood normal.

## 2025-04-29 ENCOUNTER — RESULTS FOLLOW-UP (OUTPATIENT)
Dept: FAMILY MEDICINE CLINIC | Facility: CLINIC | Age: 80
End: 2025-04-29

## 2025-04-30 ENCOUNTER — OFFICE VISIT (OUTPATIENT)
Dept: FAMILY MEDICINE CLINIC | Facility: CLINIC | Age: 80
End: 2025-04-30
Payer: MEDICARE

## 2025-04-30 VITALS
DIASTOLIC BLOOD PRESSURE: 64 MMHG | WEIGHT: 126 LBS | HEART RATE: 85 BPM | BODY MASS INDEX: 22.32 KG/M2 | SYSTOLIC BLOOD PRESSURE: 112 MMHG | HEIGHT: 63 IN | OXYGEN SATURATION: 97 %

## 2025-04-30 DIAGNOSIS — F41.1 GENERALIZED ANXIETY DISORDER: ICD-10-CM

## 2025-04-30 DIAGNOSIS — I10 ESSENTIAL HYPERTENSION: ICD-10-CM

## 2025-04-30 DIAGNOSIS — E78.00 HYPERCHOLESTEROLEMIA: Primary | ICD-10-CM

## 2025-04-30 DIAGNOSIS — C44.92 SQUAMOUS CELL CARCINOMA OF SKIN: ICD-10-CM

## 2025-04-30 DIAGNOSIS — C43.9 MALIGNANT MELANOMA OF SKIN (HCC): ICD-10-CM

## 2025-04-30 DIAGNOSIS — M81.0 AGE-RELATED OSTEOPOROSIS WITHOUT CURRENT PATHOLOGICAL FRACTURE: ICD-10-CM

## 2025-04-30 DIAGNOSIS — I48.0 PAROXYSMAL ATRIAL FIBRILLATION (HCC): ICD-10-CM

## 2025-04-30 PROCEDURE — 99214 OFFICE O/P EST MOD 30 MIN: CPT | Performed by: PHYSICIAN ASSISTANT

## 2025-04-30 PROCEDURE — G2211 COMPLEX E/M VISIT ADD ON: HCPCS | Performed by: PHYSICIAN ASSISTANT

## 2025-04-30 PROCEDURE — G0439 PPPS, SUBSEQ VISIT: HCPCS | Performed by: PHYSICIAN ASSISTANT

## 2025-04-30 RX ORDER — ALPRAZOLAM 0.25 MG
0.25 TABLET ORAL DAILY PRN
Qty: 30 TABLET | Refills: 0 | Status: SHIPPED | OUTPATIENT
Start: 2025-04-30

## 2025-04-30 NOTE — ASSESSMENT & PLAN NOTE
Blood pressure very stable at 112/64.  Orders:  •  Comprehensive metabolic panel; Future  
Continue lifelong Xarelto 20 mg daily continue follow-up with cardiology.     
Has Xanax as needed refills needed today.  PDMP checked verified no red flags.  Orders:  •  ALPRAZolam (XANAX) 0.25 mg tablet; Take 1 tablet (0.25 mg total) by mouth daily as needed for anxiety  
Patient declining further DEXA's as does not wish to treat this diagnosis with medication.  Orders:  •  Vitamin D 25 hydroxy; Future  
Patient is on Lipitor 20 keeping LDL at goal at 70 continue recheck 6 months.  Orders:  •  Lipid Panel with Direct LDL reflex; Future  
Sees derm annually.      
right upper arm
left upper arm

## 2025-04-30 NOTE — PATIENT INSTRUCTIONS
1. Hypercholesterolemia  Assessment & Plan:  Patient is on Lipitor 20 keeping LDL at goal at 70 continue recheck 6 months.       2. Generalized anxiety disorder  Assessment & Plan:  Has Xanax as needed refills needed today.  PDMP checked verified no red flags.       Orders:  -     ALPRAZolam (XANAX) 0.25 mg tablet; Take 1 tablet (0.25 mg total) by mouth daily as needed for anxiety  3. Malignant melanoma of skin (HCC)  Assessment & Plan:         4. Age-related osteoporosis without current pathological fracture  Assessment & Plan:  Patient declining further DEXA's as does not wish to treat this diagnosis with medication.       5. Paroxysmal atrial fibrillation (HCC)  Assessment & Plan:  Continue lifelong Xarelto 20 mg daily continue follow-up with cardiology.       6. Essential hypertension  Assessment & Plan:  Blood pressure very stable at 112/64.

## 2025-04-30 NOTE — PROGRESS NOTES
Name: Zenia Youssef      : 1945      MRN: [id number]  Encounter Provider: Didi Talley PA-C  Encounter Date: 2025   Encounter department: [facility] PRIMARY [facility]ARE  :  Assessment & Plan  Generalized anxiety disorder  Has Xanax as needed refills needed today.  PDMP checked verified no red flags.  Orders:  •  ALPRAZolam (XANAX) 0.25 mg tablet; Take 1 tablet (0.25 mg total) by mouth daily as needed for anxiety    Malignant melanoma of skin (HCC)         Hypercholesterolemia  Patient is on Lipitor 20 keeping LDL at goal at 70 continue recheck 6 months.  Orders:  •  Lipid Panel with Direct LDL reflex; Future    Age-related osteoporosis without current pathological fracture  Patient declining further DEXA's as does not wish to treat this diagnosis with medication.  Orders:  •  Vitamin D 25 hydroxy; Future    Paroxysmal atrial fibrillation (HCC)  Continue lifelong Xarelto 20 mg daily continue follow-up with cardiology.       Essential hypertension  Blood pressure very stable at 112/64.  Orders:  •  Comprehensive metabolic panel; Future    Squamous cell carcinoma of skin  Sees derm annually.          Depression Screening and Follow-up Plan: Patient was screened for depression during today's encounter. They screened negative with a PHQ-2 score of 0.        Preventive health issues were discussed with patient, and age appropriate screening tests were ordered as noted in patient's After Visit Summary. Personalized health advice and appropriate referrals for health education or preventive services given if needed, as noted in patient's After Visit Summary.    History of Present Illness     Patient presents with:  Follow-up: Pt present for her 6 month follow up.  Medicare Wellness Visit: Pt due      Zenia Youssef is here for chronic conditions f/u. Pt. had labs done prior to today's visit which included Recent Results (from the past 4 weeks)  -Comprehensive metabolic panel:   Collection Time:  04/17/25  8:38 AM       Result                      Value             Ref Range           Sodium                      141               135 - 147 mm*       Potassium                   4.0               3.5 - 5.3 mm*       Chloride                    103               96 - 108 mmo*       CO2                         27                21 - 32 mmol*       ANION GAP                   11                4 - 13 mmol/L       BUN                         31 (H)            5 - 25 mg/dL        Creatinine                  0.82              0.60 - 1.30 *       Glucose, Fasting            82                65 - 99 mg/dL       Calcium                     9.4               8.4 - 10.2 m*       AST                         24                13 - 39 U/L         ALT                         33                7 - 52 U/L          Alkaline Phosphatase        81                34 - 104 U/L        Total Protein               7.1               6.4 - 8.4 g/*       Albumin                     4.2               3.5 - 5.0 g/*       Total Bilirubin             0.58              0.20 - 1.00 *       eGFR                        68                ml/min/1.73s*  -Lipid Panel with Direct LDL reflex:   Collection Time: 04/17/25  8:38 AM       Result                      Value             Ref Range           Cholesterol                 128               See Comment *       Triglycerides               72                See Comment *       HDL, Direct                 44 (L)            >=50 mg/dL          LDL Calculated              70                0 - 100 mg/dL  -CBC and differential:   Collection Time: 04/28/25  1:45 PM       Result                      Value             Ref Range           WBC                         6.99              4.31 - 10.16*       RBC                         4.59              3.81 - 5.12 *       Hemoglobin                  13.8              11.5 - 15.4 *       Hematocrit                  42.0              34.8 - 46.1 %       MCV                          92                82 - 98 fL          MCH                         30.1              26.8 - 34.3 *       MCHC                        32.9              31.4 - 37.4 *       RDW                         14.0              11.6 - 15.1 %       MPV                         10.2              8.9 - 12.7 fL       Platelets                   258               149 - 390 Th*       nRBC                        0                 /100 WBCs           Segmented %                 58                43 - 75 %           Immature Grans %            1                 0 - 2 %             Lymphocytes %               32                14 - 44 %           Monocytes %                 7                 4 - 12 %            Eosinophils Relative        1                 0 - 6 %             Basophils Relative          1                 0 - 1 %             Absolute Neutrophils        4.15              1.85 - 7.62 *       Absolute Immature Grans     0.04              0.00 - 0.20 *       Absolute Lymphocytes        2.24              0.60 - 4.47 *       Absolute Monocytes          0.47              0.17 - 1.22 *       Eosinophils Absolute        0.05              0.00 - 0.61 *       Basophils Absolute          0.04              0.00 - 0.10 *         Patient Care Team:  Didi Talley PA-C as PCP - General (Family Medicine)    Review of Systems   Constitutional: Negative.    HENT: Negative.     Eyes: Negative.    Respiratory: Negative.     Cardiovascular: Negative.    Gastrointestinal: Negative.    Endocrine: Negative.    Genitourinary: Negative.    Musculoskeletal: Negative.    Skin: Negative.    Allergic/Immunologic: Negative.    Neurological: Negative.    Hematological: Negative.    Psychiatric/Behavioral: Negative.       Medical History Reviewed by provider this encounter:       Annual Wellness Visit Questionnaire   Zenia is here for her Subsequent Wellness visit.     Health Risk Assessment:   Patient rates overall health as  good. Patient feels that their physical health rating is same. Patient is very satisfied with their life. Eyesight was rated as same. Hearing was rated as same. Patient feels that their emotional and mental health rating is same. Patients states they are never, rarely angry. Patient states they are never, rarely unusually tired/fatigued. Pain experienced in the last 7 days has been some. Patient's pain rating has been 2/10. Patient states that she has experienced no weight loss or gain in last 6 months. Abdominal discomfort    Depression Screening:   PHQ-2 Score: 0      Fall Risk Screening:   In the past year, patient has experienced: no history of falling in past year      Urinary Incontinence Screening:   Patient has not leaked urine accidently in the last six months.     Home Safety:  Patient does not have trouble with stairs inside or outside of their home. Patient has working smoke alarms and has no working carbon monoxide detector. Home safety hazards include: none.     Nutrition:   Current diet is Regular and Low Cholesterol.     Medications:   Patient is currently taking over-the-counter supplements. OTC medications include: see medication list. Patient is able to manage medications.     Activities of Daily Living (ADLs)/Instrumental Activities of Daily Living (IADLs):   Walk and transfer into and out of bed and chair?: Yes  Dress and groom yourself?: Yes    Bathe or shower yourself?: Yes    Feed yourself? Yes  Do your laundry/housekeeping?: Yes  Manage your money, pay your bills and track your expenses?: Yes  Make your own meals?: Yes    Do your own shopping?: Yes    Previous Hospitalizations:   Any hospitalizations or ED visits within the last 12 months?: No      Advance Care Planning:   Living will: Yes    Advanced directive: Yes      Cognitive Screening:   Provider or family/friend/caregiver concerned regarding cognition?: No    Preventive Screenings      Cardiovascular Screening:    General: Screening  Not Indicated, History Lipid Disorder and Screening Current      Diabetes Screening:     General: Screening Current      Colorectal Cancer Screening:     General: Screening Not Indicated      Breast Cancer Screening:     General: Screening Current      Cervical Cancer Screening:    General: Screening Not Indicated      Osteoporosis Screening:    General: History Osteoporosis and Patient Declines      Abdominal Aortic Aneurysm (AAA) Screening:        General: Screening Not Indicated      Lung Cancer Screening:     General: Screening Not Indicated      Hepatitis C Screening:    General: Screening Current    Immunizations:  - Immunizations due: Up to date with all vaccines.    Screening, Brief Intervention, and Referral to Treatment (SBIRT)     Screening    Typical number of drinks in a week: 0    Single Item Drug Screening:  How often have you used an illegal drug (including marijuana) or a prescription medication for non-medical reasons in the past year? never    Single Item Drug Screen Score: 0  Interpretation: Negative screen for possible drug use disorder    Social Drivers of Health     Financial Resource Strain: Low Risk  (4/10/2023)    Overall Financial Resource Strain (CARDIA)    • Difficulty of Paying Living Expenses: Not hard at all   Food Insecurity: No Food Insecurity (4/30/2025)    Hunger Vital Sign    • Worried About Running Out of Food in the Last Year: Never true    • Ran Out of Food in the Last Year: Never true   Transportation Needs: No Transportation Needs (4/30/2025)    PRAPARE - Transportation    • Lack of Transportation (Medical): No    • Lack of Transportation (Non-Medical): No   Housing Stability: Low Risk  (4/30/2025)    Housing Stability Vital Sign    • Unable to Pay for Housing in the Last Year: No    • Number of Times Moved in the Last Year: 0    • Homeless in the Last Year: No   Utilities: Not At Risk (4/30/2025)    [facility] Utilities    • Threatened with loss of utilities: No     No results  "found.    Objective   /64 (BP Location: Right arm, Patient Position: Sitting, Cuff Size: Standard)   Pulse 85   Ht 5' 3\" (1.6 m)   Wt 57.2 kg (126 lb)   SpO2 97%   BMI 22.32 kg/m²     Physical Exam  Vitals and nursing note reviewed.   Constitutional:       Appearance: Normal appearance. She is well-developed.   HENT:      Head: Normocephalic and atraumatic.   Eyes:      General: Lids are normal.      Conjunctiva/sclera: Conjunctivae normal.      Pupils: Pupils are equal, round, and reactive to light.   Cardiovascular:      Rate and Rhythm: Normal rate and regular rhythm.      Heart sounds: No murmur heard.  Pulmonary:      Effort: Pulmonary effort is normal.      Breath sounds: Normal breath sounds.   Skin:     General: Skin is warm and dry.   Neurological:      General: No focal deficit present.      Mental Status: She is alert.      Coordination: Coordination is intact.   Psychiatric:         Mood and Affect: Mood normal.         Behavior: Behavior normal. Behavior is cooperative.         Thought Content: Thought content normal.         Judgment: Judgment normal.         "

## 2025-05-12 DIAGNOSIS — I48.0 PAF (PAROXYSMAL ATRIAL FIBRILLATION) (HCC): ICD-10-CM

## 2025-05-12 RX ORDER — BISOPROLOL FUMARATE 5 MG/1
TABLET, FILM COATED ORAL
Qty: 15 TABLET | Refills: 0 | OUTPATIENT
Start: 2025-05-12

## 2025-06-21 DIAGNOSIS — I48.91 NEW ONSET ATRIAL FIBRILLATION (HCC): ICD-10-CM

## 2025-06-21 DIAGNOSIS — I48.91 ATRIAL FIBRILLATION WITH RAPID VENTRICULAR RESPONSE (HCC): ICD-10-CM

## 2025-06-22 RX ORDER — RIVAROXABAN 20 MG/1
20 TABLET, FILM COATED ORAL
Qty: 30 TABLET | Refills: 5 | Status: SHIPPED | OUTPATIENT
Start: 2025-06-22

## 2025-06-22 RX ORDER — DILTIAZEM HYDROCHLORIDE 240 MG/1
240 CAPSULE, COATED, EXTENDED RELEASE ORAL DAILY
Qty: 90 CAPSULE | Refills: 1 | Status: SHIPPED | OUTPATIENT
Start: 2025-06-22

## 2025-06-28 NOTE — ASSESSMENT & PLAN NOTE
04/17/2025: Total cholesterol 128, triglycerides 72, HDL 44, LDL calculated 70  Continue atorvastatin 20 mg daily

## 2025-06-28 NOTE — ASSESSMENT & PLAN NOTE
Case reviewed with Dr Davidson who feels that the valve may need to be replaced. I do not feel patient is symptomatic enough to warrant replacement    Not on ARB due to abdominal pain which Dr. Miller felt was from the losartan     7/12/2023 echocardiogram: Normal left ventricular systolic function, EF 60% atrial fibrillation with diastolic dysfunction.  Sigmoid shaped septum.  Severe LA enlargement.  Mild RA dilated.  Aortic sclerosis.  Valve posterior leaflet prolapse with moderate regurgitation.  Mild to moderate tricuspid regurgitation.  PASP 59 mmHg.    1/22/2024 DEANGELO: LVEF 60%.  RV normal.  LA and RA dilated.  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      Tried placing patient on carvedilol 3.125 mg 2 times a day and she called stating it is making dizzy and she is very SOB, worse than without the medication. She states she cannot drive taking this.  Coreg was discontinued.    GDMT:  Beta-blocker: Bisoprolol 2.5 mg daily (one half 5 mg tablet)  ACE/ARB/ARNI: Was on losartan and GI felt it was the cause of her abdominal pain.  Losartan discontinued and the pain resolved.  (Allergy to ARB's)  SGLT 2 agonist: Discussed with her starting Jardiance but she is on a fixed income and finding the co-pay for a month of Eliquis difficult.  I have not pushed her to do this since she is predominantly asymptomatic  Mineralocorticoid antagonist have not tried since patient runs a low blood pressure and has never actually been in congestive heart failure

## 2025-06-30 ENCOUNTER — OFFICE VISIT (OUTPATIENT)
Dept: CARDIOLOGY CLINIC | Facility: CLINIC | Age: 80
End: 2025-06-30
Payer: MEDICARE

## 2025-06-30 VITALS
SYSTOLIC BLOOD PRESSURE: 140 MMHG | BODY MASS INDEX: 22.28 KG/M2 | WEIGHT: 125.8 LBS | DIASTOLIC BLOOD PRESSURE: 70 MMHG | HEART RATE: 86 BPM

## 2025-06-30 DIAGNOSIS — I34.0 NONRHEUMATIC MITRAL VALVE REGURGITATION: ICD-10-CM

## 2025-06-30 DIAGNOSIS — I48.0 PAROXYSMAL ATRIAL FIBRILLATION (HCC): Primary | ICD-10-CM

## 2025-06-30 DIAGNOSIS — E78.00 HYPERCHOLESTEROLEMIA: ICD-10-CM

## 2025-06-30 DIAGNOSIS — I25.10 NONOBSTRUCTIVE ATHEROSCLEROSIS OF CORONARY ARTERY: ICD-10-CM

## 2025-06-30 DIAGNOSIS — I35.8 AORTIC VALVE SCLEROSIS: ICD-10-CM

## 2025-06-30 PROCEDURE — 93000 ELECTROCARDIOGRAM COMPLETE: CPT | Performed by: INTERNAL MEDICINE

## 2025-06-30 PROCEDURE — 99214 OFFICE O/P EST MOD 30 MIN: CPT | Performed by: INTERNAL MEDICINE

## 2025-06-30 NOTE — PROGRESS NOTES
[facility]  [address] [place] [address]  Phone#  [phone]   Fax#  [phone]  *-*-*-*-*-*-*-*-*-*-*-*-*-*-*-*-*-*-*-*-*-*-*-*-*-*-*-*-*-*-*-*-*-*-*-*-*-*-*-*-*-*-*-*-*-*-*-*-*-*-*-*-*-*                                   Cardiology Follow Up      ENCOUNTER DATE: 25 3:36 PM  PATIENT NAME: Zenia Youssef   : 1945    MRN: [id number]  AGE:80 y.o.      SEX: female  ENCOUNTER PROVIDER:Cosmo King MD     PRIMARY CARE PHYSICIAN: Didi Talley PA-C    ACTIVE DIAGNOSIS AND PLAN    1. Paroxysmal atrial fibrillation (HCC)  Assessment & Plan:  Atrial fibrillation first noted 2023 when hospitalized with atrial fibrillation with RVR     Patient has occasional palpitations.  Sometimes they will last as long as 3 days.  She has no dizziness or lightheadedness with these palpitations and is able to go about her normal activities.    Consider Zio patch monitor in the future.    Xarelto 20 mg daily  Diltiazem  mg daily  Bisoprolol 5 mg daily  Orders:  -     POCT ECG  2. Nonobstructive atherosclerosis of coronary artery  Assessment & Plan:  2022 cardiac catheterization: Ostial circumflex lesion 50% stenosis.     Asymptomatic.  3. Mitral valve prolapse, torn chord, with moderate to severe regurgitation  Assessment & Plan:  Case reviewed with Dr Davidson who feels that the valve may need to be replaced. I do not feel patient is symptomatic enough to warrant replacement    Not on ARB due to abdominal pain which Dr. Miller felt was from the losartan     2023 echocardiogram: Normal left ventricular systolic function, EF 60% atrial fibrillation with diastolic dysfunction.  Sigmoid shaped septum.  Severe LA enlargement.  Mild RA dilated.  Aortic sclerosis.  Valve posterior leaflet prolapse with moderate regurgitation.  Mild to moderate tricuspid regurgitation.  PASP 59 mmHg.    2024 DEANGELO: LVEF 60%.  RV normal.  LA and RA dilated.  P2 and P3 of the  posterior leaflet are flail from ruptured cord, PASP 54 mmHg.      Tried placing patient on carvedilol 3.125 mg 2 times a day and she called stating it is making dizzy and she is very SOB, worse than without the medication. She states she cannot drive taking this.  Coreg was discontinued.    GDMT:  Beta-blocker: Bisoprolol 2.5 mg daily (one half 5 mg tablet)  ACE/ARB/ARNI: Was on losartan and GI felt it was the cause of her abdominal pain.  Losartan discontinued and the pain resolved.  (Allergy to ARB's)  SGLT 2 agonist: Discussed with her starting Jardiance but she is on a fixed income and finding the co-pay for a month of Eliquis difficult.  I have not pushed her to do this since she is predominantly asymptomatic  Mineralocorticoid antagonist have not tried since patient runs a low blood pressure and has never actually been in congestive heart failure  4. Hypercholesterolemia  Assessment & Plan:  04/17/2025: Total cholesterol 128, triglycerides 72, HDL 44, LDL calculated 70  Continue atorvastatin 20 mg daily  5. Aortic valve sclerosis  Assessment & Plan:  Aortic valve murmur noted on exam and aortic sclerosis noted on echo     INTERVAL HISTORY:        Patient has severe mitral regurgitation with 2 torn cords but is only mildly symptomatic.  I had asked Dr. Davidson to evaluate the patient and he felt that her age he was not symptomatic enough to undergo mitral valve repair/replacement and the patient most likely was going to need replacement.  She has chronic atrial fibrillation with a severely enlarged left atrium.    She is on bisoprolol 2.5 mg daily and diltiazem  milligrams daily for rate control.  Patient states that after taking bisoprolol, she feels very tired and usually has to take a nap.  Suggested that she take her bisoprolol at bedtime.  She should take her Cardizem in the morning.      She had 1 day since her last visit during which she felt very beat and did not feel like doing anything all  day.  The following day she was back to normal.  She does not remember whether she had overdone it the day before or anything unusual about the day before.      She goes out dancing with her  and every year she finds she can dance a little less before she develops short of breath.  However she is able to do all her housework without difficulty.  There are times she has palpitations with physical exertion but it is not been a major problem.    I was going to prescribe Jardiance during during this visit.  But I decided that since she is feeling well, I would hold off on the Jardiance.  Jardiance could be a possibility in the future.  If she cannot afford it, Farxiga 10 mg from [facility] might be a possibility.  We need to fax them a signed prescription and then she needs to call in a day or 2 and make financial arrangements with them.  The price will be about $66 for 100 tablets, 1 daily    DISCUSSION/PLAN:          Continue atorvastatin 20 mg daily  Continue bisoprolol 5 mg, 1/2 tablet that daily, take at bedtime  Diltiazem  milligrams daily, take in the morning   Xarelto 20 mg daily  Return in 6 months    CARDIOLOGY HISTORY AND TESTING  Patient was 1st seen on 05/27/2021. Patient is a is a 76-year-old female who developed symptoms of chest tightness radiating into her neck and jaw with shortness of breath on physical exertion such as going up a flight of stairs.  The discomfort would melba with rest.  Her primary care physician ordered a stress echocardiogram which was nondiagnostic and equivocal for myocardial ischemia.  Mild mitral regurgitation was noted on the echocardiogram.  Patient states that since the stress test, her exertional chest tightness and shortness of breath has been much less severe.  Patient has no family history of coronary artery disease.  A sister has a murmur.  She is a lifetime nonsmoker.  Patient had a melanoma removed 11 years ago and has had no evidence of  recurrence.    06/23/2021 nuclear stress test: Resting hypertension with exaggerated hypertensive response to exercise negative treadmill stress test for angina pectoris but with abnormal ST depression with exercise. Exercise induced ventricular ectopy. Normal LV systolic function, EF 77%. Normal tomographic perfusion series.    06/01/2021 echocardiogram:  Normal LV function EF 60%, mild concentric LVH.  Grade 1 diastolic dysfunction.  Mild-to-moderate left atrial enlargement.  Moderate mitral regurgitation.    02/24/2022 cardiac catheterization: Ostial circumflex lesion 50% stenosis.    7/12-7/14/2023 [facility] new onset atrial fibrillation with RVR    7/12/2023 echocardiogram: Normal left ventricular systolic function, EF 60% atrial fibrillation with diastolic dysfunction.  Sigmoid shaped septum.  Severe LA enlargement.  Mild RA dilated.  Aortic sclerosis.  Valve posterior leaflet prolapse with moderate regurgitation.  Mild to moderate tricuspid regurgitation.  PASP 59 mmHg.Z    1/22/2024 [facility]ANGELO: LVEF 60%.  RV normal.  LA and RA dilated.  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      ACTIVE PROBLEM LIST  Problem List[1]    TODAY'S EKG  Results for orders placed or performed in visit on 06/30/25   POCT ECG    Narrative    Atrial fibrillation at a ventricular rate of 86 bpm.  Abnormal ECG.         Lab Studies:    Lab Results   Component Value Date    CHOLESTEROL 128 04/17/2025    CHOLESTEROL 141 10/08/2024    CHOLESTEROL 140 04/11/2024     Lab Results   Component Value Date    TRIG 72 04/17/2025    TRIG 74 10/08/2024    TRIG 72 04/11/2024     Lab Results   Component Value Date    HDL 44 (L) 04/17/2025    HDL 48 (L) 10/08/2024    HDL 47 (L) 04/11/2024     Lab Results   Component Value Date    LDLCALC 70 04/17/2025    LDLCALC 78 10/08/2024    LDLCALC 79 04/11/2024     Lab Results   Component Value Date    LDLDIRECT 112 05/07/2014     Lab Results   Component Value Date    EGFR 68  04/17/2025    EGFR 77 10/08/2024    EGFR 81 04/11/2024    SODIUM 141 04/17/2025    SODIUM 140 10/08/2024    SODIUM 142 04/11/2024    K 4.0 04/17/2025    K 4.1 10/08/2024    K 4.1 04/11/2024     04/17/2025     10/08/2024     04/11/2024    CO2 27 04/17/2025    CO2 29 10/08/2024    CO2 28 04/11/2024    ANIONGAP 5 05/07/2014    BUN 31 (H) 04/17/2025    BUN 24 10/08/2024    BUN 26 (H) 04/11/2024    CREATININE 0.82 04/17/2025    CREATININE 0.75 10/08/2024    CREATININE 0.72 04/11/2024     Lab Results   Component Value Date    WBC 6.99 04/28/2025    WBC 5.29 10/08/2024    WBC 7.16 10/03/2023    HGB 13.8 04/28/2025    HGB 13.2 10/08/2024    HGB 14.0 10/03/2023    HCT 42.0 04/28/2025    HCT 40.9 10/08/2024    HCT 42.3 10/03/2023    MCV 92 04/28/2025    MCV 93 10/08/2024    MCV 91 10/03/2023    MCH 30.1 04/28/2025    MCH 29.9 10/08/2024    MCH 30.2 10/03/2023    MCHC 32.9 04/28/2025    MCHC 32.3 10/08/2024    MCHC 33.1 10/03/2023     04/28/2025     10/08/2024     10/03/2023      Lab Results   Component Value Date    GLUCOSE 98 05/07/2014    CALCIUM 9.4 04/17/2025    CALCIUM 9.2 10/08/2024    CALCIUM 9.3 04/11/2024    ALB 4.2 04/17/2025    ALB 3.8 10/08/2024    ALB 4.1 04/11/2024    TP 7.1 04/17/2025    TP 6.8 10/08/2024    TP 7.4 04/11/2024    AST 24 04/17/2025    AST 21 10/08/2024    AST 23 04/11/2024    ALT 33 04/17/2025    ALT 27 10/08/2024    ALT 26 04/11/2024    ALKPHOS 81 04/17/2025    ALKPHOS 78 10/08/2024    ALKPHOS 80 04/11/2024    BILITOT 0.37 05/07/2014     Lab Results   Component Value Date     (H) 07/12/2023       Current Medications[2]  Allergies   Allergen Reactions    Losartan Abdominal Pain     Recent demonstrates that this side effect will be experienced by the entire class of drugs.    Macrobid [Nitrofurantoin] GI Intolerance    Penicillins Other (See Comments)     Childhood. Per pt, unsure of reaction    Protonix [Pantoprazole] Nausea Only    Wound Dressing  Adhesive Other (See Comments)     bandaid if on for more than 24 hours       Past Medical History[3]  Social History     Socioeconomic History    Marital status: /Civil Union     Spouse name: Not on file    Number of children: Not on file    Years of education: Not on file    Highest education level: Not on file   Occupational History    Occupation: retired - [employer]   Tobacco Use    Smoking status: Never    Smokeless tobacco: Never    Tobacco comments:     No secondhand smoke exposure    Vaping Use    Vaping status: Never Used   Substance and Sexual Activity    Alcohol use: Not Currently     Comment: very rare socially    Drug use: Never    Sexual activity: Not Currently     Partners: Male   Other Topics Concern    Not on file   Social History Narrative    Not on file     Social Drivers of Health     Financial Resource Strain: Low Risk  (4/10/2023)    Overall Financial Resource Strain (CARDIA)     Difficulty of Paying Living Expenses: Not hard at all   Food Insecurity: No Food Insecurity (4/30/2025)    Nursing - Inadequate Food Risk Classification     Worried About Running Out of Food in the Last Year: Never true     Ran Out of Food in the Last Year: Never true     Ran Out of Food in the Last Year: Not on file   Transportation Needs: No Transportation Needs (4/30/2025)    PRAPARE - Transportation     Lack of Transportation (Medical): No     Lack of Transportation (Non-Medical): No   Physical Activity: Not on file   Stress: Not on file   Social Connections: Not on file   Intimate Partner Violence: Not on file   Housing Stability: Low Risk  (4/30/2025)    Housing Stability Vital Sign     Unable to Pay for Housing in the Last Year: No     Number of Times Moved in the Last Year: 0     Homeless in the Last Year: No      Family History[4]  Past Surgical History[5]    PREVIOUS WEIGHTS:   Wt Readings from Last 10 Encounters:   06/30/25 57.1 kg (125 lb 12.8 oz)   04/30/25 57.2 kg (126 lb)    04/28/25 57.3 kg (126 lb 6.4 oz)   01/27/25 56.6 kg (124 lb 12.8 oz)   10/23/24 56.8 kg (125 lb 3.2 oz)   09/20/24 57.2 kg (126 lb)   08/23/24 58 kg (127 lb 12.8 oz)   05/06/24 57 kg (125 lb 9.6 oz)   04/29/24 57 kg (125 lb 9.6 oz)   04/24/24 56.7 kg (125 lb)        Review of Systems:  Review of Systems   Respiratory:  Negative for cough, choking, chest tightness, shortness of breath and wheezing.    Cardiovascular:  Negative for chest pain, palpitations and leg swelling.   Skin:  Negative for rash.   Neurological:  Negative for dizziness, tremors, syncope, weakness, light-headedness, numbness and headaches.   Psychiatric/Behavioral:  Negative for agitation and behavioral problems. The patient is not hyperactive.        Physical Exam:  /70 (BP Location: Right arm, Patient Position: Sitting, Cuff Size: Adult)   Pulse 86   Wt 57.1 kg (125 lb 12.8 oz)   BMI 22.28 kg/m²     Physical Exam  Constitutional:       General: She is not in acute distress.     Appearance: She is well-developed.   HENT:      Head: Normocephalic and atraumatic.   Neck:      Thyroid: No thyromegaly.      Vascular: No carotid bruit or JVD.      Trachea: No tracheal deviation.     Cardiovascular:      Rate and Rhythm: Normal rate and regular rhythm.      Pulses: Normal pulses.      Heart sounds: Normal heart sounds. No murmur heard.     No friction rub. No gallop.   Pulmonary:      Effort: Pulmonary effort is normal. No respiratory distress.      Breath sounds: Normal breath sounds. No wheezing, rhonchi or rales.   Chest:      Chest wall: No tenderness.     Musculoskeletal:         General: Normal range of motion.      Cervical back: Normal range of motion and neck supple.      Right lower leg: No edema.      Left lower leg: No edema.     Skin:     General: Skin is warm and dry.     Neurological:      General: No focal deficit present.      Mental Status: She is alert and oriented to person, place, and time.     Psychiatric:         Mood  "and Affect: Mood normal.         Behavior: Behavior normal.         Thought Content: Thought content normal.         Judgment: Judgment normal.       ======================================================  Imaging:   Results Review Statement: No pertinent imaging studies reviewed.    Portions of the record may have been created with voice recognition software. Occasional wrong word or \"sound a like\" substitutions may have occurred due to the inherent limitations of voice recognition software. Read the chart carefully and recognize, using context, where substitutions have occurred.    SIGNATURES:   Cosmo King MD          [1]   Patient Active Problem List  Diagnosis    Generalized anxiety disorder    Diverticulosis    GERD without esophagitis    Hiatal hernia    Hypercholesterolemia    Essential hypertension    Irritable bowel syndrome    Malignant melanoma of skin (HCC)    Age-related osteoporosis without current pathological fracture    Squamous cell carcinoma of skin    Splenic artery aneurysm (HCC)    Other specified disorders of rotator cuff syndrome of shoulder and allied disorders    Mitral valve prolapse, torn chord, with moderate to severe regurgitation    Nonobstructive atherosclerosis of coronary artery    Paroxysmal atrial fibrillation (HCC)    Aortic valve sclerosis    Allergic rhinitis    Vertigo   [2]   Current Outpatient Medications:     ALPRAZolam (XANAX) 0.25 mg tablet, Take 1 tablet (0.25 mg total) by mouth daily as needed for anxiety, Disp: 30 tablet, Rfl: 0    atorvastatin (LIPITOR) 20 mg tablet, Take 1 tablet (20 mg total) by mouth daily, Disp: 90 tablet, Rfl: 1    BIOTIN PO, Take by mouth, Disp: , Rfl:     bisoprolol (ZEBETA) 5 mg tablet, TAKE HALF A TABLET BY MOUTH DAILY, Disp: 15 tablet, Rfl: 5    calcium carbonate (OS-BRYCE) 600 MG tablet, Take 600 mg by mouth in the morning and 600 mg in the evening. Take with meals., Disp: , Rfl:     Cholecalciferol (VITAMIN D3) 1000 units CAPS, Take 2 " capsules by mouth in the morning., Disp: , Rfl:     diltiazem (CARDIZEM CD) 240 mg 24 hr capsule, TAKE ONE CAPSULE BY MOUTH EVERY DAY, Disp: 90 capsule, Rfl: 1    diphenoxylate-atropine (LOMOTIL) 2.5-0.025 mg per tablet, Take 1 tablet by mouth 4 (four) times a day as needed for diarrhea, Disp: 12 tablet, Rfl: 0    hydrocortisone (ANUSOL-HC) 2.5 % rectal cream, Apply topically 2 (two) times a day, Disp: 28 g, Rfl: 1    melatonin 1 mg, Take 1 mg by mouth daily at bedtime as needed, Disp: , Rfl:     Vitamin Mixture ([place]-C) 500-60 MG TABS, Take by mouth, Disp: , Rfl:     Xarelto 20 MG tablet, TAKE 1 TABLET BY MOUTH DAILY WITH BREAKFAST, Disp: 30 tablet, Rfl: 5  [3]   Past Medical History:  Diagnosis Date    Anxiety     Diverticulosis     GERD (gastroesophageal reflux disease)     Heart murmur last year    Hiatal hernia     Hypertension     Melanoma (HCC)     Osteoporosis    [4]   Family History  Problem Relation Name Age of Onset    Uterine cancer Mother ?     Cancer Mother ?     Prostate cancer Father daddy     Cancer Father daddy     Osteoporosis Sister     [5]   Past Surgical History:  Procedure Laterality Date    CARDIAC CATHETERIZATION N/A 02/24/2022    Procedure: Cardiac catheterization;  Surgeon: Harinder Calvo MD;  Location: [facility];  Service: Cardiology    CARDIAC CATHETERIZATION N/A 02/24/2022    Procedure: Cardiac Coronary Angiogram;  Surgeon: Harinder Calvo MD;  Location: [facility];  Service: Cardiology    [facility] du[facility]; normal stomach; non-severe esophagitis. Bx: Neg. H. pylori, ulceration and dysplasia.    EGD AND COLONOSCOPY  05/14/2014    NORMAL/HP    HYSTERECTOMY  ?

## 2025-07-05 DIAGNOSIS — I25.10 NONOBSTRUCTIVE ATHEROSCLEROSIS OF CORONARY ARTERY: ICD-10-CM

## 2025-07-05 DIAGNOSIS — I20.9 ANGINA PECTORIS (HCC): ICD-10-CM

## 2025-07-08 RX ORDER — ATORVASTATIN CALCIUM 20 MG/1
20 TABLET, FILM COATED ORAL DAILY
Qty: 90 TABLET | Refills: 0 | Status: SHIPPED | OUTPATIENT
Start: 2025-07-08

## (undated) DEVICE — RADIFOCUS OPTITORQUE ANGIOGRAPHIC CATHETER: Brand: OPTITORQUE

## (undated) DEVICE — GUIDEWIRE WHOLEY HI TORQUE INTERM MOD J .035 145CM

## (undated) DEVICE — GLIDESHEATH SLENDER STAINLESS STEEL KIT: Brand: GLIDESHEATH SLENDER

## (undated) DEVICE — CATH GUIDE LAUNCHER 5FR EBU 3.5

## (undated) DEVICE — CATH DIAG 5FR IMPULSE 110CM 6S PIG 145 ANG

## (undated) DEVICE — DGW .035 FC J3MM 260CM TEF: Brand: EMERALD

## (undated) DEVICE — TR BAND RADIAL ARTERY COMPRESSION DEVICE: Brand: TR BAND

## (undated) DEVICE — GUIDEWIRE FFR VERRATA PLUS 185CM STR